# Patient Record
Sex: FEMALE | Race: WHITE | NOT HISPANIC OR LATINO | Employment: OTHER | URBAN - METROPOLITAN AREA
[De-identification: names, ages, dates, MRNs, and addresses within clinical notes are randomized per-mention and may not be internally consistent; named-entity substitution may affect disease eponyms.]

---

## 2017-01-12 ENCOUNTER — HOSPITAL ENCOUNTER (OUTPATIENT)
Dept: PAIN MEDICINE | Facility: HOSPITAL | Age: 62
Discharge: HOME | End: 2017-01-12
Attending: ANESTHESIOLOGY | Admitting: ANESTHESIOLOGY

## 2017-02-02 ENCOUNTER — HOSPITAL ENCOUNTER (OUTPATIENT)
Dept: ORTHOPEDIC SURGERY | Facility: CLINIC | Age: 62
Discharge: HOME | End: 2017-02-02
Attending: ORTHOPAEDIC SURGERY | Admitting: ORTHOPAEDIC SURGERY

## 2017-02-08 ENCOUNTER — HOSPITAL ENCOUNTER (OUTPATIENT)
Dept: MRI IMAGING | Facility: HOSPITAL | Age: 62
Discharge: HOME | End: 2017-02-08
Attending: ORTHOPAEDIC SURGERY | Admitting: ORTHOPAEDIC SURGERY

## 2017-02-21 ENCOUNTER — HOSPITAL ENCOUNTER (OUTPATIENT)
Dept: OTHER | Facility: HOSPITAL | Age: 62
Discharge: HOME | End: 2017-02-21
Attending: ORTHOPAEDIC SURGERY | Admitting: ORTHOPAEDIC SURGERY

## 2017-02-21 ENCOUNTER — OFFICE (AMBULATORY)
Dept: URBAN - METROPOLITAN AREA CLINIC 64 | Facility: CLINIC | Age: 62
End: 2017-02-21

## 2017-02-21 VITALS
HEIGHT: 63 IN | HEART RATE: 102 BPM | WEIGHT: 187 LBS | DIASTOLIC BLOOD PRESSURE: 63 MMHG | SYSTOLIC BLOOD PRESSURE: 91 MMHG

## 2017-02-21 DIAGNOSIS — K59.00 CONSTIPATION, UNSPECIFIED: ICD-10-CM

## 2017-02-21 DIAGNOSIS — K21.9 GASTRO-ESOPHAGEAL REFLUX DISEASE WITHOUT ESOPHAGITIS: ICD-10-CM

## 2017-02-21 LAB
ANION GAP SERPL CALC-SCNC: 9.8 MMOL/L (ref 10–20)
BACTERIA SPEC AEROBE CULT: NORMAL
BASOPHILS # BLD AUTO: 0.1 10*3/UL (ref 0–0.2)
BASOPHILS NFR BLD AUTO: 1 % (ref 0–2)
BUN SERPL-MCNC: 15 MG/DL (ref 8–20)
BUN/CREAT SERPL: 18.8 (ref 5.4–26.2)
CALCIUM SERPL-MCNC: 9.5 MG/DL (ref 8.9–10.3)
CHLORIDE SERPL-SCNC: 104 MMOL/L (ref 101–111)
CONV CO2: 31 MMOL/L (ref 22–32)
CREAT UR-MCNC: 0.8 MG/DL (ref 0.4–1)
DIFFERENTIAL METHOD BLD: (no result)
EOSINOPHIL # BLD AUTO: 0.1 10*3/UL (ref 0–0.3)
EOSINOPHIL # BLD AUTO: 2 % (ref 0–3)
ERYTHROCYTE [DISTWIDTH] IN BLOOD BY AUTOMATED COUNT: 13.8 % (ref 11.5–14.5)
GLUCOSE SERPL-MCNC: 79 MG/DL (ref 65–99)
HCT VFR BLD AUTO: 36.9 % (ref 35–49)
HGB BLD-MCNC: 12.6 G/DL (ref 12–15)
LYMPHOCYTES # BLD AUTO: 2 10*3/UL (ref 0.8–4.8)
LYMPHOCYTES NFR BLD AUTO: 37 % (ref 18–42)
Lab: NORMAL
MCH RBC QN AUTO: 31 PG (ref 26–32)
MCHC RBC AUTO-ENTMCNC: 34.2 G/DL (ref 32–36)
MCV RBC AUTO: 90.5 FL (ref 80–94)
MICRO REPORT STATUS: NORMAL
MONOCYTES # BLD AUTO: 0.4 10*3/UL (ref 0.1–1.3)
MONOCYTES NFR BLD AUTO: 7 % (ref 2–11)
NEUTROPHILS # BLD AUTO: 2.9 10*3/UL (ref 2.3–8.6)
NEUTROPHILS NFR BLD AUTO: 53 % (ref 50–75)
NRBC BLD AUTO-RTO: 0 /100{WBCS}
NRBC/RBC NFR BLD MANUAL: 0 10*3/UL
PLATELET # BLD AUTO: 287 10*3/UL (ref 150–450)
PMV BLD AUTO: 8 FL (ref 7.4–10.4)
POTASSIUM SERPL-SCNC: 3.8 MMOL/L (ref 3.6–5.1)
RBC # BLD AUTO: 4.07 10*6/UL (ref 4–5.4)
SODIUM SERPL-SCNC: 141 MMOL/L (ref 136–144)
SPECIMEN SOURCE: NORMAL
WBC # BLD AUTO: 5.5 10*3/UL (ref 4.5–11.5)

## 2017-02-21 PROCEDURE — 99213 OFFICE O/P EST LOW 20 MIN: CPT | Performed by: INTERNAL MEDICINE

## 2017-02-21 RX ORDER — POLYETHYLENE GLYCOL 3350 17 G/17G
17 POWDER, FOR SOLUTION ORAL
Qty: 1 | Refills: 11 | Status: COMPLETED
Start: 2016-09-06 | End: 2017-02-21

## 2017-02-21 RX ORDER — LUBIPROSTONE 8 UG/1
16 CAPSULE, GELATIN COATED ORAL
Qty: 60 | Refills: 6 | Status: COMPLETED
Start: 2017-02-21 | End: 2017-03-08

## 2017-02-21 RX ORDER — ESOMEPRAZOLE MAGNESIUM 40 MG/1
CAPSULE, DELAYED RELEASE ORAL
Qty: 180 | Refills: 4 | Status: ACTIVE

## 2017-02-27 ENCOUNTER — HOSPITAL ENCOUNTER (OUTPATIENT)
Dept: PREOP | Facility: HOSPITAL | Age: 62
Setting detail: HOSPITAL OUTPATIENT SURGERY
Discharge: HOME | End: 2017-02-27
Attending: ORTHOPAEDIC SURGERY | Admitting: ORTHOPAEDIC SURGERY

## 2017-02-27 LAB — GLUCOSE BLD-MCNC: NORMAL MG/DL (ref 70–105)

## 2017-05-15 ENCOUNTER — HOSPITAL ENCOUNTER (OUTPATIENT)
Dept: PAIN MEDICINE | Facility: HOSPITAL | Age: 62
Discharge: HOME | End: 2017-05-15
Attending: ANESTHESIOLOGY | Admitting: ANESTHESIOLOGY

## 2017-06-19 ENCOUNTER — HOSPITAL ENCOUNTER (OUTPATIENT)
Dept: PAIN MEDICINE | Facility: HOSPITAL | Age: 62
Discharge: HOME | End: 2017-06-19
Attending: ANESTHESIOLOGY | Admitting: ANESTHESIOLOGY

## 2017-07-03 ENCOUNTER — HOSPITAL ENCOUNTER (OUTPATIENT)
Dept: PAIN MEDICINE | Facility: HOSPITAL | Age: 62
Discharge: HOME | End: 2017-07-03
Attending: ANESTHESIOLOGY | Admitting: ANESTHESIOLOGY

## 2017-07-24 ENCOUNTER — HOSPITAL ENCOUNTER (OUTPATIENT)
Dept: PAIN MEDICINE | Facility: HOSPITAL | Age: 62
Discharge: HOME | End: 2017-07-24
Attending: ANESTHESIOLOGY | Admitting: ANESTHESIOLOGY

## 2017-10-09 ENCOUNTER — HOSPITAL ENCOUNTER (OUTPATIENT)
Dept: PAIN MEDICINE | Facility: HOSPITAL | Age: 62
Discharge: HOME | End: 2017-10-09
Attending: ANESTHESIOLOGY | Admitting: ANESTHESIOLOGY

## 2017-11-20 ENCOUNTER — HOSPITAL ENCOUNTER (OUTPATIENT)
Dept: PAIN MEDICINE | Facility: HOSPITAL | Age: 62
Discharge: HOME | End: 2017-11-20
Attending: ANESTHESIOLOGY | Admitting: ANESTHESIOLOGY

## 2017-12-12 ENCOUNTER — HOSPITAL ENCOUNTER (OUTPATIENT)
Dept: ORTHOPEDIC SURGERY | Facility: CLINIC | Age: 62
Discharge: HOME | End: 2017-12-12
Attending: ORTHOPAEDIC SURGERY | Admitting: ORTHOPAEDIC SURGERY

## 2018-01-22 ENCOUNTER — HOSPITAL ENCOUNTER (OUTPATIENT)
Dept: PREADMISSION TESTING | Facility: HOSPITAL | Age: 63
Discharge: HOME | End: 2018-01-22
Attending: ORTHOPAEDIC SURGERY | Admitting: ORTHOPAEDIC SURGERY

## 2018-01-22 LAB
ANION GAP SERPL CALC-SCNC: 12.8 MMOL/L (ref 10–20)
BACTERIA SPEC AEROBE CULT: NORMAL
BASOPHILS # BLD AUTO: 0.1 10*3/UL (ref 0–0.2)
BASOPHILS NFR BLD AUTO: 3 % (ref 0–2)
BUN SERPL-MCNC: 13 MG/DL (ref 8–20)
BUN/CREAT SERPL: 16.3 (ref 5.4–26.2)
CALCIUM SERPL-MCNC: 9.4 MG/DL (ref 8.9–10.3)
CHLORIDE SERPL-SCNC: 102 MMOL/L (ref 101–111)
CONV CO2: 29 MMOL/L (ref 22–32)
CREAT UR-MCNC: 0.8 MG/DL (ref 0.4–1)
DIFFERENTIAL METHOD BLD: (no result)
EOSINOPHIL # BLD AUTO: 0.2 10*3/UL (ref 0–0.3)
EOSINOPHIL # BLD AUTO: 4 % (ref 0–3)
ERYTHROCYTE [DISTWIDTH] IN BLOOD BY AUTOMATED COUNT: 13.7 % (ref 11.5–14.5)
GLUCOSE SERPL-MCNC: 89 MG/DL (ref 65–99)
HCT VFR BLD AUTO: 38.3 % (ref 35–49)
HGB BLD-MCNC: 12.9 G/DL (ref 12–15)
LYMPHOCYTES # BLD AUTO: 1.5 10*3/UL (ref 0.8–4.8)
LYMPHOCYTES NFR BLD AUTO: 32 % (ref 18–42)
Lab: NORMAL
MCH RBC QN AUTO: 30.1 PG (ref 26–32)
MCHC RBC AUTO-ENTMCNC: 33.7 G/DL (ref 32–36)
MCV RBC AUTO: 89.3 FL (ref 80–94)
MICRO REPORT STATUS: NORMAL
MONOCYTES # BLD AUTO: 0.5 10*3/UL (ref 0.1–1.3)
MONOCYTES NFR BLD AUTO: 10 % (ref 2–11)
NEUTROPHILS # BLD AUTO: 2.6 10*3/UL (ref 2.3–8.6)
NEUTROPHILS NFR BLD AUTO: 51 % (ref 50–75)
NRBC BLD AUTO-RTO: 0 /100{WBCS}
NRBC/RBC NFR BLD MANUAL: 0 10*3/UL
PLATELET # BLD AUTO: 328 10*3/UL (ref 150–450)
PMV BLD AUTO: 8.2 FL (ref 7.4–10.4)
POTASSIUM SERPL-SCNC: 3.8 MMOL/L (ref 3.6–5.1)
RBC # BLD AUTO: 4.29 10*6/UL (ref 4–5.4)
SODIUM SERPL-SCNC: 140 MMOL/L (ref 136–144)
SPECIMEN SOURCE: NORMAL
WBC # BLD AUTO: 4.9 10*3/UL (ref 4.5–11.5)

## 2018-01-29 ENCOUNTER — HOSPITAL ENCOUNTER (OUTPATIENT)
Dept: PREOP | Facility: HOSPITAL | Age: 63
Setting detail: HOSPITAL OUTPATIENT SURGERY
Discharge: HOME | End: 2018-01-29
Attending: ORTHOPAEDIC SURGERY | Admitting: ORTHOPAEDIC SURGERY

## 2018-01-29 LAB
GLUCOSE BLD-MCNC: 104 MG/DL (ref 70–105)
GLUCOSE BLD-MCNC: 128 MG/DL (ref 70–105)

## 2018-02-20 ENCOUNTER — HOSPITAL ENCOUNTER (OUTPATIENT)
Dept: PAIN MEDICINE | Facility: HOSPITAL | Age: 63
Discharge: HOME | End: 2018-02-20
Attending: ANESTHESIOLOGY | Admitting: ANESTHESIOLOGY

## 2018-04-13 ENCOUNTER — OFFICE (AMBULATORY)
Dept: URBAN - METROPOLITAN AREA CLINIC 64 | Facility: CLINIC | Age: 63
End: 2018-04-13

## 2018-04-13 VITALS
HEART RATE: 108 BPM | HEIGHT: 63 IN | WEIGHT: 184 LBS | DIASTOLIC BLOOD PRESSURE: 65 MMHG | SYSTOLIC BLOOD PRESSURE: 111 MMHG

## 2018-04-13 DIAGNOSIS — K21.9 GASTRO-ESOPHAGEAL REFLUX DISEASE WITHOUT ESOPHAGITIS: ICD-10-CM

## 2018-04-13 DIAGNOSIS — K59.00 CONSTIPATION, UNSPECIFIED: ICD-10-CM

## 2018-04-13 DIAGNOSIS — Z86.010 PERSONAL HISTORY OF COLONIC POLYPS: ICD-10-CM

## 2018-04-13 PROCEDURE — 99213 OFFICE O/P EST LOW 20 MIN: CPT | Performed by: INTERNAL MEDICINE

## 2018-04-13 RX ORDER — RANITIDINE 300 MG/1
TABLET ORAL
Qty: 90 | Refills: 4 | Status: COMPLETED
End: 2021-07-20

## 2018-04-13 RX ORDER — ESOMEPRAZOLE MAGNESIUM 40 MG/1
CAPSULE, DELAYED RELEASE ORAL
Qty: 180 | Refills: 4 | Status: ACTIVE

## 2018-04-13 RX ORDER — LUBIPROSTONE 24 UG/1
CAPSULE, GELATIN COATED ORAL
Qty: 180 | Refills: 3 | Status: ACTIVE

## 2018-05-01 ENCOUNTER — HOSPITAL ENCOUNTER (OUTPATIENT)
Dept: PAIN MEDICINE | Facility: HOSPITAL | Age: 63
Discharge: HOME | End: 2018-05-01
Attending: ANESTHESIOLOGY | Admitting: ANESTHESIOLOGY

## 2018-05-15 ENCOUNTER — HOSPITAL ENCOUNTER (OUTPATIENT)
Dept: PAIN MEDICINE | Facility: HOSPITAL | Age: 63
Discharge: HOME | End: 2018-05-15
Attending: ANESTHESIOLOGY | Admitting: ANESTHESIOLOGY

## 2018-05-31 ENCOUNTER — ON CAMPUS - OUTPATIENT (AMBULATORY)
Dept: URBAN - METROPOLITAN AREA HOSPITAL 2 | Facility: HOSPITAL | Age: 63
End: 2018-05-31

## 2018-05-31 ENCOUNTER — OFFICE (AMBULATORY)
Dept: URBAN - METROPOLITAN AREA PATHOLOGY 4 | Facility: PATHOLOGY | Age: 63
End: 2018-05-31

## 2018-05-31 VITALS
HEART RATE: 84 BPM | RESPIRATION RATE: 20 BRPM | SYSTOLIC BLOOD PRESSURE: 134 MMHG | HEIGHT: 63 IN | HEART RATE: 95 BPM | DIASTOLIC BLOOD PRESSURE: 88 MMHG | DIASTOLIC BLOOD PRESSURE: 83 MMHG | RESPIRATION RATE: 15 BRPM | SYSTOLIC BLOOD PRESSURE: 121 MMHG | OXYGEN SATURATION: 98 % | DIASTOLIC BLOOD PRESSURE: 87 MMHG | DIASTOLIC BLOOD PRESSURE: 94 MMHG | HEART RATE: 88 BPM | SYSTOLIC BLOOD PRESSURE: 126 MMHG | OXYGEN SATURATION: 96 % | OXYGEN SATURATION: 99 % | HEART RATE: 85 BPM | SYSTOLIC BLOOD PRESSURE: 119 MMHG | HEART RATE: 97 BPM | OXYGEN SATURATION: 97 % | OXYGEN SATURATION: 94 % | OXYGEN SATURATION: 100 % | SYSTOLIC BLOOD PRESSURE: 133 MMHG | TEMPERATURE: 98.7 F | HEART RATE: 86 BPM | DIASTOLIC BLOOD PRESSURE: 71 MMHG | SYSTOLIC BLOOD PRESSURE: 145 MMHG | HEART RATE: 90 BPM | WEIGHT: 184 LBS | SYSTOLIC BLOOD PRESSURE: 124 MMHG | RESPIRATION RATE: 18 BRPM | HEART RATE: 87 BPM | RESPIRATION RATE: 17 BRPM | DIASTOLIC BLOOD PRESSURE: 85 MMHG | SYSTOLIC BLOOD PRESSURE: 150 MMHG | SYSTOLIC BLOOD PRESSURE: 143 MMHG | RESPIRATION RATE: 16 BRPM | DIASTOLIC BLOOD PRESSURE: 77 MMHG

## 2018-05-31 DIAGNOSIS — K64.1 SECOND DEGREE HEMORRHOIDS: ICD-10-CM

## 2018-05-31 DIAGNOSIS — K57.30 DIVERTICULOSIS OF LARGE INTESTINE WITHOUT PERFORATION OR ABS: ICD-10-CM

## 2018-05-31 DIAGNOSIS — D12.3 BENIGN NEOPLASM OF TRANSVERSE COLON: ICD-10-CM

## 2018-05-31 DIAGNOSIS — K59.00 CONSTIPATION, UNSPECIFIED: ICD-10-CM

## 2018-05-31 DIAGNOSIS — Z86.010 PERSONAL HISTORY OF COLONIC POLYPS: ICD-10-CM

## 2018-05-31 LAB
GI HISTOLOGY: A. UNSPECIFIED: (no result)
GI HISTOLOGY: PDF REPORT: (no result)

## 2018-05-31 PROCEDURE — 88305 TISSUE EXAM BY PATHOLOGIST: CPT | Mod: 26 | Performed by: INTERNAL MEDICINE

## 2018-05-31 PROCEDURE — 45380 COLONOSCOPY AND BIOPSY: CPT | Performed by: INTERNAL MEDICINE

## 2018-05-31 RX ADMIN — PROPOFOL: 10 INJECTION, EMULSION INTRAVENOUS at 08:04

## 2018-06-26 ENCOUNTER — HOSPITAL ENCOUNTER (OUTPATIENT)
Dept: PAIN MEDICINE | Facility: HOSPITAL | Age: 63
Discharge: HOME | End: 2018-06-26
Attending: ANESTHESIOLOGY | Admitting: ANESTHESIOLOGY

## 2018-07-30 ENCOUNTER — HOSPITAL ENCOUNTER (OUTPATIENT)
Dept: PAIN MEDICINE | Facility: HOSPITAL | Age: 63
Discharge: HOME | End: 2018-07-30
Attending: ANESTHESIOLOGY | Admitting: ANESTHESIOLOGY

## 2018-08-13 ENCOUNTER — HOSPITAL ENCOUNTER (OUTPATIENT)
Dept: PAIN MEDICINE | Facility: HOSPITAL | Age: 63
Discharge: HOME | End: 2018-08-13
Attending: ANESTHESIOLOGY | Admitting: ANESTHESIOLOGY

## 2018-08-20 ENCOUNTER — HOSPITAL ENCOUNTER (OUTPATIENT)
Dept: PAIN MEDICINE | Facility: HOSPITAL | Age: 63
Discharge: HOME | End: 2018-08-20
Attending: ANESTHESIOLOGY | Admitting: ANESTHESIOLOGY

## 2018-10-08 ENCOUNTER — HOSPITAL ENCOUNTER (OUTPATIENT)
Dept: PAIN MEDICINE | Facility: HOSPITAL | Age: 63
Discharge: HOME | End: 2018-10-08
Attending: ANESTHESIOLOGY | Admitting: ANESTHESIOLOGY

## 2018-11-19 ENCOUNTER — HOSPITAL ENCOUNTER (OUTPATIENT)
Dept: PAIN MEDICINE | Facility: HOSPITAL | Age: 63
Discharge: HOME | End: 2018-11-19
Attending: ANESTHESIOLOGY | Admitting: ANESTHESIOLOGY

## 2019-05-31 ENCOUNTER — HOSPITAL ENCOUNTER (OUTPATIENT)
Dept: PAIN MEDICINE | Facility: HOSPITAL | Age: 64
Discharge: HOME | End: 2019-05-31
Attending: ANESTHESIOLOGY | Admitting: ANESTHESIOLOGY

## 2019-05-31 ENCOUNTER — CONVERSION ENCOUNTER (OUTPATIENT)
Dept: PAIN MEDICINE | Facility: CLINIC | Age: 64
End: 2019-05-31

## 2019-06-04 VITALS
WEIGHT: 186 LBS | HEART RATE: 103 BPM | BODY MASS INDEX: 32.96 KG/M2 | HEIGHT: 63 IN | SYSTOLIC BLOOD PRESSURE: 105 MMHG | OXYGEN SATURATION: 97 % | RESPIRATION RATE: 16 BRPM | DIASTOLIC BLOOD PRESSURE: 76 MMHG

## 2019-06-06 NOTE — PROGRESS NOTES
HPI: Back pain ,  Lumbar Back Pain with BLLE Pain,  Lumbar Back Pain is increased with ambulation.  Patient declines pain meds and agrees to Lumbar Epidural #1 on next visit.  Patient is on Gralise and also uses Topical Rx. Taking Norco 5mg TID prn and   Lyrica with Dr. Scott.    Review of Systems   Neurologic: Complains of Headache, Gait Disturbance. Denies Headache,Seizure,Parasthesia,Stroke,Speech Abnormalities,Gait Disturbance,Frequent Falls,Vertigo,Visual Problems,Double Vision,Nausea,Tremors.   General: Denies fevers, chills, sweats,anorexia,fatigue,malaise,tremors,weight loss.   Musculoskeletal: Complains of back pain, stiffness. Denies back pain, joint pain, joint swelling, muscle cramps, muscle weakness, stiffness, arthritis.     Referring MD: Spring Silverio MD  Age: 63 Years Old  Sex: Female  Race: White    Pain Assessment   Location of Pain: Right knee  Previous Pain Rating : 5  Current Pain Ratin  Last Dose Pain medicine [place] a few months ago   Have your bowel habits changed since you started taking pain medication? No  Epidural History Epidural history patient reports they help       Past Medical History:     Reviewed history from 2018 and no changes required:        Hypertension        Cholesterol        Hypothyroid        Migraine        IBS        GERD        Hiatal Hernia        Spinal Stenosis        Spondylosis        Dysphagia        Aure Block C5-C7, T9-T0 AURE  Dr Scott @ [facility]         No Drug Allergies?                                                   sleep apnea with use of C-PAP at night         Bilateral knee injections - 10/2015        diabetes        TMJ    Past Surgical History:     Reviewed history from 2018 and no changes required:        Bladder Sling - 2005        Tubal - 2005        Hysterectomy - 2005        Left knee scope 17 Dr. Boone        Right knee scope 18 Dr. Boone    Family History Summary:      Reviewed history Last on 2018 and  no changes required:2019  Other Family Member - Has FH Other Cancer - Entered On: 2017  Other Family Member - Has FH High Cholesterol - Entered On: 2017  Other Family Member - Has FH Hypertension - Entered On: 2017  Other Family Member - Has FH Heart Disease - Entered On: 2017  Other Family Member - Has FH Diabetes - Entered On: 2017    General Comments - FH:  sister has back problems      Social History:     Reviewed history from 2017 and no changes required:        not employeed 3/2011        nonsmoker        disabled         Vital Signs:    Patient Profile:    63 Years Old Female  Height:     63 inches (157.48 cm)  Weight:     186 pounds  BMI:        32.94     O2 Sat:     97 %  Temp:       98.5 degrees F oral  Pulse rate: 103 / minute  Resp:       16 per minute  BP Sittin / 76  (left arm)    Cuff size:  regular      Problems: Active problems were reviewed with the patient during this visit.  Medications: Medications were reviewed with the patient during this visit.  Allergies: Allergies were reviewed with the patient during this visit.  No Known Allergy.        Vitals Entered By: Kenia Cooley MA (May 31, 2019 11:37 AM)      Risk Factors:     Smoked Tobacco Use:  Former smoker     Cigarettes:  Yes        Year quit:          Years Since Last Quit:    Smokeless Tobacco Use:  Never  Passive smoke exposure:  no  Drug use:  no  HIV high-risk behavior:  no  Caffeine use:  1 drinks per day  Alcohol use:  no  Exercise:  no  Seatbelt use:  100 %  Sun Exposure:  rarely    Previous Tobacco Use: Signed On - 2018  Smoked Tobacco Use:  Former smoker     Cigarettes:  Yes        Year quit:          Years Since Last Quit:  29 years, 4 months, 30 days  Smokeless Tobacco Use:  Never  Passive smoke exposure:  no  Drug use:  no  HIV high-risk behavior:  no  Caffeine use:  1 drinks per day    Previous Alcohol Use: Signed On - 2018  Alcohol use:  no  Exercise:   no  Seatbelt use:  100 %  Sun Exposure:  rarely        Review of Systems        See HPI      Physical Exam   General  General appearance: well developed, well nourished, no acute distress  Communication ability: communicates by voice, normal quality  Gait and station: antalgic    Mental Status Exam   Mental Status: AAO x3  Thought Content: Intact  Behavior: Appropriate    Head and Face   Inspection: normocephalic, no scars, lesions, or masses  Facial strength: no weakness    Respiratory   Auscultation: clear to auscultation bilaterally, no rales, rhonchi, or wheezes    Cardiovascular   Auscultation: RRR, no murmur, rub, or gallop    Abdomen   Abdomen: soft, non-tender, non-distended, no masses, bowel sounds normal    Extremities   Pedal pulses: pulses 1+, symmetric  Periph. circulation: no cyanosis, clubbing, edema, or varicosities      EXAM:     Neuro   R Leg 1+  L Leg 1+    Strength: Legs Normal  Sensation: SILT BLE      Total Score Risk Category   Low Risk 0 - 3   Moderate Risk 4 - 7   High Risk > 8     Assessment   Status of Existing Problems:  Assessed Restless leg syndrome as unchanged - Jeovanny Khan MD  Assessed Degenerative joint disease of right knee as unchanged - Jeovanny Khan MD  Assessed CERVICAL RADICULOPATHY as unchanged - Jeovanny Khan MD  Assessed CERVICALGIA as unchanged - Jeovanny Khan MD  Assessed THORACIC BACK PAIN as unchanged - Jeovanny Khan MD  Assessed Back pain, lumbar, with radiculopathy as unchanged - Jeovanny Khan MD  Assessed Knee Pain, right as unchanged - Jeovanny Khan MD    Plan   New Prescriptions/Refills:  LYRICA 150 MG ORAL CAPSULE (PREGABALIN) 1 po BID  #60 x 1 [BMN],  05/31/2019, Jeovanny Khan MD  HYDROCODONE-ACETAMINOPHEN 5-325 MG ORAL TABLET (HYDROCODONE-ACETAMINOPHEN) Take 1 tab PO TID prn pain. DX:M54.5. DNF until 6/30/19.  #90 x 0,  05/31/2019, Jeovanny Khan MD  HYDROCODONE-ACETAMINOPHEN 5-325 MG ORAL TABLET  (HYDROCODONE-ACETAMINOPHEN) Take 1 tab PO TID prn pain. DX:M54.5.  #90 x 0,  05/31/2019, Jeovanny Khan MD    Updated Medication List:   HYDROCODONE-ACETAMINOPHEN 5-325 MG ORAL TABLET (HYDROCODONE-ACETAMINOPHEN) Take 1 tab PO TID prn pain. DX:M54.5. DNF until 6/30/19.  CELEBREX CAPSULE (CELECOXIB CAPS) Take 1 tablet by mouth daily  LYRICA 150 MG ORAL CAPSULE (PREGABALIN) 1 po BID [BMN]  LIDOCAINE-PRILOCAINE CREAM (LIDOCAINE-PRILOCAINE CREA)   DICYCLOMINE HCL TABLET (DICYCLOMINE HCL TABS)   NASAL SPRAY BOTTLE (MISC. DEVICES)   RANITIDINE HCL TABLET (RANITIDINE HCL TABS)   ROSUVASTATIN CALCIUM TABLET (ROSUVASTATIN CALCIUM TABS)   AMITIZA 24 MCG ORAL CAPSULE (LUBIPROSTONE) bid  BENEFIBER ORAL TABLET (WHEAT DEXTRIN)   NEXIUM 24HR TABLET DELAYED RELEASE (ESOMEPRAZOLE MAGNESIUM TBEC)   MYRBETRIQ 50 MG ORAL TABLET EXTENDED RELEASE 24 HOUR (MIRABEGRON) Take 1 tablet by mouth daily  BENAZEPRIL-HYDROCHLOROTHIAZIDE 20-12.5 MG ORAL TABLET (BENAZEPRIL-HYDROCHLOROTHIAZIDE) daily  SUMATRIPTAN SUCCINATE 100 MG ORAL TABLET (SUMATRIPTAN SUCCINATE) take 1 at onset of migraine; may redose in 2hrs if needed x 1; max 2 in 24 hrs  CYMBALTA 60 MG ORAL CAPSULE DELAYED RELEASE PARTICLES (DULOXETINE HCL) bid  HYDROXYZINE HCL 25 MG ORAL TABLET (HYDROXYZINE HCL) Take one (1) tablet by mouth three times a day  SIMVASTATIN 40 MG ORAL TABLET (SIMVASTATIN) Take 1 tablet by mouth daily  [facility] HEALTH ORAL CAPSULE (PROBIOTIC PRODUCT) daily  OMEPRAZOLE 40 MG ORAL CAPSULE DELAYED RELEASE (OMEPRAZOLE) Take 1 tablet by mouth daily  BACLOFEN 10 MG ORAL TABLET (BACLOFEN) Take one (1) tablet by mouth BID  LEVOTHYROXINE SODIUM 75 MCG ORAL TABLET (LEVOTHYROXINE SODIUM) Take 1 tablet by mouth daily    New Orders:   Ofc Vst, Est Level III [[id number]]        Patient Instructions:  1)  Inspect reviewed, in order. UDS in order, repeat today.  2)  Cont Norco 5mg TID prn.  3)  Cont Lyrica 150mg BID.  4)  Reschedule LESIs with Dr. Scott.  5)  RTC 1-2 months  to f/u with Dr. Scott.    ]      Electronically signed by Jeovanny Khan MD on 05/31/2019 at 12:35 PM  ________________________________________________________________________       Disclaimer: Converted Note message may not contain all data elements that existed in the legacy source system. Please see [employer] for the original note details.

## 2019-07-10 RX ORDER — LEVOTHYROXINE SODIUM 0.07 MG/1
75 TABLET ORAL DAILY
Qty: 90 TABLET | Refills: 1 | Status: SHIPPED | OUTPATIENT
Start: 2019-07-10 | End: 2019-07-15

## 2019-07-10 RX ORDER — LEVOTHYROXINE SODIUM 0.07 MG/1
1 TABLET ORAL DAILY
Refills: 0 | COMMUNITY
Start: 2019-04-11 | End: 2019-07-10 | Stop reason: SDUPTHER

## 2019-07-10 NOTE — TELEPHONE ENCOUNTER
Pt needing thyroid medication sent to [facility] [place] please and patient requesting refills. Pt has upcoming appt July 15. thanks

## 2019-07-12 PROBLEM — G54.9 NERVE ROOT DISORDER: Status: ACTIVE | Noted: 2019-07-12

## 2019-07-12 PROBLEM — IMO0002 THORACIC OR LUMBOSACRAL NEURITIS OR RADICULITIS: Status: ACTIVE | Noted: 2019-07-12

## 2019-07-12 PROBLEM — K21.9 ACID REFLUX DISEASE: Status: ACTIVE | Noted: 2019-07-12

## 2019-07-12 PROBLEM — Z83.3 FAMILY HISTORY OF DIABETES MELLITUS: Status: ACTIVE | Noted: 2019-07-12

## 2019-07-12 PROBLEM — K58.9: Status: ACTIVE | Noted: 2019-07-12

## 2019-07-12 PROBLEM — M17.9 OSTEOARTHRITIS OF KNEE: Status: ACTIVE | Noted: 2017-02-14

## 2019-07-12 PROBLEM — J30.9 ALLERGIC RHINITIS: Status: ACTIVE | Noted: 2019-07-12

## 2019-07-12 PROBLEM — Z87.898 HISTORY OF ULCER DISEASE: Status: ACTIVE | Noted: 2019-07-12

## 2019-07-12 PROBLEM — G43.909 MIGRAINE: Status: ACTIVE | Noted: 2019-07-12

## 2019-07-12 PROBLEM — K63.5 COLON POLYP: Status: ACTIVE | Noted: 2019-07-12

## 2019-07-12 PROBLEM — M25.50 CHRONIC JOINT PAIN: Status: ACTIVE | Noted: 2019-07-12

## 2019-07-12 PROBLEM — G95.0 SYRINX (HCC): Status: ACTIVE | Noted: 2019-07-12

## 2019-07-12 PROBLEM — I10 HYPERTENSION: Status: ACTIVE | Noted: 2019-07-12

## 2019-07-12 PROBLEM — G25.81 RESTLESS LEG SYNDROME: Status: ACTIVE | Noted: 2019-07-12

## 2019-07-12 PROBLEM — G89.29 CHRONIC JOINT PAIN: Status: ACTIVE | Noted: 2019-07-12

## 2019-07-12 PROBLEM — Z79.899 OTHER LONG TERM (CURRENT) DRUG THERAPY: Status: ACTIVE | Noted: 2018-02-20

## 2019-07-12 PROBLEM — K44.9 HERNIA, DIAPHRAGMATIC: Status: ACTIVE | Noted: 2019-07-12

## 2019-07-12 PROBLEM — Z51.89 ENCOUNTER FOR OTHER SPECIFIED AFTERCARE: Status: ACTIVE | Noted: 2017-02-14

## 2019-07-12 RX ORDER — RIZATRIPTAN BENZOATE 10 MG/1
1 TABLET ORAL DAILY PRN
Refills: 1 | COMMUNITY
Start: 2019-05-01 | End: 2019-10-29 | Stop reason: SDUPTHER

## 2019-07-12 RX ORDER — HYDROXYZINE HYDROCHLORIDE 25 MG/1
1 TABLET, FILM COATED ORAL 3 TIMES DAILY PRN
COMMUNITY
Start: 2015-01-22 | End: 2020-06-25 | Stop reason: SDUPTHER

## 2019-07-12 RX ORDER — SUMATRIPTAN 100 MG/1
1 TABLET, FILM COATED ORAL DAILY PRN
COMMUNITY
Start: 2015-10-02 | End: 2019-10-30 | Stop reason: SDUPTHER

## 2019-07-12 RX ORDER — LUBIPROSTONE 24 UG/1
1 CAPSULE, GELATIN COATED ORAL 2 TIMES DAILY
Refills: 3 | COMMUNITY
Start: 2019-04-18 | End: 2021-01-27 | Stop reason: SDUPTHER

## 2019-07-12 RX ORDER — ALBUTEROL SULFATE 90 UG/1
2 AEROSOL, METERED RESPIRATORY (INHALATION) EVERY 6 HOURS PRN
COMMUNITY
End: 2020-03-18

## 2019-07-12 RX ORDER — ROSUVASTATIN CALCIUM 5 MG/1
1 TABLET, COATED ORAL NIGHTLY
COMMUNITY
End: 2019-11-04 | Stop reason: SDUPTHER

## 2019-07-12 RX ORDER — MOMETASONE FUROATE 50 UG/1
2 SPRAY, METERED NASAL DAILY
COMMUNITY
End: 2020-04-13 | Stop reason: SDUPTHER

## 2019-07-12 RX ORDER — OMEPRAZOLE 40 MG/1
1 CAPSULE, DELAYED RELEASE ORAL DAILY
COMMUNITY
Start: 2014-04-15 | End: 2020-01-21

## 2019-07-12 RX ORDER — IBUPROFEN 600 MG/1
1 TABLET ORAL EVERY 6 HOURS PRN
COMMUNITY
End: 2019-10-30 | Stop reason: SDUPTHER

## 2019-07-12 RX ORDER — MIRABEGRON 50 MG/1
1 TABLET, FILM COATED, EXTENDED RELEASE ORAL DAILY
Refills: 11 | COMMUNITY
Start: 2019-05-01 | End: 2021-01-27

## 2019-07-12 RX ORDER — RANITIDINE 300 MG/1
1 TABLET ORAL NIGHTLY
COMMUNITY
Start: 2017-12-12 | End: 2020-03-25

## 2019-07-12 RX ORDER — BACLOFEN 10 MG/1
1 TABLET ORAL 2 TIMES DAILY
COMMUNITY
Start: 2014-04-15 | End: 2021-01-27

## 2019-07-12 RX ORDER — CETIRIZINE HYDROCHLORIDE 10 MG/1
1 TABLET ORAL DAILY
COMMUNITY
End: 2020-03-25

## 2019-07-12 RX ORDER — BENAZEPRIL HYDROCHLORIDE AND HYDROCHLOROTHIAZIDE 5; 6.25 MG/1; MG/1
1 TABLET ORAL DAILY
Refills: 5 | COMMUNITY
Start: 2019-05-01 | End: 2019-08-21 | Stop reason: SDUPTHER

## 2019-07-12 RX ORDER — DICYCLOMINE HYDROCHLORIDE 10 MG/1
1 CAPSULE ORAL 2 TIMES DAILY PRN
COMMUNITY
Start: 2017-12-12 | End: 2020-06-30 | Stop reason: SDUPTHER

## 2019-07-12 RX ORDER — LIDOCAINE AND PRILOCAINE 25; 25 MG/G; MG/G
1 CREAM TOPICAL 4 TIMES DAILY
COMMUNITY
Start: 2017-02-14 | End: 2020-03-25

## 2019-07-12 RX ORDER — ESOMEPRAZOLE MAGNESIUM 40 MG/1
1 CAPSULE, DELAYED RELEASE ORAL DAILY
COMMUNITY
Start: 2017-02-14

## 2019-07-12 RX ORDER — HYDROCODONE BITARTRATE AND ACETAMINOPHEN 5; 325 MG/1; MG/1
1 TABLET ORAL 3 TIMES DAILY PRN
COMMUNITY
Start: 2017-12-12 | End: 2021-01-27 | Stop reason: SINTOL

## 2019-07-12 RX ORDER — CELECOXIB 200 MG/1
1 CAPSULE ORAL DAILY
Refills: 5 | COMMUNITY
Start: 2019-04-19 | End: 2019-10-29 | Stop reason: SDUPTHER

## 2019-07-12 RX ORDER — DULOXETIN HYDROCHLORIDE 60 MG/1
1 CAPSULE, DELAYED RELEASE ORAL DAILY
COMMUNITY
Start: 2015-03-10

## 2019-07-15 ENCOUNTER — OFFICE VISIT (OUTPATIENT)
Dept: FAMILY MEDICINE CLINIC | Facility: CLINIC | Age: 64
End: 2019-07-15

## 2019-07-15 VITALS
OXYGEN SATURATION: 97 % | HEART RATE: 93 BPM | SYSTOLIC BLOOD PRESSURE: 117 MMHG | DIASTOLIC BLOOD PRESSURE: 80 MMHG | RESPIRATION RATE: 20 BRPM | WEIGHT: 189 LBS | TEMPERATURE: 97.6 F | HEIGHT: 63 IN | BODY MASS INDEX: 33.49 KG/M2

## 2019-07-15 DIAGNOSIS — Z79.4 TYPE 2 DIABETES MELLITUS WITHOUT COMPLICATION, WITH LONG-TERM CURRENT USE OF INSULIN (HCC): ICD-10-CM

## 2019-07-15 DIAGNOSIS — G95.0: ICD-10-CM

## 2019-07-15 DIAGNOSIS — I10 ESSENTIAL HYPERTENSION: ICD-10-CM

## 2019-07-15 DIAGNOSIS — Z12.39 SCREENING FOR BREAST CANCER: ICD-10-CM

## 2019-07-15 DIAGNOSIS — K21.9 GASTROESOPHAGEAL REFLUX DISEASE WITHOUT ESOPHAGITIS: Primary | ICD-10-CM

## 2019-07-15 DIAGNOSIS — E78.01 FAMILIAL HYPERCHOLESTEROLEMIA: ICD-10-CM

## 2019-07-15 DIAGNOSIS — E03.9 ACQUIRED HYPOTHYROIDISM: ICD-10-CM

## 2019-07-15 DIAGNOSIS — E11.9 TYPE 2 DIABETES MELLITUS WITHOUT COMPLICATION, WITH LONG-TERM CURRENT USE OF INSULIN (HCC): ICD-10-CM

## 2019-07-15 PROCEDURE — 99215 OFFICE O/P EST HI 40 MIN: CPT | Performed by: FAMILY MEDICINE

## 2019-07-15 RX ORDER — TAMSULOSIN HYDROCHLORIDE 0.4 MG/1
1 CAPSULE ORAL DAILY
Refills: 11 | COMMUNITY
Start: 2019-07-10

## 2019-07-15 RX ORDER — LEVOTHYROXINE SODIUM 88 UG/1
88 TABLET ORAL DAILY
Qty: 90 TABLET | Refills: 1 | Status: SHIPPED | OUTPATIENT
Start: 2019-07-15 | End: 2020-01-07 | Stop reason: SDUPTHER

## 2019-07-15 RX ORDER — METFORMIN HYDROCHLORIDE 500 MG/1
500 TABLET, EXTENDED RELEASE ORAL
Qty: 90 TABLET | Refills: 3 | Status: SHIPPED | OUTPATIENT
Start: 2019-07-15 | End: 2020-04-13 | Stop reason: SDUPTHER

## 2019-07-15 RX ORDER — GABAPENTIN 300 MG/1
CAPSULE ORAL
Qty: 180 CAPSULE | Refills: 1 | Status: SHIPPED | OUTPATIENT
Start: 2019-07-15 | End: 2019-10-08

## 2019-07-15 NOTE — PROGRESS NOTES
Subjective   Johanna Swan is a 63 y.o. female.   Chief Complaint   Patient presents with   • Follow-up     lab results        History of Present Illness   Patient presents today to re-establish care with MD. Prior patient at [facility]. Here to follow up on chronic medical conditions including diet-controlled diabetes mellitus, hypertension, hyperlipidemia, and hypothyroidism. She had labs at [facility] in [place] last week.  These have been obtained and reviewed, A1c is 6.2, lipid panel range, TSH 3.35, glucose 111. She does need a refill on her test strips.   She has a history of diabetes, has been taking dietary control after she stopped metformin and lost 30 pounds.  She denies side effects of metformin but I fear it could injure her kidneys.  She is willing to restart the medication at this time.  She reports she is taking Lyrica 50 mg at bedtime only, her pain management doctor has left practice and she is asking for refill medication, she reports however she is concerned about her weight gain and is willing to give gabapentin a try again.  She states release was effective in the past.    The following portions of the patient's history were reviewed and updated as appropriate: allergies, current medications, past family history, past medical history, past social history, past surgical history and problem list.    Review of Systems   Constitutional: Positive for unexpected weight gain. Negative for appetite change, fatigue, fever and unexpected weight loss.   HENT: Positive for ear pain, hearing loss and tinnitus. Negative for congestion and ear discharge.    Eyes: Negative for visual disturbance.   Respiratory: Negative for cough, shortness of breath and wheezing.    Cardiovascular: Negative for chest pain, palpitations and leg swelling.        Patient does report occasional odd/empty feeling in her chest, is not a pressure or a pain   Gastrointestinal: Negative for abdominal pain, constipation, diarrhea,  nausea, vomiting and indigestion.   Musculoskeletal: Positive for arthralgias, back pain and myalgias.   Skin: Negative for rash.   Neurological: Positive for headache. Negative for numbness.   Psychiatric/Behavioral: Negative for sleep disturbance and depressed mood.       Objective    Vitals:    07/15/19 0921   BP: 117/80   Pulse: 93   Resp: 20   Temp: 97.6 °F (36.4 °C)   SpO2: 97%       Physical Exam   Constitutional: She is oriented to person, place, and time. She appears well-developed and well-nourished.   HENT:   Head: Normocephalic and atraumatic.   Right Ear: External ear normal.   Left Ear: External ear normal.   Mouth/Throat: Oropharynx is clear and moist.   Eyes: Conjunctivae and EOM are normal. Pupils are equal, round, and reactive to light.   Neck: No JVD present. No thyromegaly present.   Cardiovascular: Normal rate, regular rhythm and normal heart sounds.   No murmur heard.  Pulmonary/Chest: Breath sounds normal. She has no wheezes. She has no rales.   Musculoskeletal: She exhibits no edema.   Lymphadenopathy:     She has no cervical adenopathy.   Neurological: She is alert and oriented to person, place, and time.   Skin: Skin is warm and dry. No rash noted.   Skin tag anterior abdomen and under left breast.  Mid back there is a 4 mm nodule   Psychiatric: She has a normal mood and affect.   Nursing note and vitals reviewed.        Assessment/Plan   Alberta was seen today for follow-up.    Diagnoses and all orders for this visit:    Gastroesophageal reflux disease without esophagitis    Syringomyelia, acquired (CMS/[facility])  -     gabapentin (NEURONTIN) 300 MG capsule; 1daily at , increase to 2 if needed for nerve pain    Type 2 diabetes mellitus without complication, with long-term current use of insulin (CMS/[facility])  -     metFORMIN ER (GLUCOPHAGE XR) 500 MG 24 hr tablet; Take 1 tablet by mouth Daily With Breakfast.  -     Hemoglobin A1c; Future    Acquired hypothyroidism  -     levothyroxine (SYNTHROID,  LEVOTHROID) 88 MCG tablet; Take 1 tablet by mouth Daily.  -     TSH; Future    Familial hypercholesterolemia    Essential hypertension    Screening for breast cancer  -     Mammo Screening Bilateral With CAD; Future    Other orders  -     glucose blood (ONETOUCH VERIO) test strip; 1 each by Other route Daily. Use as instructed Dx E11.9      Return in about 2 weeks (around 7/29/2019) for Annual physical and seperate visit for skin tag removal.    Spent 50 minutes in room with patient 50% of time in counseling and coronation of care.  Discontinue Lyrica

## 2019-07-15 NOTE — PATIENT INSTRUCTIONS

## 2019-07-22 ENCOUNTER — APPOINTMENT (OUTPATIENT)
Dept: PAIN MEDICINE | Facility: CLINIC | Age: 64
End: 2019-07-22

## 2019-07-29 ENCOUNTER — OFFICE VISIT (OUTPATIENT)
Dept: FAMILY MEDICINE CLINIC | Facility: CLINIC | Age: 64
End: 2019-07-29

## 2019-07-29 VITALS
WEIGHT: 189.4 LBS | SYSTOLIC BLOOD PRESSURE: 121 MMHG | DIASTOLIC BLOOD PRESSURE: 84 MMHG | HEIGHT: 63 IN | HEART RATE: 96 BPM | OXYGEN SATURATION: 94 % | BODY MASS INDEX: 33.56 KG/M2 | TEMPERATURE: 97.6 F | RESPIRATION RATE: 18 BRPM

## 2019-07-29 DIAGNOSIS — Z86.010 HISTORY OF COLON POLYPS: ICD-10-CM

## 2019-07-29 DIAGNOSIS — Z00.00 ANNUAL PHYSICAL EXAM: Primary | ICD-10-CM

## 2019-07-29 PROBLEM — Z86.0100 HISTORY OF COLON POLYPS: Status: ACTIVE | Noted: 2019-07-29

## 2019-07-29 PROCEDURE — G0439 PPPS, SUBSEQ VISIT: HCPCS | Performed by: FAMILY MEDICINE

## 2019-07-29 NOTE — PATIENT INSTRUCTIONS
Preventive Care 40-64 Years, Female  Preventive care refers to lifestyle choices and visits with your health care provider that can promote health and wellness.  What does preventive care include?  · A yearly physical exam. This is also called an annual well check.  · Dental exams once or twice a year.  · Routine eye exams. Ask your health care provider how often you should have your eyes checked.  · Personal lifestyle choices, including:  ? Daily care of your teeth and gums.  ? Regular physical activity.  ? Eating a healthy diet.  ? Avoiding tobacco and drug use.  ? Limiting alcohol use.  ? Practicing safe sex.  ? Taking low-dose aspirin daily starting at age 50.  ? Taking vitamin and mineral supplements as recommended by your health care provider.  What happens during an annual well check?  The services and screenings done by your health care provider during your annual well check will depend on your age, overall health, lifestyle risk factors, and family history of disease.  Counseling  Your health care provider may ask you questions about your:  · Alcohol use.  · Tobacco use.  · Drug use.  · Emotional well-being.  · Home and relationship well-being.  · Sexual activity.  · Eating habits.  · Work and work environment.  · Method of birth control.  · Menstrual cycle.  · Pregnancy history.    Screening  You may have the following tests or measurements:  · Height, weight, and BMI.  · Blood pressure.  · Lipid and cholesterol levels. These may be checked every 5 years, or more frequently if you are over 50 years old.  · Skin check.  · Lung cancer screening. You may have this screening every year starting at age 55 if you have a 30-pack-year history of smoking and currently smoke or have quit within the past 15 years.  · Fecal occult blood test (FOBT) of the stool. You may have this test every year starting at age 50.  · Flexible sigmoidoscopy or colonoscopy. You may have a sigmoidoscopy every 5 years or a colonoscopy  every 10 years starting at age 50.  · Hepatitis C blood test.  · Hepatitis B blood test.  · Sexually transmitted disease (STD) testing.  · Diabetes screening. This is done by checking your blood sugar (glucose) after you have not eaten for a while (fasting). You may have this done every 1-3 years.  · Mammogram. This may be done every 1-2 years. Talk to your health care provider about when you should start having regular mammograms. This may depend on whether you have a family history of breast cancer.  · BRCA-related cancer screening. This may be done if you have a family history of breast, ovarian, tubal, or peritoneal cancers.  · Pelvic exam and Pap test. This may be done every 3 years starting at age 21. Starting at age 30, this may be done every 5 years if you have a Pap test in combination with an HPV test.  · Bone density scan. This is done to screen for osteoporosis. You may have this scan if you are at high risk for osteoporosis.    Discuss your test results, treatment options, and if necessary, the need for more tests with your health care provider.  Vaccines  Your health care provider may recommend certain vaccines, such as:  · Influenza vaccine. This is recommended every year.  · Tetanus, diphtheria, and acellular pertussis (Tdap, Td) vaccine. You may need a Td booster every 10 years.  · Varicella vaccine. You may need this if you have not been vaccinated.  · Zoster vaccine. You may need this after age 60.  · Measles, mumps, and rubella (MMR) vaccine. You may need at least one dose of MMR if you were born in 1957 or later. You may also need a second dose.  · Pneumococcal 13-valent conjugate (PCV13) vaccine. You may need this if you have certain conditions and were not previously vaccinated.  · Pneumococcal polysaccharide (PPSV23) vaccine. You may need one or two doses if you smoke cigarettes or if you have certain conditions.  · Meningococcal vaccine. You may need this if you have certain  conditions.  · Hepatitis A vaccine. You may need this if you have certain conditions or if you travel or work in places where you may be exposed to hepatitis A.  · Hepatitis B vaccine. You may need this if you have certain conditions or if you travel or work in places where you may be exposed to hepatitis B.  · Haemophilus influenzae type b (Hib) vaccine. You may need this if you have certain conditions.    Talk to your health care provider about which screenings and vaccines you need and how often you need them.  This information is not intended to replace advice given to you by your health care provider. Make sure you discuss any questions you have with your health care provider.  Document Released: 01/13/2017 Document Revised: 09/12/2018 Document Reviewed: 10/18/2016  [employer] Interactive Patient Education © 2019 [employer]    Calorie Counting for Weight Loss  Calories are units of energy. Your body needs a certain amount of calories from food to keep you going throughout the day. When you eat more calories than your body needs, your body stores the extra calories as fat. When you eat fewer calories than your body needs, your body burns fat to get the energy it needs.  Calorie counting means keeping track of how many calories you eat and drink each day. Calorie counting can be helpful if you need to lose weight. If you make sure to eat fewer calories than your body needs, you should lose weight. Ask your health care provider what a healthy weight is for you.  For calorie counting to work, you will need to eat the right number of calories in a day in order to lose a healthy amount of weight per week. A dietitian can help you determine how many calories you need in a day and will give you suggestions on how to reach your calorie goal.  · A healthy amount of weight to lose per week is usually 1-2 lb (0.5-0.9 kg). This usually means that your daily calorie intake should be reduced by 500-750 calories.  · Eating  1,200 - 1,500 calories per day can help most women lose weight.  · Eating 1,500 - 1,800 calories per day can help most men lose weight.    What is my plan?  My goal is to have __________ calories per day.  If I have this many calories per day, I should lose around __________ pounds per week.  What do I need to know about calorie counting?  In order to meet your daily calorie goal, you will need to:  · Find out how many calories are in each food you would like to eat. Try to do this before you eat.  · Decide how much of the food you plan to eat.  · Write down what you ate and how many calories it had. Doing this is called keeping a food log.    To successfully lose weight, it is important to balance calorie counting with a healthy lifestyle that includes regular activity. Aim for 150 minutes of moderate exercise (such as walking) or 75 minutes of vigorous exercise (such as running) each week.  Where do I find calorie information?    The number of calories in a food can be found on a Nutrition Facts label. If a food does not have a Nutrition Facts label, try to look up the calories online or ask your dietitian for help.  Remember that calories are listed per serving. If you choose to have more than one serving of a food, you will have to multiply the calories per serving by the amount of servings you plan to eat. For example, the label on a package of bread might say that a serving size is 1 slice and that there are 90 calories in a serving. If you eat 1 slice, you will have eaten 90 calories. If you eat 2 slices, you will have eaten 180 calories.  How do I keep a food log?  Immediately after each meal, record the following information in your food log:  · What you ate. Don't forget to include toppings, sauces, and other extras on the food.  · How much you ate. This can be measured in cups, ounces, or number of items.  · How many calories each food and drink had.  · The total number of calories in the meal.    Keep  "your food log near you, such as in a small notebook in your pocket, or use a mobile gail or website. Some programs will calculate calories for you and show you how many calories you have left for the day to meet your goal.  What are some calorie counting tips?  · Use your calories on foods and drinks that will fill you up and not leave you hungry:  ? Some examples of foods that fill you up are nuts and nut butters, vegetables, lean proteins, and high-fiber foods like whole grains. High-fiber foods are foods with more than 5 g fiber per serving.  ? Drinks such as sodas, specialty coffee drinks, alcohol, and juices have a lot of calories, yet do not fill you up.  · Eat nutritious foods and avoid empty calories. Empty calories are calories you get from foods or beverages that do not have many vitamins or protein, such as candy, sweets, and soda. It is better to have a nutritious high-calorie food (such as an avocado) than a food with few nutrients (such as a bag of chips).  · Know how many calories are in the foods you eat most often. This will help you calculate calorie counts faster.  · Pay attention to calories in drinks. Low-calorie drinks include water and unsweetened drinks.  · Pay attention to nutrition labels for \"low fat\" or \"fat free\" foods. These foods sometimes have the same amount of calories or more calories than the full fat versions. They also often have added sugar, starch, or salt, to make up for flavor that was removed with the fat.  · Find a way of tracking calories that works for you. Get creative. Try different apps or programs if writing down calories does not work for you.  What are some portion control tips?  · Know how many calories are in a serving. This will help you know how many servings of a certain food you can have.  · Use a measuring cup to measure serving sizes. You could also try weighing out portions on a kitchen scale. With time, you will be able to estimate serving sizes for some " foods.  · Take some time to put servings of different foods on your favorite plates, bowls, and cups so you know what a serving looks like.  · Try not to eat straight from a bag or box. Doing this can lead to overeating. Put the amount you would like to eat in a cup or on a plate to make sure you are eating the right portion.  · Use smaller plates, glasses, and bowls to prevent overeating.  · Try not to multitask (for example, watch TV or use your computer) while eating. If it is time to eat, sit down at a table and enjoy your food. This will help you to know when you are full. It will also help you to be aware of what you are eating and how much you are eating.  What are tips for following this plan?  Reading food labels  · Check the calorie count compared to the serving size. The serving size may be smaller than what you are used to eating.  · Check the source of the calories. Make sure the food you are eating is high in vitamins and protein and low in saturated and trans fats.  Shopping  · Read nutrition labels while you shop. This will help you make healthy decisions before you decide to purchase your food.  · Make a grocery list and stick to it.  Cooking  · Try to cook your favorite foods in a healthier way. For example, try baking instead of frying.  · Use low-fat dairy products.  Meal planning  · Use more fruits and vegetables. Half of your plate should be fruits and vegetables.  · Include lean proteins like poultry and fish.  How do I count calories when eating out?  · Ask for smaller portion sizes.  · Consider sharing an entree and sides instead of getting your own entree.  · If you get your own entree, eat only half. Ask for a box at the beginning of your meal and put the rest of your entree in it so you are not tempted to eat it.  · If calories are listed on the menu, choose the lower calorie options.  · Choose dishes that include vegetables, fruits, whole grains, low-fat dairy products, and lean  protein.  · Choose items that are boiled, broiled, grilled, or steamed. Stay away from items that are buttered, battered, fried, or served with cream sauce. Items labeled “crispy” are usually fried, unless stated otherwise.  · Choose water, low-fat milk, unsweetened iced tea, or other drinks without added sugar. If you want an alcoholic beverage, choose a lower calorie option such as a glass of wine or light beer.  · Ask for dressings, sauces, and syrups on the side. These are usually high in calories, so you should limit the amount you eat.  · If you want a salad, choose a garden salad and ask for grilled meats. Avoid extra toppings like frias, cheese, or fried items. Ask for the dressing on the side, or ask for olive oil and vinegar or lemon to use as dressing.  · Estimate how many servings of a food you are given. For example, a serving of cooked rice is ½ cup or about the size of half a baseball. Knowing serving sizes will help you be aware of how much food you are eating at restaurants. The list below tells you how big or small some common portion sizes are based on everyday objects:  ? 1 oz--4 stacked dice.  ? 3 oz--1 deck of cards.  ? 1 tsp--1 die.  ? 1 Tbsp--½ a ping-pong ball.  ? 2 Tbsp--1 ping-pong ball.  ? ½ cup--½ baseball.  ? 1 cup--1 baseball.  Summary  · Calorie counting means keeping track of how many calories you eat and drink each day. If you eat fewer calories than your body needs, you should lose weight.  · A healthy amount of weight to lose per week is usually 1-2 lb (0.5-0.9 kg). This usually means reducing your daily calorie intake by 500-750 calories.  · The number of calories in a food can be found on a Nutrition Facts label. If a food does not have a Nutrition Facts label, try to look up the calories online or ask your dietitian for help.  · Use your calories on foods and drinks that will fill you up, and not on foods and drinks that will leave you hungry.  · Use smaller plates, glasses,  and bowls to prevent overeating.  This information is not intended to replace advice given to you by your health care provider. Make sure you discuss any questions you have with your health care provider.  Document Released: 12/18/2006 Document Revised: 11/17/2017 Document Reviewed: 11/17/2017  [employer] Interactive Patient Education © 2019 [employer]

## 2019-07-29 NOTE — PROGRESS NOTES
The ABCs of the Annual Wellness Visit  Subsequent Medicare Wellness Visit    Chief Complaint   Patient presents with   • Annual Exam       Subjective   History of Present Illness:  Johanna Swan is a 63 y.o. female who presents for a Subsequent Medicare Wellness Visit.  Last mammogram: 19 @ [facility] with a BIRADS 2  Last Colonoscopy- 3/24/2015- Polyp & Tubular Adenoma found, Dr Elena, have upcoming appt scheduled h/o Schatzki rings  Last Eye Exam- 2019 by Dr. Rehman at [facility] in [place].  Pap Smear- Patient has had a total hysterectomy, no history of abnormals.   PHQ 2 = 0  Last Dexa - 18 @ [facility]  T score -0.4    HEALTH RISK ASSESSMENT    Recent Hospitalizations:  No hospitalization(s) within the last year.    Current Medical Providers:  Patient Care Team:  Spring Silverio MD as PCP - General (Family Medicine)  Spring Silverio MD as PCP - Family Medicine    Smoking Status:  Social History     Tobacco Use   Smoking Status Former Smoker   • Packs/day: 1.50   • Years: 20.00   • Pack years: 30.00   • Types: Cigarettes   • Start date:    • Last attempt to quit:    • Years since quittin.5   Smokeless Tobacco Never Used       Alcohol Consumption:  Social History     Substance and Sexual Activity   Alcohol Use No   • Frequency: Never       Depression Screen:   PHQ-2/PHQ-9 Depression Screening 2019   Little interest or pleasure in doing things 0   Feeling down, depressed, or hopeless 0   Total Score 0       Fall Risk Screen:  STEADI Fall Risk Assessment has not been completed.    Health Habits and Functional and Cognitive Screening:  No flowsheet data found.      Does the patient have evidence of cognitive impairment? No    Asprin use counseling:Does not need ASA (and currently is not on it)    Age-appropriate Screening Schedule:  Refer to the list below for future screening recommendations based on patient's age,  sex and/or medical conditions. Orders for these recommended tests are listed in the plan section. The patient has been provided with a written plan.    Health Maintenance   Topic Date Due   • URINE MICROALBUMIN  1955   • PNEUMOCOCCAL VACCINE (19-64 MEDIUM RISK) (1 of 1 - PPSV23) 12/23/1975   • TDAP/TD VACCINES (1 - Tdap) 12/23/1975   • ZOSTER VACCINE (2 of 2) 11/25/2017   • DIABETIC FOOT EXAM  06/10/2019   • PAP SMEAR  06/10/2019   • HEMOGLOBIN A1C  06/10/2019   • DIABETIC EYE EXAM  06/10/2019   • COLONOSCOPY  06/10/2019   • LIPID PANEL  07/10/2019   • INFLUENZA VACCINE  08/01/2019          The following portions of the patient's history were reviewed and updated as appropriate: allergies, current medications, past family history, past medical history, past social history, past surgical history and problem list.    Outpatient Medications Prior to Visit   Medication Sig Dispense Refill   • albuterol sulfate  (90 Base) MCG/ACT inhaler Inhale 2 puffs Every 6 (Six) Hours As Needed.     • AMITIZA 24 MCG capsule Take 1 capsule by mouth 2 (Two) Times a Day.  3   • baclofen (LIORESAL) 10 MG tablet Take 1 tablet by mouth 2 (Two) Times a Day.     • benazepril-hydrochlorthiazide (LOTENSIN HCT) 5-6.25 MG per tablet Take 1 tablet by mouth Daily.  5   • celecoxib (CeleBREX) 200 MG capsule Take 1 capsule by mouth Daily.  5   • cetirizine (zyrTEC) 10 MG tablet Take 1 tablet by mouth Daily.     • dicyclomine (BENTYL) 10 MG capsule Take 1 capsule by mouth 2 (Two) Times a Day As Needed.     • DULoxetine (CYMBALTA) 60 MG capsule Take 1 capsule by mouth Every 12 (Twelve) Hours.     • esomeprazole (NEXIUM) 40 MG capsule Take 1 capsule by mouth Daily.     • gabapentin (NEURONTIN) 300 MG capsule 1daily at hs, increase to 2 if needed for nerve pain 180 capsule 1   • glucose blood (ONETOUCH VERIO) test strip 1 each by Other route Daily. Use as instructed Dx E11.9 100 each 3   • HYDROcodone-acetaminophen (NORCO) 5-325 MG per  tablet Take 1 tablet by mouth 3 (Three) Times a Day As Needed.     • hydrOXYzine (ATARAX) 25 MG tablet Take 1 tablet by mouth 3 (Three) Times a Day As Needed.     • ibuprofen (ADVIL,MOTRIN) 600 MG tablet Take 1 tablet by mouth Every 6 (Six) Hours As Needed.     • levothyroxine (SYNTHROID, LEVOTHROID) 88 MCG tablet Take 1 tablet by mouth Daily. 90 tablet 1   • lidocaine-prilocaine (EMLA) 2.5-2.5 % cream Apply 1 application topically to the appropriate area as directed 4 (Four) Times a Day.     • metFORMIN ER (GLUCOPHAGE XR) 500 MG 24 hr tablet Take 1 tablet by mouth Daily With Breakfast. 90 tablet 3   • mometasone (NASONEX) 50 MCG/ACT nasal spray 2 sprays into the nostril(s) as directed by provider Daily.     • MYRBETRIQ 50 MG tablet sustained-release 24 hour 24 hr tablet Take 1 tablet by mouth Daily.  11   • omeprazole (priLOSEC) 40 MG capsule Take 1 capsule by mouth Daily.     • raNITIdine (ZANTAC) 300 MG tablet Take 1 tablet by mouth Every Night.     • rizatriptan (MAXALT) 10 MG tablet Take 1 tablet by mouth Daily As Needed. Take 1 tab po at onset of headache. If not resolved within 1 hour, may repeat.  1   • rosuvastatin (CRESTOR) 5 MG tablet Take 1 tablet by mouth Every Night.     • SUMAtriptan (IMITREX) 100 MG tablet Take 1 tablet by mouth Daily As Needed. 1 tab po at onset of migraine- may repeat in 2hr if not gone. Max dose of 2 tabs in 24hrs.     • tamsulosin (FLOMAX) 0.4 MG capsule 24 hr capsule Take 1 capsule by mouth Daily.  11     No facility-administered medications prior to visit.        Patient Active Problem List   Diagnosis   • Acute stress reaction   • Arthritis   • Spinal stenosis of cervical region   • Cyst, eyelid   • Degeneration of lumbar or lumbosacral intervertebral disc   • Degenerative disc disease, cervical   • Herniated cervical disc   • Esophageal stricture   • Family history of diabetes mellitus   • Hair loss   • Hematuria   • Hyperlipidemia   • Hypertension   • Hypothyroidism   • Knee  pain, right   • Obesity   • Osteoarthritis of knee   • Restless leg syndrome   • Cervicalgia   • Thoracic back pain   • Acid reflux disease   • Allergic rhinitis   • Chronic joint pain   • Hernia, diaphragmatic   • History of ulcer disease   • Migraine   • Nerve root disorder   • Syringomyelia, acquired (CMS/HCC)   • Spinal stenosis of thoracic region   • Cervical spondylosis with myelopathy   • Osteoarthritis of carpometacarpal joint of thumb   • Lumbar spondylosis with myelopathy   • Colon polyps   • GERD (gastroesophageal reflux disease)   • IBS (irritable bowel syndrome)   • Spinal stenosis of lumbar region with neurogenic claudication   • Type 2 diabetes mellitus without complication, with long-term current use of insulin (CMS/HCC)   • History of colon polyps       Advanced Care Planning:  Patient has an advance directive - a copy has not been provided. Have asked the patient to send this to us to add to record    Review of Systems   Constitutional: Negative for activity change, appetite change, fatigue, fever and unexpected weight change.   HENT: Positive for congestion and rhinorrhea. Negative for ear pain, sore throat and trouble swallowing.    Eyes: Negative for pain, redness and visual disturbance.   Respiratory: Negative for cough, chest tightness, shortness of breath and wheezing.    Cardiovascular: Negative for chest pain, palpitations and leg swelling.   Gastrointestinal: Positive for constipation. Negative for abdominal pain, nausea and vomiting.        Reflux, controlled with current meds   Endocrine: Negative for polydipsia and polyuria.   Genitourinary: Positive for frequency. Negative for dysuria and vaginal discharge.   Musculoskeletal: Positive for arthralgias, back pain and myalgias. Negative for joint swelling.   Skin: Negative for rash.   Neurological: Positive for headaches. Negative for weakness and numbness.   Hematological: Does not bruise/bleed easily.   Psychiatric/Behavioral: Positive  "for sleep disturbance and suicidal ideas. Negative for dysphoric mood. The patient is not nervous/anxious.        Compared to one year ago, the patient feels her physical health is better.  Compared to one year ago, the patient feels her mental health is better.    Reviewed chart for potential of high risk medication in the elderly: yes  Reviewed chart for potential of harmful drug interactions in the elderly:yes    Objective         Vitals:    07/29/19 0958   BP: 121/84   BP Location: Left arm   Patient Position: Sitting   Pulse: 96   Resp: 18   Temp: 97.6 °F (36.4 °C)   TempSrc: Oral   SpO2: 94%   Weight: 85.9 kg (189 lb 6.4 oz)   Height: 160 cm (63\")       Body mass index is 33.55 kg/m².  Discussed the patient's BMI with her. The BMI is above average; BMI management plan is completed.    Physical Exam   Constitutional: She is oriented to person, place, and time. She appears well-developed and well-nourished. No distress.   HENT:   Head: Normocephalic and atraumatic.   Right Ear: External ear normal.   Left Ear: External ear normal.   Nose: Nose normal.   Mouth/Throat: Oropharynx is clear and moist. No oropharyngeal exudate.   Eyes: Conjunctivae and EOM are normal. Pupils are equal, round, and reactive to light. Right eye exhibits no discharge. Left eye exhibits no discharge. No scleral icterus.   Neck: Normal range of motion. Neck supple. No tracheal deviation present. No thyromegaly present.   Cardiovascular: Normal rate, regular rhythm, normal heart sounds and intact distal pulses. Exam reveals no gallop and no friction rub.   No murmur heard.  Pulmonary/Chest: Breath sounds normal. She has no wheezes. She has no rales.   Abdominal: Soft. Bowel sounds are normal. She exhibits no mass. There is no tenderness. No hernia.   Musculoskeletal: Normal range of motion.        Right shoulder: She exhibits no deformity and normal strength.   Lymphadenopathy:     She has no cervical adenopathy.   Neurological: She is " alert and oriented to person, place, and time. Coordination normal.   Skin: Skin is warm and dry. No rash noted. No erythema.   Psychiatric: She has a normal mood and affect. Her behavior is normal.             Assessment/Plan   Medicare Risks and Personalized Health Plan  CMS Preventative Services Quick Reference  Breast Cancer/Mammogram Screening  Cardiovascular risk  Colon Cancer Screening  Dementia/Memory   Depression/Dysphoria  Diabetic Lab Screening   Immunizations Discussed/Encouraged (specific immunizations; patient believes up to date, recieved flu, pneumonia and shingles and Hep A at [facility] the past year, will obtain records for confirmation )  Inactivity/Sedentary  Obesity/Overweight   Osteoprorosis Risk  Polypharmacy    The above risks/problems have been discussed with the patient.  Pertinent information has been shared with the patient in the After Visit Summary.  Follow up plans and orders are seen below in the Assessment/Plan Section.    Diagnoses and all orders for this visit:    1. Annual physical exam (Primary)    2. History of colon polyps      Follow Up:  Return for Next scheduled follow up.     An After Visit Summary and PPPS were given to the patient.

## 2019-08-21 RX ORDER — BENAZEPRIL HYDROCHLORIDE AND HYDROCHLOROTHIAZIDE 5; 6.25 MG/1; MG/1
TABLET ORAL
Qty: 30 TABLET | Refills: 5 | Status: SHIPPED | OUTPATIENT
Start: 2019-08-21 | End: 2019-11-20 | Stop reason: SDUPTHER

## 2019-08-21 NOTE — TELEPHONE ENCOUNTER
Received refill request for patient's Benazepril- HCTZ.    Last OV with you was 7/29/19  Next OV is 8/26/19.    Med is loaded and ready to be sent.

## 2019-08-26 ENCOUNTER — PROCEDURE VISIT (OUTPATIENT)
Dept: FAMILY MEDICINE CLINIC | Facility: CLINIC | Age: 64
End: 2019-08-26

## 2019-08-26 VITALS
HEART RATE: 112 BPM | WEIGHT: 188 LBS | TEMPERATURE: 97.9 F | SYSTOLIC BLOOD PRESSURE: 123 MMHG | OXYGEN SATURATION: 96 % | DIASTOLIC BLOOD PRESSURE: 80 MMHG | BODY MASS INDEX: 33.31 KG/M2 | HEIGHT: 63 IN

## 2019-08-26 DIAGNOSIS — L91.8 SKIN TAGS, MULTIPLE ACQUIRED: Primary | ICD-10-CM

## 2019-08-26 PROCEDURE — 11200 RMVL SKIN TAGS UP TO&INC 15: CPT | Performed by: FAMILY MEDICINE

## 2019-08-26 NOTE — PROGRESS NOTES
Procedure   Skin Tag Removal  Date/Time: 8/26/2019 12:47 PM  Performed by: Spring Silverio MD  Authorized by: Spring Silverio MD   Preparation: Patient was prepped and draped in the usual sterile fashion.  Local anesthesia used: yes  Anesthesia: local infiltration    Anesthesia:  Local anesthesia used: yes  Local Anesthetic: lidocaine 1% with epinephrine and topical anesthetic  Anesthetic total: 1 mL    Sedation:  Patient sedated: no    Patient tolerance: Patient tolerated the procedure well with no immediate complications  Comments: Patient had 5 fairly wide-based flesh-colored skin tags 2 on left lateral side, 2 on right anterior abdomen, and one under left breast.    She reports these are irritated by clothing rubbing on them.   Informed consent obtained.  Skin tags are snipped off using Betadine for cleansing and sterile curved iris scissors.These pathognomonic lesions are not sent for pathology.  Silver nitrate was used on all 5 lesions to cauterize spot bleeding, areas were cleansed with alcohol, bacitracin and sterile spot bandages were applied.

## 2019-08-26 NOTE — PATIENT INSTRUCTIONS
Skin Tag, Adult    A skin tag (acrochordon) is a soft, extra growth of skin. Most skin tags are flesh-colored and rarely bigger than a pencil eraser. They commonly form near areas where there are folds in the skin, such as the armpit or groin. Skin tags are not dangerous, and they do not spread from person to person (are not contagious).  You may have one skin tag or several. Skin tags do not require treatment. However, your health care provider may recommend removal of a skin tag if it:  · Gets irritated from clothing.  · Bleeds.  · Is visible and unsightly.  Your health care provider can remove skin tags with a simple surgical procedure or a procedure that involves freezing the skin tag.  Follow these instructions at home:  · Watch for any changes in your skin tag. A normal skin tag does not require any other special care at home.  · Take over-the-counter and prescription medicines only as told by your health care provider.  · Keep all follow-up visits as told by your health care provider. This is important.  Contact a health care provider if:  · You have a skin tag that:  ? Becomes painful.  ? Changes color.  ? Bleeds.  ? Swells.  · You develop more skin tags.  This information is not intended to replace advice given to you by your health care provider. Make sure you discuss any questions you have with your health care provider.  Document Released: 01/01/2017 Document Revised: 08/13/2017 Document Reviewed: 01/01/2017  [employer] Interactive Patient Education © 2019 [employer]

## 2019-09-05 ENCOUNTER — OFFICE (AMBULATORY)
Dept: URBAN - METROPOLITAN AREA CLINIC 64 | Facility: CLINIC | Age: 64
End: 2019-09-05

## 2019-09-05 VITALS
HEIGHT: 63 IN | DIASTOLIC BLOOD PRESSURE: 71 MMHG | HEART RATE: 109 BPM | WEIGHT: 194 LBS | SYSTOLIC BLOOD PRESSURE: 118 MMHG

## 2019-09-05 DIAGNOSIS — J40 BRONCHITIS, NOT SPECIFIED AS ACUTE OR CHRONIC: ICD-10-CM

## 2019-09-05 DIAGNOSIS — K21.9 GASTRO-ESOPHAGEAL REFLUX DISEASE WITHOUT ESOPHAGITIS: ICD-10-CM

## 2019-09-05 DIAGNOSIS — F45.8 OTHER SOMATOFORM DISORDERS: ICD-10-CM

## 2019-09-05 PROCEDURE — 99213 OFFICE O/P EST LOW 20 MIN: CPT | Performed by: INTERNAL MEDICINE

## 2019-09-05 RX ORDER — AZITHROMYCIN 500 MG/1
TABLET, FILM COATED ORAL
Qty: 1 | Refills: 0 | Status: COMPLETED
Start: 2019-09-05 | End: 2019-09-06

## 2019-09-05 RX ORDER — ESOMEPRAZOLE MAGNESIUM 40 MG/1
CAPSULE, DELAYED RELEASE ORAL
Qty: 180 | Refills: 4 | Status: ACTIVE

## 2019-09-05 RX ORDER — LUBIPROSTONE 24 UG/1
CAPSULE, GELATIN COATED ORAL
Qty: 180 | Refills: 3 | Status: ACTIVE

## 2019-09-05 RX ORDER — RANITIDINE 300 MG/1
TABLET ORAL
Qty: 90 | Refills: 4 | Status: COMPLETED
End: 2021-07-20

## 2019-09-20 ENCOUNTER — ON CAMPUS - OUTPATIENT (AMBULATORY)
Dept: URBAN - METROPOLITAN AREA HOSPITAL 2 | Facility: HOSPITAL | Age: 64
End: 2019-09-20

## 2019-09-20 VITALS
OXYGEN SATURATION: 100 % | HEART RATE: 98 BPM | DIASTOLIC BLOOD PRESSURE: 75 MMHG | HEIGHT: 63 IN | DIASTOLIC BLOOD PRESSURE: 85 MMHG | OXYGEN SATURATION: 97 % | SYSTOLIC BLOOD PRESSURE: 128 MMHG | HEART RATE: 85 BPM | HEART RATE: 84 BPM | RESPIRATION RATE: 16 BRPM | SYSTOLIC BLOOD PRESSURE: 148 MMHG | SYSTOLIC BLOOD PRESSURE: 150 MMHG | SYSTOLIC BLOOD PRESSURE: 156 MMHG | SYSTOLIC BLOOD PRESSURE: 125 MMHG | HEART RATE: 86 BPM | HEART RATE: 112 BPM | DIASTOLIC BLOOD PRESSURE: 91 MMHG | SYSTOLIC BLOOD PRESSURE: 124 MMHG | WEIGHT: 190 LBS | SYSTOLIC BLOOD PRESSURE: 129 MMHG | HEART RATE: 92 BPM | TEMPERATURE: 97.5 F | DIASTOLIC BLOOD PRESSURE: 83 MMHG | RESPIRATION RATE: 18 BRPM | DIASTOLIC BLOOD PRESSURE: 79 MMHG | HEART RATE: 101 BPM | OXYGEN SATURATION: 94 %

## 2019-09-20 DIAGNOSIS — K21.9 GASTRO-ESOPHAGEAL REFLUX DISEASE WITHOUT ESOPHAGITIS: ICD-10-CM

## 2019-09-20 DIAGNOSIS — R07.89 OTHER CHEST PAIN: ICD-10-CM

## 2019-09-20 DIAGNOSIS — F45.8 OTHER SOMATOFORM DISORDERS: ICD-10-CM

## 2019-09-20 PROCEDURE — 43235 EGD DIAGNOSTIC BRUSH WASH: CPT | Performed by: INTERNAL MEDICINE

## 2019-09-20 RX ORDER — PANTOPRAZOLE SODIUM 40 MG/1
80 TABLET, DELAYED RELEASE ORAL
Qty: 180 | Refills: 4 | Status: COMPLETED
Start: 2019-09-20 | End: 2021-07-20

## 2019-10-07 ENCOUNTER — TELEPHONE (OUTPATIENT)
Dept: FAMILY MEDICINE CLINIC | Facility: CLINIC | Age: 64
End: 2019-10-07

## 2019-10-07 ENCOUNTER — OFFICE VISIT (OUTPATIENT)
Dept: PAIN MEDICINE | Facility: CLINIC | Age: 64
End: 2019-10-07

## 2019-10-07 VITALS
DIASTOLIC BLOOD PRESSURE: 79 MMHG | OXYGEN SATURATION: 96 % | WEIGHT: 188 LBS | BODY MASS INDEX: 33.31 KG/M2 | SYSTOLIC BLOOD PRESSURE: 118 MMHG | RESPIRATION RATE: 16 BRPM | TEMPERATURE: 99.2 F | HEIGHT: 63 IN | HEART RATE: 113 BPM

## 2019-10-07 DIAGNOSIS — M50.30 DEGENERATIVE DISC DISEASE, CERVICAL: ICD-10-CM

## 2019-10-07 DIAGNOSIS — G89.29 CHRONIC BILATERAL LOW BACK PAIN WITH BILATERAL SCIATICA: Primary | Chronic | ICD-10-CM

## 2019-10-07 DIAGNOSIS — M47.12 CERVICAL SPONDYLOSIS WITH MYELOPATHY: ICD-10-CM

## 2019-10-07 DIAGNOSIS — M51.37 DEGENERATION OF LUMBAR OR LUMBOSACRAL INTERVERTEBRAL DISC: ICD-10-CM

## 2019-10-07 DIAGNOSIS — G89.29 CHRONIC BILATERAL THORACIC BACK PAIN: ICD-10-CM

## 2019-10-07 DIAGNOSIS — M54.42 CHRONIC BILATERAL LOW BACK PAIN WITH BILATERAL SCIATICA: Primary | Chronic | ICD-10-CM

## 2019-10-07 DIAGNOSIS — M54.41 CHRONIC BILATERAL LOW BACK PAIN WITH BILATERAL SCIATICA: Primary | Chronic | ICD-10-CM

## 2019-10-07 DIAGNOSIS — M54.2 CERVICALGIA: ICD-10-CM

## 2019-10-07 DIAGNOSIS — M54.6 CHRONIC BILATERAL THORACIC BACK PAIN: ICD-10-CM

## 2019-10-07 DIAGNOSIS — M47.16 LUMBAR SPONDYLOSIS WITH MYELOPATHY: ICD-10-CM

## 2019-10-07 PROCEDURE — 99214 OFFICE O/P EST MOD 30 MIN: CPT | Performed by: ANESTHESIOLOGY

## 2019-10-07 PROCEDURE — G0463 HOSPITAL OUTPT CLINIC VISIT: HCPCS | Performed by: PHYSICAL MEDICINE & REHABILITATION

## 2019-10-07 NOTE — PROGRESS NOTES
Chief Complaint   Patient presents with   • Back Pain     #5-- hydrocodone last dose 10-6 at 2200   • Neck Pain   • Pain     yamile knee         Subjective   Johanna Swan is a 63 y.o. female  who presents to the office for follow-up. Patient is new to me.She has chronic back pain from the neck to the buttocks. Had previous PRASHANTH's in 2014 which were helpful. Discussed scheduling Epidural therapy and patient agreeable. She rates her pain at 5/10 today. Pain descriptors include aching, stabbing, sharp, exhausting, unbearable, tender, shooting, burning, penetrating, radiating and deep. Medication, heat, massage and rest provide some temporary relief. Walking, lying down, folding clothes, washing dishes, cooking and watching TV make the pain worse. Patient reports she takes her hydrocodone about 3 times per week. Denies bowel and bladder incontinence. Last UDS 6/5/2019. MRI L-Spine 4/28/2014 report reviewed (see below). MRI C-Spine 7/1/2014 report reviewed and showed multilevel cervical spondylosis. C5-6 and C6-7 with moderate to severe bilateral neuroforaminal narrowing. At C6-7 level there is moderate central canal stenosis. MRI T-Spine 7/1/2014 report reviewed and showed degenerative changes at the T8-9 and T10-11 levels with mild disc bulges, with mild to moderate central canal narrowing and moderate neural foraminal narrowing.    MRI OF THE LUMBAR SPINE WITHOUT CONTRAST performed April 28, 2014    INDICATION: Lower back pain. Left leg pain. Lumbar disc disorder with myelopathy.    FINDINGS: Levoscoliosis of the lumbar spine. Alignment lumbar spine is otherwise normal. Vertebral body heights are normal. Moderate to severe disc base narrowing at L5-S1. Other disc heights are preserved. Loss of T2 hyperintensity in all lumbar vertebral disc. Conus medullaris terminates at the superior L1 level. On sagittal image 8, there is small, longitudinal T2 hyperintensity in the distal spinal cord the T11-T12 level measuring 14  mm in length. This is not covered on axial images. Spinal  hemangiomas of T11-T11, T12, and L1 vertebral bodies. The marrow signal intensity is otherwise normal. Multilevel chronic small Schmorl's nodes without vertebral body wedging.    T11-T12: Evaluated on sagittal imaging only. There is mild diffuse disc bulge. There is ligament of flavum thickening bilaterally. Mild central canal stenosis without significant cord flattening appreciated. No neural foraminal stenosis.    T12-L1: Negative.    L1-L2: Negative.    L2-L3: Mild disc bulging. Mild facet hypertrophy. No central canal stenosis. Mild bilateral neural foraminal stenosis.    L3-L4: Mild disc bulge. Facet hypertrophy. No significant central canal stenosis. Moderate bilateral neural foraminal stenosis    L4-L5: Diffuse disc bulge. Facet hypertrophy bilaterally. There is no central canal stenosis. Mild bilateral lateral recess stenosis. Mild to moderate bilateral neural foraminal stenosis, left greater than right.    L5-S1: Degenerative disc disease with diffuse disc bulge. Mild endplate spurring. No significant central canal stenosis. Mild bilateral neural foraminal stenosis.    IMPRESSION:  1. Low-grade longitudinal signal abnormality in the most distal spinal cord at the T11-T12 level without associated spinal cord compression. This is not evaluated on axial imaging. Differential diagnosis includes cord edema, cord ischemia, and  demyelinating lesion. Consider MRI of the thoracic spine with dedicated imaging of the distal spinal cord with and without contrast.  2. Degenerative disc disease at L5-S1.  3. Other degenerative lumbar spondylosis detailed above.      PEG Assessment   What number best describes your pain on average in the past week?5  What number best describes how, during the past week, pain has interfered with your enjoyment of life?5  What number best describes how, during the past week, pain has interfered with your general activity?   8      The following portions of the patient's history were reviewed and updated as appropriate: allergies, current medications, past family history, past medical history, past social history, past surgical history and problem list.    Review of Systems   Constitutional: Positive for fatigue. Negative for appetite change.   Respiratory: Negative for chest tightness and shortness of breath.    Cardiovascular: Negative for chest pain.   Gastrointestinal: Positive for constipation and nausea. Negative for abdominal pain, diarrhea and vomiting.   Genitourinary: Negative for difficulty urinating.   Musculoskeletal: Positive for back pain and neck pain.   Neurological: Positive for headaches. Negative for dizziness, weakness and numbness.   Psychiatric/Behavioral: Negative for sleep disturbance.       Vitals:    10/07/19 0929   BP: 118/79   Pulse: 113   Resp: 16   Temp: 99.2 °F (37.3 °C)   SpO2: 96%       Objective   Physical Exam   Constitutional: She is oriented to person, place, and time. She appears well-developed and well-nourished. No distress.   HENT:   Head: Normocephalic and atraumatic.   Eyes: EOM are normal.   PER   Neck: Neck supple.   Cardiovascular: Normal heart sounds.   No murmur heard.  Pulmonary/Chest: Effort normal and breath sounds normal. No respiratory distress. She has no wheezes.   Abdominal: Soft. Bowel sounds are normal. There is no tenderness.   Musculoskeletal:   Neck TTP C1-C7. Slight decreased ROM with flexion, extension, S to S bending and rotation.    Thoracic back: TTP T1-T12.    Low back TTP L4-S1. Slight decreased ROM with flexion, extension and S to S bending.   Neurological: She is alert and oriented to person, place, and time. She displays no atrophy. No sensory deficit.   Reflex Scores:       Tricep reflexes are 0 on the right side and 0 on the left side.       Bicep reflexes are 0 on the right side and 0 on the left side.       Brachioradialis reflexes are 0 on the right side and 0  on the left side.       Patellar reflexes are 0 on the right side and 0 on the left side.       Achilles reflexes are 0 on the right side and 0 on the left side.  MMT: Strength 4/5 RLE, 5/5 LLE.   Skin: Skin is warm and dry.   Psychiatric: She has a normal mood and affect. Her behavior is normal.   Vitals reviewed.          Assessment/Plan   Alberta was seen today for back pain, neck pain and pain.    Diagnoses and all orders for this visit:    Chronic bilateral low back pain with bilateral sciatica    Cervicalgia    Degeneration of lumbar or lumbosacral intervertebral disc    Lumbar spondylosis with myelopathy    Degenerative disc disease, cervical    Cervical spondylosis with myelopathy    Chronic bilateral thoracic back pain    Continue current dose of hydrocodone-acet 5/325. Patient last filled prescription on 9/11/2019 and takes about 3 times per week. She does not need a refill at this time.   Prescription for pain cream with [facility] for Lidocaine 2%, Prilocaine 2%, Topiramate 2.5%, Meloxicam 0.09% and gabapentin.  Schedule Intralaminar PRASHANTH L4/L5 #1.  Following the lumbar series, consider cervical Epidural therapy after the appropriate interval.    Return in about 2 months (around 12/7/2019) for Pain Mgmt and as scheduled for Epidural therapy..         INSPECT REPORT    As part of the patient's treatment plan, I am prescribing controlled substances. The patient has been made aware of appropriate use of such medications, including potential risk of somnolence, limited ability to drive and/or work safely, and the potential for dependence or overdose. It has also bee made clear that these medications are for use by this patient only, without concomitant use of alcohol or other substances unless prescribed.     Patient has completed prescribing agreement detailing terms of continued prescribing of controlled substances, including monitoring INSPECT reports, urine drug screening, and pill counts if  necessary. The patient is aware that inappropriate use will results in cessation of prescribing such medications.    INSPECT report has been reviewed and scanned into the patient's chart.    As the clinician, I personally reviewed the INSPECT from 10/1/2019 while the patient was in the office today.    History and physical exam exhibit continued safe and appropriate use of controlled substances.

## 2019-10-07 NOTE — TELEPHONE ENCOUNTER
Patient called into office and is wondering if you could increase her Gabapentin? She states that the Gabapentin is not helping as much as her Lyrica did. She would prefer one pill if able. Please advise.   She uses [facility] in [place]. She is taking 2 of the 300mg capsules at once daily.     Please advise.

## 2019-10-08 RX ORDER — GABAPENTIN 800 MG/1
800 TABLET ORAL NIGHTLY
Qty: 30 TABLET | Refills: 0 | Status: SHIPPED | OUTPATIENT
Start: 2019-10-08 | End: 2019-10-31 | Stop reason: SDUPTHER

## 2019-10-08 RX ORDER — GABAPENTIN 800 MG/1
1 TABLET ORAL NIGHTLY
COMMUNITY
End: 2019-10-08 | Stop reason: SDUPTHER

## 2019-10-08 NOTE — TELEPHONE ENCOUNTER
Yes please call in 800 mg one at hs #30 and we will discus further at upcoming appt on the 15th. There is a once daily timed release version, Gralise, that we could consider if covered by insurance.

## 2019-10-08 NOTE — TELEPHONE ENCOUNTER
Patient notified. States that she forgot about this prior and had tried Gralise before, but she will try the Dex 800 and discuss this further with you at the next OV.

## 2019-10-11 DIAGNOSIS — E03.9 ACQUIRED HYPOTHYROIDISM: ICD-10-CM

## 2019-10-11 DIAGNOSIS — E11.9 TYPE 2 DIABETES MELLITUS WITHOUT COMPLICATION, WITH LONG-TERM CURRENT USE OF INSULIN (HCC): ICD-10-CM

## 2019-10-11 DIAGNOSIS — Z79.4 TYPE 2 DIABETES MELLITUS WITHOUT COMPLICATION, WITH LONG-TERM CURRENT USE OF INSULIN (HCC): ICD-10-CM

## 2019-10-12 LAB
HBA1C MFR BLD: 6.3 % (ref 4.8–5.6)
TSH SERPL DL<=0.005 MIU/L-ACNC: 1.83 UIU/ML (ref 0.45–4.5)

## 2019-10-14 ENCOUNTER — TELEPHONE (OUTPATIENT)
Dept: FAMILY MEDICINE CLINIC | Facility: CLINIC | Age: 64
End: 2019-10-14

## 2019-10-14 NOTE — TELEPHONE ENCOUNTER
Called and s/w patient. States that  is of course still in hospital and will call to r/s their appointments when he is released. Provider notified of this. Lab result for A1c gave to pt as well.

## 2019-10-14 NOTE — TELEPHONE ENCOUNTER
----- Message from Spring Silverio MD sent at 10/13/2019 10:10 PM EDT -----  Please inform patient that a1c is 6.3, Will discus at upcoming appt on the 15. Please confirm if she intends to keep that appt or if she needs to postpone due to  currently in hospital ( I assume he will likely need to reschedule for a later date after discharge)

## 2019-10-21 ENCOUNTER — FLU SHOT (OUTPATIENT)
Dept: FAMILY MEDICINE CLINIC | Facility: CLINIC | Age: 64
End: 2019-10-21

## 2019-10-21 DIAGNOSIS — Z23 NEED FOR PROPHYLACTIC VACCINATION AND INOCULATION AGAINST INFLUENZA: Primary | ICD-10-CM

## 2019-10-21 PROCEDURE — 90674 CCIIV4 VAC NO PRSV 0.5 ML IM: CPT | Performed by: FAMILY MEDICINE

## 2019-10-21 PROCEDURE — G0008 ADMIN INFLUENZA VIRUS VAC: HCPCS | Performed by: FAMILY MEDICINE

## 2019-10-30 RX ORDER — CELECOXIB 200 MG/1
CAPSULE ORAL
Qty: 30 CAPSULE | Refills: 5 | Status: SHIPPED | OUTPATIENT
Start: 2019-10-30 | End: 2020-03-25

## 2019-10-30 RX ORDER — RIZATRIPTAN BENZOATE 10 MG/1
TABLET ORAL
Qty: 9 TABLET | Refills: 1 | Status: SHIPPED | OUTPATIENT
Start: 2019-10-30 | End: 2020-04-13 | Stop reason: SDUPTHER

## 2019-10-31 DIAGNOSIS — Z79.4 TYPE 2 DIABETES MELLITUS WITHOUT COMPLICATION, WITH LONG-TERM CURRENT USE OF INSULIN (HCC): Primary | ICD-10-CM

## 2019-10-31 DIAGNOSIS — G89.29 CHRONIC BILATERAL LOW BACK PAIN WITH BILATERAL SCIATICA: Chronic | ICD-10-CM

## 2019-10-31 DIAGNOSIS — E11.9 TYPE 2 DIABETES MELLITUS WITHOUT COMPLICATION, WITH LONG-TERM CURRENT USE OF INSULIN (HCC): Primary | ICD-10-CM

## 2019-10-31 DIAGNOSIS — M54.42 CHRONIC BILATERAL LOW BACK PAIN WITH BILATERAL SCIATICA: Chronic | ICD-10-CM

## 2019-10-31 DIAGNOSIS — M54.41 CHRONIC BILATERAL LOW BACK PAIN WITH BILATERAL SCIATICA: Chronic | ICD-10-CM

## 2019-10-31 RX ORDER — GABAPENTIN 800 MG/1
TABLET ORAL
Qty: 30 TABLET | Refills: 5 | Status: SHIPPED | OUTPATIENT
Start: 2019-10-31 | End: 2020-01-21

## 2019-11-04 RX ORDER — ROSUVASTATIN CALCIUM 5 MG/1
TABLET, COATED ORAL
Qty: 90 TABLET | Refills: 3 | Status: SHIPPED | OUTPATIENT
Start: 2019-11-04 | End: 2020-06-25 | Stop reason: SDUPTHER

## 2019-11-12 ENCOUNTER — APPOINTMENT (OUTPATIENT)
Dept: PAIN MEDICINE | Facility: HOSPITAL | Age: 64
End: 2019-11-12

## 2019-11-21 RX ORDER — BENAZEPRIL HYDROCHLORIDE AND HYDROCHLOROTHIAZIDE 5; 6.25 MG/1; MG/1
TABLET ORAL
Qty: 90 TABLET | Refills: 1 | Status: SHIPPED | OUTPATIENT
Start: 2019-11-21 | End: 2020-06-25 | Stop reason: SDUPTHER

## 2019-11-21 NOTE — TELEPHONE ENCOUNTER
Received refill request for patient's Benazepril.   Last OV: 7-29-19  Next OV: 1-   Last Labs: A1c & TSH on 10-11-19; CMP and FLP on 7-11-19.    Med is loaded and ready to be approved. Thanks.

## 2020-01-01 ENCOUNTER — RESULTS ENCOUNTER (OUTPATIENT)
Dept: FAMILY MEDICINE CLINIC | Facility: CLINIC | Age: 65
End: 2020-01-01

## 2020-01-01 DIAGNOSIS — Z79.4 TYPE 2 DIABETES MELLITUS WITHOUT COMPLICATION, WITH LONG-TERM CURRENT USE OF INSULIN (HCC): ICD-10-CM

## 2020-01-01 DIAGNOSIS — E11.9 TYPE 2 DIABETES MELLITUS WITHOUT COMPLICATION, WITH LONG-TERM CURRENT USE OF INSULIN (HCC): ICD-10-CM

## 2020-01-07 DIAGNOSIS — E03.9 ACQUIRED HYPOTHYROIDISM: ICD-10-CM

## 2020-01-08 RX ORDER — LEVOTHYROXINE SODIUM 88 UG/1
88 TABLET ORAL DAILY
Qty: 90 TABLET | Refills: 1 | Status: SHIPPED | OUTPATIENT
Start: 2020-01-08 | End: 2020-06-25

## 2020-01-18 LAB — HBA1C MFR BLD: 6.5 % (ref 4.8–5.6)

## 2020-01-21 ENCOUNTER — OFFICE VISIT (OUTPATIENT)
Dept: FAMILY MEDICINE CLINIC | Facility: CLINIC | Age: 65
End: 2020-01-21

## 2020-01-21 VITALS
HEART RATE: 103 BPM | BODY MASS INDEX: 33.49 KG/M2 | HEIGHT: 63 IN | TEMPERATURE: 98.4 F | DIASTOLIC BLOOD PRESSURE: 81 MMHG | SYSTOLIC BLOOD PRESSURE: 118 MMHG | WEIGHT: 189 LBS | OXYGEN SATURATION: 97 %

## 2020-01-21 DIAGNOSIS — Z79.4 TYPE 2 DIABETES MELLITUS WITHOUT COMPLICATION, WITH LONG-TERM CURRENT USE OF INSULIN (HCC): Primary | ICD-10-CM

## 2020-01-21 DIAGNOSIS — M72.2 PLANTAR FASCIITIS: ICD-10-CM

## 2020-01-21 DIAGNOSIS — M79.671 RIGHT FOOT PAIN: ICD-10-CM

## 2020-01-21 DIAGNOSIS — E66.09 CLASS 1 OBESITY DUE TO EXCESS CALORIES WITHOUT SERIOUS COMORBIDITY WITH BODY MASS INDEX (BMI) OF 33.0 TO 33.9 IN ADULT: ICD-10-CM

## 2020-01-21 DIAGNOSIS — M79.672 PAIN IN BOTH FEET: ICD-10-CM

## 2020-01-21 DIAGNOSIS — M21.612 BUNION OF GREAT TOE OF LEFT FOOT: ICD-10-CM

## 2020-01-21 DIAGNOSIS — M79.671 PAIN IN BOTH FEET: ICD-10-CM

## 2020-01-21 DIAGNOSIS — E11.9 TYPE 2 DIABETES MELLITUS WITHOUT COMPLICATION, WITH LONG-TERM CURRENT USE OF INSULIN (HCC): Primary | ICD-10-CM

## 2020-01-21 DIAGNOSIS — K21.9 GASTROESOPHAGEAL REFLUX DISEASE WITHOUT ESOPHAGITIS: ICD-10-CM

## 2020-01-21 PROCEDURE — 99214 OFFICE O/P EST MOD 30 MIN: CPT | Performed by: FAMILY MEDICINE

## 2020-01-21 RX ORDER — TOPIRAMATE 50 MG/1
50 TABLET, FILM COATED ORAL 2 TIMES DAILY
COMMUNITY
End: 2020-03-25

## 2020-01-21 RX ORDER — LIDOCAINE 50 MG/G
OINTMENT TOPICAL
COMMUNITY
Start: 2019-12-23

## 2020-01-21 RX ORDER — PREGABALIN 50 MG/1
1 CAPSULE ORAL 2 TIMES DAILY
COMMUNITY
Start: 2020-01-16 | End: 2020-07-31

## 2020-01-21 RX ORDER — LIDOCAINE AND TETRACAINE 70; 70 MG/G; MG/G
CREAM TOPICAL
COMMUNITY
Start: 2019-12-23

## 2020-01-21 RX ORDER — NAPROXEN SODIUM 375 MG/1
TABLET, FILM COATED, EXTENDED RELEASE ORAL
COMMUNITY
Start: 2019-12-23 | End: 2020-03-25

## 2020-01-21 NOTE — PROGRESS NOTES
Subjective   Johanna Swan is a 64 y.o. female.   Chief Complaint   Patient presents with   • Diabetes   • Hypertension       Here to follow up on chronic medical conditions including diet-controlled diabetes mellitus, hypertension, hyperlipidemia, and hypothyroidism. She had labs at [facility] in [place].     She has a history of diabetes, no side effects on Metformin, home glucose 90 day average 129. A1C 6.5.  She admits she does not eat well whenever junk food she wants.    Want to see new podiatrist. Current Dr , Dr Hunt, Podiatrist told would order Diabetic shoes, never did.  History of plantar fasciitis, now having more pain in her forefoot      The following portions of the patient's history were reviewed and updated as appropriate: allergies, current medications, past family history, past medical history, past social history, past surgical history and problem list.    Review of Systems   Constitutional: Negative for appetite change, fatigue, fever and unexpected weight loss.   HENT: Positive for hearing loss and tinnitus. Negative for congestion, ear discharge and ear pain.    Eyes: Negative for visual disturbance.   Respiratory: Negative for cough, shortness of breath and wheezing.    Cardiovascular: Negative for chest pain, palpitations and leg swelling.        Patient does report occasional odd/empty feeling in her chest, is not a pressure or a pain   Gastrointestinal: Negative for abdominal pain, constipation, diarrhea, nausea, vomiting and indigestion.   Musculoskeletal: Positive for arthralgias, back pain and myalgias.   Skin: Negative for rash.   Neurological: Positive for headache. Negative for numbness.   Psychiatric/Behavioral: Negative for sleep disturbance and depressed mood.       Objective    Vitals:    01/21/20 1017   BP: 118/81   Pulse: 103   Temp: 98.4 °F (36.9 °C)   SpO2: 97%       Physical Exam   Constitutional: She is oriented to person, place, and time. She appears well-developed and  well-nourished.   HENT:   Head: Normocephalic and atraumatic.   Right Ear: External ear normal.   Left Ear: External ear normal.   Mouth/Throat: Oropharynx is clear and moist.   Eyes: Pupils are equal, round, and reactive to light. Conjunctivae and EOM are normal.   Neck: No JVD present. No thyromegaly present.   Cardiovascular: Normal rate, regular rhythm and normal heart sounds.   No murmur heard.  Pulmonary/Chest: Breath sounds normal. She has no wheezes. She has no rales.   Musculoskeletal: She exhibits no edema.   Bunion left foot toe  Mild tenderness to palpation along first and second toe laterally anterior to the arhes  Diabetic foot exam  Normal monofilament testing bilaterally   Lymphadenopathy:     She has no cervical adenopathy.   Neurological: She is alert and oriented to person, place, and time.   Skin: Skin is warm and dry. No rash noted.   Psychiatric: She has a normal mood and affect.   Nursing note and vitals reviewed.          Lab Results   Component Value Date    HGBA1C 6.5 (H) 01/17/2020    HGBA1C 6.3 (H) 10/11/2019     Previous labs in October, A1c is 6.2, lipid panel range, TSH 3.35, glucose 111.    Assessment/Plan   Alberta was seen today for diabetes and hypertension.    Diagnoses and all orders for this visit:    Type 2 diabetes mellitus without complication, with long-term current use of insulin (CMS/LTAC, located within [facility])  -     Ambulatory Referral to Nutrition Services  -     Ambulatory Referral to Podiatry    Class 1 obesity due to excess calories without serious comorbidity with body mass index (BMI) of 33.0 to 33.9 in adult    Gastroesophageal reflux disease without esophagitis    Pain in both feet  -     Ambulatory Referral to Podiatry    Plantar fasciitis  -     Ambulatory Referral to Podiatry    Right foot pain  -     Ambulatory Referral to Podiatry     Bunion of left foot  -     Ambulatory Referral to Podiatry    Foot pain referral  to podiatry.  Continue  medications per list.  Discussed low  concentrated sweet low-carb diet, increase physical activity, and weight reduction.  AVS given with information on type 2 diabetes self-care and nutrition. Referral to Dietitian for further education    Return in about 3 months (around 4/21/2020) for Recheck diabetes and blood pressure, or PRN concerns.

## 2020-01-21 NOTE — PATIENT INSTRUCTIONS
Type 2 Diabetes Mellitus, Self Care, Adult  Caring for yourself after you have been diagnosed with type 2 diabetes (type 2 diabetes mellitus) means keeping your blood sugar (glucose) under control with a balance of:  · Nutrition.  · Exercise.  · Lifestyle changes.  · Medicines or insulin, if necessary.  · Support from your team of health care providers and others.  The following information explains what you need to know to manage your diabetes at home.  What are the risks?  Having diabetes can put you at risk for other long-term (chronic) conditions, such as heart disease and kidney disease. Your health care provider may prescribe medicines to help prevent complications from diabetes. These medicines may include:  · Aspirin.  · Medicine to lower cholesterol.  · Medicine to control blood pressure.  How to monitor blood glucose    · Check your blood glucose every day, as often as told by your health care provider.  · Have your A1c (hemoglobin A1c) level checked two or more times a year, or as often as told by your health care provider.  Your health care provider will set individualized treatment goals for you. Generally, the goal of treatment is to maintain the following blood glucose levels:  · Before meals (preprandial):  mg/dL (4.4-7.2 mmol/L).  · After meals (postprandial): below 180 mg/dL (10 mmol/L).  · A1c level: less than 7%.  How to manage hyperglycemia and hypoglycemia  Hyperglycemia symptoms  Hyperglycemia, also called high blood glucose, occurs when blood glucose is too high. Make sure you know the early signs of hyperglycemia, such as:  · Increased thirst.  · Hunger.  · Feeling very tired.  · Needing to urinate more often than usual.  · Blurry vision.  Hypoglycemia symptoms  Hypoglycemia, also called low blood glucose, occurs with a blood glucose level at or below 70 mg/dL (3.9 mmol/L). The risk for hypoglycemia increases during or after exercise, during sleep, during illness, and when skipping  meals or not eating for a long time (fasting).  It is important to know the symptoms of hypoglycemia and treat it right away. Always have a 15-gram rapid-acting carbohydrate snack with you to treat low blood glucose. Family members and close friends should also know the symptoms and should understand how to treat hypoglycemia, in case you are not able to treat yourself. Symptoms may include:  · Hunger.  · Anxiety.  · Sweating and feeling clammy.  · Confusion.  · Dizziness or feeling light-headed.  · Sleepiness.  · Nausea.  · Increased heart rate.  · Headache.  · Blurry vision.  · Irritability.  · A change in coordination.  · Tingling or numbness around the mouth, lips, or tongue.  · Restless sleep.  · Fainting.  · Seizure.  Treating hypoglycemia  If you are alert and able to swallow safely, follow the 15:15 rule:  · Take 15 grams of a rapid-acting carbohydrate. Talk with your health care provider about how much you should take.  · Rapid-acting options include:  ? Glucose pills (take 15 grams).  ? 6-8 pieces of hard candy.  ? 4-6 oz (120-150 mL) of fruit juice.  ? 4-6 oz (120-150 mL) of regular (not diet) soda.  ? 1 Tbsp (15 mL) honey or sugar.  · Check your blood glucose 15 minutes after you take the carbohydrate.  · If the repeat blood glucose level is still at or below 70 mg/dL (3.9 mmol/L), take 15 grams of a carbohydrate again.  · If your blood glucose level does not increase above 70 mg/dL (3.9 mmol/L) after 3 tries, seek emergency medical care.  · After your blood glucose level returns to normal, eat a meal or a snack within 1 hour.  Treating severe hypoglycemia  Severe hypoglycemia is when your blood glucose level is at or below 54 mg/dL (3 mmol/L). Severe hypoglycemia is an emergency. Do not wait to see if the symptoms will go away. Get medical help right away. Call your local emergency services (911 in the [place]  If you have severe hypoglycemia and you cannot eat or drink, you may need an injection of  glucagon. A family member or close friend should learn how to check your blood glucose and how to give you a glucagon injection. Ask your health care provider if you need to have an emergency glucagon injection kit available.  Severe hypoglycemia may need to be treated in a hospital. The treatment may include getting glucose through an IV. You may also need treatment for the cause of your hypoglycemia.  Follow these instructions at home:  Take diabetes medicines as told  · If your health care provider prescribed insulin or diabetes medicines, take them every day.  · Do not run out of insulin or other diabetes medicines that you take. Plan ahead so you always have these available.  · If you use insulin, adjust your dosage based on how physically active you are and what foods you eat. Your health care provider will tell you how to adjust your dosage.  Make healthy food choices    The things that you eat and drink affect your blood glucose and your insulin dosage. Making good choices helps to control your diabetes and prevent other health problems. A healthy meal plan includes eating lean proteins, complex carbohydrates, fresh fruits and vegetables, low-fat dairy products, and healthy fats.  Make an appointment to see a diet and nutrition specialist (registered dietitian) to help you create an eating plan that is right for you. Make sure that you:  · Follow instructions from your health care provider about eating or drinking restrictions.  · Drink enough fluid to keep your urine pale yellow.  · Keep a record of the carbohydrates that you eat. Do this by reading food labels and learning the standard serving sizes of foods.  · Follow your sick day plan whenever you cannot eat or drink as usual. Make this plan in advance with your health care provider.    Stay active  Exercise regularly, as told by your health care provider. This may include:  · Stretching and doing strength exercises, such as yoga or weightlifting, 2 or  more times a week.  · Doing 150 minutes or more of moderate-intensity or vigorous-intensity exercise each week. This could be brisk walking, biking, or water aerobics.  ? Spread out your activity over 3 or more days of the week.  ? Do not go more than 2 days in a row without doing some kind of physical activity.  When you start a new exercise or activity, work with your health care provider to adjust your insulin, medicines, or food intake as needed.  Make healthy lifestyle choices  · Do not use any tobacco products, such as cigarettes, chewing tobacco, and e-cigarettes. If you need help quitting, ask your health care provider.  · If your health care provider says that alcohol is safe for you, limit alcohol intake to no more than 1 drink per day for nonpregnant women and 2 drinks per day for men. One drink equals 12 oz of beer (355 mL), 5 oz of wine (148 mL), or 1½ oz of hard liquor (44 mL).  · Learn to manage stress. If you need help with this, ask your health care provider.  Care for your body    · Keep your immunizations up to date. In addition to getting vaccinations as told by your health care provider, it is recommended that you get vaccinated against the following illnesses:  ? The flu (influenza). Get a flu shot every year.  ? Pneumonia.  ? Hepatitis B.  · Schedule an eye exam soon after your diagnosis, and then one time every year after that.  · Check your skin and feet every day for cuts, bruises, redness, blisters, or sores. Schedule a foot exam with your health care provider once every year.  · Brush your teeth and gums two times a day, and floss one or more times a day. Visit your dentist one or more times every 6 months.  · Maintain a healthy weight.  General instructions  · Take over-the-counter and prescription medicines only as told by your health care provider.  · Share your diabetes management plan with people in your workplace, school, and household.  · Carry a medical alert card or wear medical  alert brent.  · Keep all follow-up visits as told by your health care provider. This is important.  Questions to ask your health care provider  · Do I need to meet with a diabetes educator?  · Where can I find a support group for people with diabetes?  Where to find more information  For more information about diabetes, visit:  · American Diabetes Association (ADA): www.diabetes.org  · American Association of Diabetes Educators (AADE): www.diabeteseducator.org  Summary  · Caring for yourself after you have been diagnosed with (type 2 diabetes mellitus) means keeping your blood sugar (glucose) under control with a balance of nutrition, exercise, lifestyle changes, and medicine.  · Check your blood glucose every day, as often as told by your health care provider.  · Having diabetes can put you at risk for other long-term (chronic) conditions, such as heart disease and kidney disease. Your health care provider may prescribe medicines to help prevent complications from diabetes.  · Keep all follow-up visits as told by your health care provider. This is important.  This information is not intended to replace advice given to you by your health care provider. Make sure you discuss any questions you have with your health care provider.  Document Released: 04/10/2017 Document Revised: 06/10/2019 Document Reviewed: 01/20/2017  [employer] Interactive Patient Education © 2019 [employer]    Diabetes Mellitus and Nutrition, Adult  When you have diabetes (diabetes mellitus), it is very important to have healthy eating habits because your blood sugar (glucose) levels are greatly affected by what you eat and drink. Eating healthy foods in the appropriate amounts, at about the same times every day, can help you:  · Control your blood glucose.  · Lower your risk of heart disease.  · Improve your blood pressure.  · Reach or maintain a healthy weight.  Every person with diabetes is different, and each person has different needs for a  meal plan. Your health care provider may recommend that you work with a diet and nutrition specialist (dietitian) to make a meal plan that is best for you. Your meal plan may vary depending on factors such as:  · The calories you need.  · The medicines you take.  · Your weight.  · Your blood glucose, blood pressure, and cholesterol levels.  · Your activity level.  · Other health conditions you have, such as heart or kidney disease.  How do carbohydrates affect me?  Carbohydrates, also called carbs, affect your blood glucose level more than any other type of food. Eating carbs naturally raises the amount of glucose in your blood. Carb counting is a method for keeping track of how many carbs you eat. Counting carbs is important to keep your blood glucose at a healthy level, especially if you use insulin or take certain oral diabetes medicines.  It is important to know how many carbs you can safely have in each meal. This is different for every person. Your dietitian can help you calculate how many carbs you should have at each meal and for each snack.  Foods that contain carbs include:  · Bread, cereal, rice, pasta, and crackers.  · Potatoes and corn.  · Peas, beans, and lentils.  · Milk and yogurt.  · Fruit and juice.  · Desserts, such as cakes, cookies, ice cream, and candy.  How does alcohol affect me?  Alcohol can cause a sudden decrease in blood glucose (hypoglycemia), especially if you use insulin or take certain oral diabetes medicines. Hypoglycemia can be a life-threatening condition. Symptoms of hypoglycemia (sleepiness, dizziness, and confusion) are similar to symptoms of having too much alcohol.  If your health care provider says that alcohol is safe for you, follow these guidelines:  · Limit alcohol intake to no more than 1 drink per day for nonpregnant women and 2 drinks per day for men. One drink equals 12 oz of beer, 5 oz of wine, or 1½ oz of hard liquor.  · Do not drink on an empty stomach.  · Keep  "yourself hydrated with water, diet soda, or unsweetened iced tea.  · Keep in mind that regular soda, juice, and other mixers may contain a lot of sugar and must be counted as carbs.  What are tips for following this plan?    Reading food labels  · Start by checking the serving size on the \"Nutrition Facts\" label of packaged foods and drinks. The amount of calories, carbs, fats, and other nutrients listed on the label is based on one serving of the item. Many items contain more than one serving per package.  · Check the total grams (g) of carbs in one serving. You can calculate the number of servings of carbs in one serving by dividing the total carbs by 15. For example, if a food has 30 g of total carbs, it would be equal to 2 servings of carbs.  · Check the number of grams (g) of saturated and trans fats in one serving. Choose foods that have low or no amount of these fats.  · Check the number of milligrams (mg) of salt (sodium) in one serving. Most people should limit total sodium intake to less than 2,300 mg per day.  · Always check the nutrition information of foods labeled as \"low-fat\" or \"nonfat\". These foods may be higher in added sugar or refined carbs and should be avoided.  · Talk to your dietitian to identify your daily goals for nutrients listed on the label.  Shopping  · Avoid buying canned, premade, or processed foods. These foods tend to be high in fat, sodium, and added sugar.  · Shop around the outside edge of the grocery store. This includes fresh fruits and vegetables, bulk grains, fresh meats, and fresh dairy.  Cooking  · Use low-heat cooking methods, such as baking, instead of high-heat cooking methods like deep frying.  · Cook using healthy oils, such as olive, canola, or sunflower oil.  · Avoid cooking with butter, cream, or high-fat meats.  Meal planning  · Eat meals and snacks regularly, preferably at the same times every day. Avoid going long periods of time without eating.  · Eat foods " high in fiber, such as fresh fruits, vegetables, beans, and whole grains. Talk to your dietitian about how many servings of carbs you can eat at each meal.  · Eat 4-6 ounces (oz) of lean protein each day, such as lean meat, chicken, fish, eggs, or tofu. One oz of lean protein is equal to:  ? 1 oz of meat, chicken, or fish.  ? 1 egg.  ? ¼ cup of tofu.  · Eat some foods each day that contain healthy fats, such as avocado, nuts, seeds, and fish.  Lifestyle  · Check your blood glucose regularly.  · Exercise regularly as told by your health care provider. This may include:  ? 150 minutes of moderate-intensity or vigorous-intensity exercise each week. This could be brisk walking, biking, or water aerobics.  ? Stretching and doing strength exercises, such as yoga or weightlifting, at least 2 times a week.  · Take medicines as told by your health care provider.  · Do not use any products that contain nicotine or tobacco, such as cigarettes and e-cigarettes. If you need help quitting, ask your health care provider.  · Work with a counselor or diabetes educator to identify strategies to manage stress and any emotional and social challenges.  Questions to ask a health care provider  · Do I need to meet with a diabetes educator?  · Do I need to meet with a dietitian?  · What number can I call if I have questions?  · When are the best times to check my blood glucose?  Where to find more information:  · American Diabetes Association: diabetes.org  · Academy of Nutrition and Dietetics: www.eatright.org  · National [place] of Diabetes and Digestive and Kidney Diseases (NIH): www.niddk.nih.gov  Summary  · A healthy meal plan will help you control your blood glucose and maintain a healthy lifestyle.  · Working with a diet and nutrition specialist (dietitian) can help you make a meal plan that is best for you.  · Keep in mind that carbohydrates (carbs) and alcohol have immediate effects on your blood glucose levels. It is important  to count carbs and to use alcohol carefully.  This information is not intended to replace advice given to you by your health care provider. Make sure you discuss any questions you have with your health care provider.  Document Released: 09/14/2006 Document Revised: 07/18/2018 Document Reviewed: 01/22/2018  [employer] Interactive Patient Education © 2019 [employer]

## 2020-03-16 ENCOUNTER — TELEPHONE (OUTPATIENT)
Dept: FAMILY MEDICINE CLINIC | Facility: CLINIC | Age: 65
End: 2020-03-16

## 2020-03-16 NOTE — TELEPHONE ENCOUNTER
Patient calling and stating that she got an email from [facility] and notifying them that a patient has been tested positive for the [place] at [facility]. [place] the infected person was 3 days.  Patient is experiencing a deep cough, scratchy throat and body aches. Patient denies fever. Patient has already called the [facility] department of health and they advised her to self quarantine for 14 days.  I also advised patient she needs to isolate to one room and use one dedicated bathroom. Patient voiced understanding. Patient also knows if she develops a fever or has soa,  she can contact the urgent care center or an Ed and see if they want her to come in for testing.

## 2020-03-18 RX ORDER — ALBUTEROL SULFATE 90 UG/1
AEROSOL, METERED RESPIRATORY (INHALATION)
Qty: 3 INHALER | Refills: 3 | Status: SHIPPED | OUTPATIENT
Start: 2020-03-18 | End: 2020-06-25 | Stop reason: SDUPTHER

## 2020-03-18 NOTE — TELEPHONE ENCOUNTER
I am renewing albuterol.  Please check on her status.  I understand she was in self quarantine due to potential COVID-19 exposure.  Ask if she is developing any symptoms, particularly shortness of breath, cough, or fever.  Also remind to stay in self quarantine someone else will need to  this prescription for her.

## 2020-03-25 PROCEDURE — 87635 SARS-COV-2 COVID-19 AMP PRB: CPT | Performed by: NURSE PRACTITIONER

## 2020-03-25 PROCEDURE — U0003 INFECTIOUS AGENT DETECTION BY NUCLEIC ACID (DNA OR RNA); SEVERE ACUTE RESPIRATORY SYNDROME CORONAVIRUS 2 (SARS-COV-2) (CORONAVIRUS DISEASE [COVID-19]), AMPLIFIED PROBE TECHNIQUE, MAKING USE OF HIGH THROUGHPUT TECHNOLOGIES AS DESCRIBED BY CMS-2020-01-R: HCPCS | Performed by: NURSE PRACTITIONER

## 2020-03-26 ENCOUNTER — TELEPHONE (OUTPATIENT)
Dept: FAMILY MEDICINE CLINIC | Facility: CLINIC | Age: 65
End: 2020-03-26

## 2020-03-26 DIAGNOSIS — E11.9 TYPE 2 DIABETES MELLITUS WITHOUT COMPLICATION, WITH LONG-TERM CURRENT USE OF INSULIN (HCC): Primary | ICD-10-CM

## 2020-03-26 DIAGNOSIS — Z79.4 TYPE 2 DIABETES MELLITUS WITHOUT COMPLICATION, WITH LONG-TERM CURRENT USE OF INSULIN (HCC): Primary | ICD-10-CM

## 2020-03-26 NOTE — TELEPHONE ENCOUNTER
----- Message from Johanna Swan sent at 3/26/2020  2:27 PM EDT -----  Regarding: Prescription Question  Contact: [phone]  Dr Collins,         I am writing to request a refill on my test strips for my glucose meter. Would you please send in the refill for the VERIO test strip please instead of the ultra?     Thank you,     Dennis Swan

## 2020-03-31 ENCOUNTER — TELEPHONE (OUTPATIENT)
Dept: FAMILY MEDICINE CLINIC | Facility: CLINIC | Age: 65
End: 2020-03-31

## 2020-03-31 NOTE — TELEPHONE ENCOUNTER
I am assuming she does have an appointment with ENT on Friday.  Let her know that if the irritation gets worse we can work her in tomorrow. Ootherwise if it is just a little cotton in her ear, there should not be harm in waiting until Friday.

## 2020-03-31 NOTE — TELEPHONE ENCOUNTER
Patient called said she spoke with the nurse about something she had done, She said she was cleaning her ears with Qtips and some of it got stuck in her ear (no pain) just irritation. Wanting to know if  wants her to wait till Friday to see ENT or come into the office and see Dr. Londono

## 2020-04-13 DIAGNOSIS — Z79.4 TYPE 2 DIABETES MELLITUS WITHOUT COMPLICATION, WITH LONG-TERM CURRENT USE OF INSULIN (HCC): ICD-10-CM

## 2020-04-13 DIAGNOSIS — E11.9 TYPE 2 DIABETES MELLITUS WITHOUT COMPLICATION, WITH LONG-TERM CURRENT USE OF INSULIN (HCC): ICD-10-CM

## 2020-04-13 DIAGNOSIS — G43.809 OTHER MIGRAINE WITHOUT STATUS MIGRAINOSUS, NOT INTRACTABLE: Primary | ICD-10-CM

## 2020-04-13 DIAGNOSIS — J30.1 SEASONAL ALLERGIC RHINITIS DUE TO POLLEN: ICD-10-CM

## 2020-04-13 RX ORDER — RIZATRIPTAN BENZOATE 10 MG/1
10 TABLET ORAL ONCE AS NEEDED
Qty: 9 TABLET | Refills: 5 | Status: SHIPPED | OUTPATIENT
Start: 2020-04-13 | End: 2022-01-08 | Stop reason: SDUPTHER

## 2020-04-13 RX ORDER — METFORMIN HYDROCHLORIDE 500 MG/1
500 TABLET, EXTENDED RELEASE ORAL
Qty: 90 TABLET | Refills: 3 | Status: SHIPPED | OUTPATIENT
Start: 2020-04-13 | End: 2020-06-08 | Stop reason: RX

## 2020-04-13 RX ORDER — MOMETASONE FUROATE 50 UG/1
2 SPRAY, METERED NASAL DAILY
Qty: 1 EACH | Refills: 12 | Status: SHIPPED | OUTPATIENT
Start: 2020-04-13 | End: 2020-06-25 | Stop reason: SDUPTHER

## 2020-06-08 ENCOUNTER — TELEPHONE (OUTPATIENT)
Dept: FAMILY MEDICINE CLINIC | Facility: CLINIC | Age: 65
End: 2020-06-08

## 2020-06-08 DIAGNOSIS — Z79.4 TYPE 2 DIABETES MELLITUS WITHOUT COMPLICATION, WITH LONG-TERM CURRENT USE OF INSULIN (HCC): Primary | ICD-10-CM

## 2020-06-08 DIAGNOSIS — E11.9 TYPE 2 DIABETES MELLITUS WITHOUT COMPLICATION, WITH LONG-TERM CURRENT USE OF INSULIN (HCC): Primary | ICD-10-CM

## 2020-06-08 NOTE — TELEPHONE ENCOUNTER
Please let patient know I have sent in regular metformin, she is to take 1 tablet with breakfast daily.

## 2020-06-08 NOTE — TELEPHONE ENCOUNTER
Patient called asking if we have received anything on her from her pharmacy about the recall of her Metformin and was wanting to know what  wants to send in

## 2020-06-16 RX ORDER — BACLOFEN 10 MG/1
10 TABLET ORAL 2 TIMES DAILY
Qty: 180 TABLET | Refills: 0 | OUTPATIENT
Start: 2020-06-16

## 2020-06-16 NOTE — TELEPHONE ENCOUNTER
Baclofen is on her medication list however it is listed as historical, I do not believe I have previously prescribed this medication for her.  It may have been prescribed by her neurologist.  Please ask her to have it filled from original prescribing provider.  If for some reason this is not possible I need to know why she is taking this medication, if it is effective, and if is she having any side effects, before I can consider prescribing it.

## 2020-06-16 NOTE — TELEPHONE ENCOUNTER
Spoke with patient who stated she does get from Neurologist and would contact their office in regards

## 2020-06-25 ENCOUNTER — TELEPHONE (OUTPATIENT)
Dept: FAMILY MEDICINE CLINIC | Facility: CLINIC | Age: 65
End: 2020-06-25

## 2020-06-25 DIAGNOSIS — J30.1 SEASONAL ALLERGIC RHINITIS DUE TO POLLEN: ICD-10-CM

## 2020-06-25 DIAGNOSIS — E11.9 TYPE 2 DIABETES MELLITUS WITHOUT COMPLICATION, WITH LONG-TERM CURRENT USE OF INSULIN (HCC): ICD-10-CM

## 2020-06-25 DIAGNOSIS — E03.9 ACQUIRED HYPOTHYROIDISM: ICD-10-CM

## 2020-06-25 DIAGNOSIS — Z79.4 TYPE 2 DIABETES MELLITUS WITHOUT COMPLICATION, WITH LONG-TERM CURRENT USE OF INSULIN (HCC): ICD-10-CM

## 2020-06-25 RX ORDER — HYDROXYZINE HYDROCHLORIDE 25 MG/1
25 TABLET, FILM COATED ORAL 3 TIMES DAILY PRN
Qty: 30 TABLET | Refills: 0 | Status: SHIPPED | OUTPATIENT
Start: 2020-06-25 | End: 2021-01-27

## 2020-06-25 RX ORDER — ALBUTEROL SULFATE 90 UG/1
2 AEROSOL, METERED RESPIRATORY (INHALATION) EVERY 6 HOURS PRN
Qty: 3 INHALER | Refills: 3 | Status: SHIPPED | OUTPATIENT
Start: 2020-06-25 | End: 2022-09-14 | Stop reason: SDUPTHER

## 2020-06-25 RX ORDER — MOMETASONE FUROATE 50 UG/1
2 SPRAY, METERED NASAL DAILY
Qty: 3 EACH | Refills: 3 | Status: SHIPPED | OUTPATIENT
Start: 2020-06-25 | End: 2022-09-14 | Stop reason: SDUPTHER

## 2020-06-25 RX ORDER — ROSUVASTATIN CALCIUM 5 MG/1
5 TABLET, COATED ORAL DAILY
Qty: 90 TABLET | Refills: 3 | Status: SHIPPED | OUTPATIENT
Start: 2020-06-25 | End: 2021-06-07

## 2020-06-25 RX ORDER — LEVOTHYROXINE SODIUM 88 UG/1
88 TABLET ORAL DAILY
Qty: 90 TABLET | Refills: 1 | Status: SHIPPED | OUTPATIENT
Start: 2020-06-25 | End: 2020-12-09

## 2020-06-25 RX ORDER — MOMETASONE FUROATE 50 UG/1
2 SPRAY, METERED NASAL DAILY
Qty: 1 EACH | Refills: 12 | Status: SHIPPED | OUTPATIENT
Start: 2020-06-25 | End: 2020-06-25

## 2020-06-25 RX ORDER — BENAZEPRIL HYDROCHLORIDE AND HYDROCHLOROTHIAZIDE 5; 6.25 MG/1; MG/1
1 TABLET ORAL DAILY
Qty: 90 TABLET | Refills: 1 | Status: SHIPPED | OUTPATIENT
Start: 2020-06-25 | End: 2020-09-22 | Stop reason: SDUPTHER

## 2020-06-30 ENCOUNTER — PATIENT MESSAGE (OUTPATIENT)
Dept: FAMILY MEDICINE CLINIC | Facility: CLINIC | Age: 65
End: 2020-06-30

## 2020-06-30 DIAGNOSIS — K58.9 IRRITABLE BOWEL SYNDROME, UNSPECIFIED TYPE: Primary | ICD-10-CM

## 2020-06-30 RX ORDER — DICYCLOMINE HYDROCHLORIDE 10 MG/1
10 CAPSULE ORAL
Qty: 60 CAPSULE | Refills: 0 | Status: SHIPPED | OUTPATIENT
Start: 2020-06-30 | End: 2021-08-31

## 2020-06-30 NOTE — TELEPHONE ENCOUNTER
From: Johanna Swan  To: Spring Silverio MD  Sent: 6/30/2020 2:00 PM EDT  Subject: Prescription Question    Dr Collins   would you be able to send a refill for my dicyclomine 10 mg tablets to [facility] in [place]? I have a bottle, the last one you prescribed for me was March 2018 but I'm having really bad spasm in my rectum. You filled it for me on March 3, 2018.     Thank you

## 2020-07-15 DIAGNOSIS — Z79.4 TYPE 2 DIABETES MELLITUS WITHOUT COMPLICATION, WITH LONG-TERM CURRENT USE OF INSULIN (HCC): ICD-10-CM

## 2020-07-15 DIAGNOSIS — E78.01 FAMILIAL HYPERCHOLESTEROLEMIA: ICD-10-CM

## 2020-07-15 DIAGNOSIS — E03.9 ACQUIRED HYPOTHYROIDISM: Primary | ICD-10-CM

## 2020-07-15 DIAGNOSIS — E11.9 TYPE 2 DIABETES MELLITUS WITHOUT COMPLICATION, WITH LONG-TERM CURRENT USE OF INSULIN (HCC): ICD-10-CM

## 2020-07-15 DIAGNOSIS — H91.93 BILATERAL HEARING LOSS, UNSPECIFIED HEARING LOSS TYPE: Primary | ICD-10-CM

## 2020-07-22 ENCOUNTER — RESULTS ENCOUNTER (OUTPATIENT)
Dept: FAMILY MEDICINE CLINIC | Facility: CLINIC | Age: 65
End: 2020-07-22

## 2020-07-22 DIAGNOSIS — E03.9 ACQUIRED HYPOTHYROIDISM: ICD-10-CM

## 2020-07-22 DIAGNOSIS — E11.9 TYPE 2 DIABETES MELLITUS WITHOUT COMPLICATION, WITH LONG-TERM CURRENT USE OF INSULIN (HCC): ICD-10-CM

## 2020-07-22 DIAGNOSIS — E78.01 FAMILIAL HYPERCHOLESTEROLEMIA: ICD-10-CM

## 2020-07-22 DIAGNOSIS — Z79.4 TYPE 2 DIABETES MELLITUS WITHOUT COMPLICATION, WITH LONG-TERM CURRENT USE OF INSULIN (HCC): ICD-10-CM

## 2020-07-28 LAB
ALBUMIN SERPL-MCNC: 4.2 G/DL (ref 3.8–4.8)
ALBUMIN/GLOB SERPL: 1.6 {RATIO} (ref 1.2–2.2)
ALP SERPL-CCNC: 87 IU/L (ref 39–117)
ALT SERPL-CCNC: 10 IU/L (ref 0–32)
AST SERPL-CCNC: 11 IU/L (ref 0–40)
BILIRUB SERPL-MCNC: 0.8 MG/DL (ref 0–1.2)
BUN SERPL-MCNC: 19 MG/DL (ref 8–27)
BUN/CREAT SERPL: 23 (ref 12–28)
CALCIUM SERPL-MCNC: 9.3 MG/DL (ref 8.7–10.3)
CHLORIDE SERPL-SCNC: 104 MMOL/L (ref 96–106)
CHOLEST SERPL-MCNC: 178 MG/DL (ref 100–199)
CO2 SERPL-SCNC: 24 MMOL/L (ref 20–29)
CREAT SERPL-MCNC: 0.82 MG/DL (ref 0.57–1)
GLOBULIN SER CALC-MCNC: 2.7 G/DL (ref 1.5–4.5)
GLUCOSE SERPL-MCNC: 148 MG/DL (ref 65–99)
HBA1C MFR BLD: 6.6 % (ref 4.8–5.6)
HDLC SERPL-MCNC: 56 MG/DL
LDLC SERPL CALC-MCNC: 90 MG/DL (ref 0–99)
POTASSIUM SERPL-SCNC: 4.3 MMOL/L (ref 3.5–5.2)
PROT SERPL-MCNC: 6.9 G/DL (ref 6–8.5)
SODIUM SERPL-SCNC: 143 MMOL/L (ref 134–144)
T4 FREE SERPL-MCNC: 1.16 NG/DL (ref 0.82–1.77)
TRIGL SERPL-MCNC: 162 MG/DL (ref 0–149)
TSH SERPL DL<=0.005 MIU/L-ACNC: 1.44 UIU/ML (ref 0.45–4.5)
VLDLC SERPL CALC-MCNC: 32 MG/DL (ref 5–40)

## 2020-07-29 NOTE — PROGRESS NOTES
Please inform patient overall her labs are good, her A1c has increased somewhat to 6.6.  Please make sure she is continuing to take her metformin 500 mg daily, we will discuss increasing dose at upcoming appointment have her bring glucose readings with her.  Thyroid and cholesterol are at goal continue current medications.

## 2020-08-21 ENCOUNTER — OFFICE VISIT (OUTPATIENT)
Dept: FAMILY MEDICINE CLINIC | Facility: CLINIC | Age: 65
End: 2020-08-21

## 2020-08-21 VITALS
WEIGHT: 195 LBS | OXYGEN SATURATION: 98 % | BODY MASS INDEX: 34.55 KG/M2 | DIASTOLIC BLOOD PRESSURE: 67 MMHG | TEMPERATURE: 98.2 F | HEIGHT: 63 IN | SYSTOLIC BLOOD PRESSURE: 95 MMHG | HEART RATE: 92 BPM

## 2020-08-21 DIAGNOSIS — I10 ESSENTIAL HYPERTENSION: ICD-10-CM

## 2020-08-21 DIAGNOSIS — E03.9 ACQUIRED HYPOTHYROIDISM: ICD-10-CM

## 2020-08-21 DIAGNOSIS — E11.9 TYPE 2 DIABETES MELLITUS WITHOUT COMPLICATION, WITHOUT LONG-TERM CURRENT USE OF INSULIN (HCC): Primary | ICD-10-CM

## 2020-08-21 DIAGNOSIS — E78.01 FAMILIAL HYPERCHOLESTEROLEMIA: ICD-10-CM

## 2020-08-21 PROCEDURE — 99214 OFFICE O/P EST MOD 30 MIN: CPT | Performed by: FAMILY MEDICINE

## 2020-09-02 ENCOUNTER — OFFICE VISIT (OUTPATIENT)
Dept: FAMILY MEDICINE CLINIC | Facility: CLINIC | Age: 65
End: 2020-09-02

## 2020-09-02 VITALS
HEIGHT: 63 IN | WEIGHT: 190 LBS | BODY MASS INDEX: 33.66 KG/M2 | TEMPERATURE: 97.3 F | SYSTOLIC BLOOD PRESSURE: 112 MMHG | OXYGEN SATURATION: 99 % | DIASTOLIC BLOOD PRESSURE: 78 MMHG | HEART RATE: 86 BPM

## 2020-09-02 DIAGNOSIS — Z23 NEED FOR INFLUENZA VACCINATION: ICD-10-CM

## 2020-09-02 DIAGNOSIS — E11.9 TYPE 2 DIABETES MELLITUS WITHOUT COMPLICATION, WITHOUT LONG-TERM CURRENT USE OF INSULIN (HCC): ICD-10-CM

## 2020-09-02 DIAGNOSIS — E78.2 MIXED HYPERLIPIDEMIA: ICD-10-CM

## 2020-09-02 DIAGNOSIS — Z00.00 ANNUAL PHYSICAL EXAM: Primary | ICD-10-CM

## 2020-09-02 DIAGNOSIS — E66.09 CLASS 1 OBESITY DUE TO EXCESS CALORIES WITHOUT SERIOUS COMORBIDITY WITH BODY MASS INDEX (BMI) OF 33.0 TO 33.9 IN ADULT: ICD-10-CM

## 2020-09-02 DIAGNOSIS — I10 ESSENTIAL HYPERTENSION: ICD-10-CM

## 2020-09-02 PROCEDURE — 99396 PREV VISIT EST AGE 40-64: CPT | Performed by: FAMILY MEDICINE

## 2020-09-02 PROCEDURE — G0008 ADMIN INFLUENZA VIRUS VAC: HCPCS | Performed by: FAMILY MEDICINE

## 2020-09-02 PROCEDURE — 90686 IIV4 VACC NO PRSV 0.5 ML IM: CPT | Performed by: FAMILY MEDICINE

## 2020-09-02 NOTE — PROGRESS NOTES
Chief Complaint   Patient presents with   • annual wellness       Subjective     Johanna Swan is a 64 y.o. female.   Patient here for her annual wellness  Mammogram on 7/25/19  BIRADS 2 benign. States had mammogram last month at [facility], will call to get report. Ordered by Gyn Chen Pedro at [facility] office.   dexa scan 12/20/2018 T-score -0.4 dual femur  Pap smear 10/31/2007 negative. Did have Gyn exam by NP Chen Monroy at [facility] 2020, last paap in 2014. She had a hysterectomy in December 2005.   Colonoscopy 5/31/2018, Tubular adenoma on colonoscopy, Dr. Kent repeat in 5 years.     Immunizations, she is due for flu shot today, she had Zostavax September 30, 2017, there is uncertainty about her Shingrix vaccination as well as dated same day as Zostavax and the one dated 9/30/2018 cannot be confirmed through [facility] or [facility] where she believes she had the vaccine we will be attempting to get records from [facility] to see if she had it at my old practice.  Patient states she believes she is actually taking both of the Shingrix vaccines.    Diabetes, patient reports a huge improvement since started Trulicity 2 weeks ago, down about 50 points lowest 88 all in goal.Hove not notice change in appitite but scale indicates 5 # weight loss.     The following portions of the patient's history were reviewed and updated as appropriate: allergies, current medications, past family history, past medical history, past social history, past surgical history and problem list.    Current Outpatient Medications on File Prior to Visit   Medication Sig   • albuterol sulfate  (90 Base) MCG/ACT inhaler Inhale 2 puffs Every 6 (Six) Hours As Needed for Wheezing.   • AMITIZA 24 MCG capsule Take 1 capsule by mouth 2 (Two) Times a Day.   • baclofen (LIORESAL) 10 MG tablet Take 1 tablet by mouth 2 (Two) Times a Day.   • benazepril-hydrochlorthiazide (LOTENSIN HCT) 5-6.25 MG per tablet Take 1 tablet by mouth  Daily.   • dicyclomine (BENTYL) 10 MG capsule Take 1 capsule by mouth 4 (Four) Times a Day Before Meals & at Bedtime. If needed for colon spasm/cramping   • Dulaglutide (Trulicity) 0.75 MG/0.5ML solution pen-injector Inject 0.75 mg under the skin into the appropriate area as directed 1 (One) Time Per Week.   • DULoxetine (CYMBALTA) 60 MG capsule Take 1 capsule by mouth Daily.   • Erenumab-aooe (Aimovig) 140 MG/ML solution auto-injector Inject 1 mg under the skin into the appropriate area as directed Every 30 (Thirty) Days.   • esomeprazole (NEXIUM) 40 MG capsule Take 1 capsule by mouth Daily.   • glucose blood (OneTouch Verio) test strip 1 each by Other route Daily. Use as instructed Dx E11.9   • HYDROcodone-acetaminophen (NORCO) 5-325 MG per tablet Take 1 tablet by mouth 3 (Three) Times a Day As Needed.   • hydrOXYzine (ATARAX) 25 MG tablet Take 1 tablet by mouth 3 (Three) Times a Day As Needed (as needed for anxiety).   • levothyroxine (SYNTHROID, LEVOTHROID) 88 MCG tablet Take 1 tablet by mouth Daily.   • lidocaine (XYLOCAINE) 5 % ointment APPLY .5 G OF CREAM TO AFFECTED AREA NOT EXCEEDING MORE THEN 13 TUBE PER DAY.   • Lidocaine-Tetracaine 7-7 % cream APPLY TO AFFECTED AREA TWICE DAILY AS NEEDED   • LYRICA 150 MG capsule Take 2 capsules by mouth Daily.   • metFORMIN (Glucophage) 500 MG tablet Take 1 tablet by mouth Daily With Breakfast.   • mometasone (NASONEX) 50 MCG/ACT nasal spray 2 sprays into the nostril(s) as directed by provider Daily.   • MYRBETRIQ 50 MG tablet sustained-release 24 hour 24 hr tablet Take 1 tablet by mouth Daily.   • rizatriptan (MAXALT) 10 MG tablet Take 1 tablet by mouth 1 (One) Time As Needed for Migraine (May repeat in 2 hours if needed for persistent headache).   • rosuvastatin (CRESTOR) 5 MG tablet Take 1 tablet by mouth Daily.   • tamsulosin (FLOMAX) 0.4 MG capsule 24 hr capsule Take 1 capsule by mouth Daily.     No current facility-administered medications on file prior to visit.   "    There are no discontinued medications.    Review of Systems   Constitutional: Positive for unexpected weight loss. Negative for appetite change, fatigue and fever.   HENT: Positive for hearing loss ( Greatly improved with new hearing aids) and tinnitus. Negative for congestion, ear discharge and ear pain.    Eyes: Negative for visual disturbance.   Respiratory: Negative for cough, shortness of breath and wheezing.    Cardiovascular: Negative for chest pain, palpitations and leg swelling.   Gastrointestinal: Positive for abdominal pain ( Reports this is chronic, no worse today.). Negative for constipation, diarrhea, nausea, vomiting and indigestion.   Musculoskeletal: Positive for arthralgias, back pain and myalgias.   Skin: Negative for rash.   Neurological: Positive for headache. Negative for numbness.   Psychiatric/Behavioral: Negative for sleep disturbance and depressed mood.        Objective   Vitals:    09/02/20 0912   BP: 112/78   BP Location: Right arm   Patient Position: Sitting   Cuff Size: Adult   Pulse: 86   Temp: 97.3 °F (36.3 °C)   TempSrc: Infrared   SpO2: 99%   Weight: 86.2 kg (190 lb)   Height: 160 cm (63\")      Body mass index is 33.66 kg/m².    Physical Exam   Constitutional: She is oriented to person, place, and time. She appears well-developed and well-nourished.   HENT:   Head: Normocephalic and atraumatic.   Nose: Nose normal.   Mouth/Throat: Oropharynx is clear and moist. No oropharyngeal exudate.   Wearing hearing aids, removed for exam.  External auditory canals and tympanic membranes normal bilaterally no significant wax.   Eyes: Pupils are equal, round, and reactive to light. Conjunctivae and EOM are normal.   Neck: Normal range of motion. Neck supple. No JVD present. No thyromegaly present.   No carotid bruit   Cardiovascular: Normal rate, regular rhythm and normal heart sounds.   No murmur heard.  Pulmonary/Chest: Breath sounds normal. She has no wheezes. She has no rales. "   Abdominal: Soft. She exhibits no mass. There is tenderness ( Diffuse). There is no rebound and no guarding. No hernia.   Obese   Musculoskeletal: Normal range of motion. She exhibits no edema.   Lymphadenopathy:     She has no cervical adenopathy.   Neurological: She is alert and oriented to person, place, and time.   Skin: Skin is warm and dry. No rash noted.   Psychiatric: She has a normal mood and affect.       Lab Results   Component Value Date     (H) 07/27/2020    BUN 19 07/27/2020    CREATININE 0.82 07/27/2020    EGFRIFNONA 76 07/27/2020    EGFRIFAFRI 88 07/27/2020     07/27/2020    K 4.3 07/27/2020     07/27/2020    CALCIUM 9.3 07/27/2020    ALBUMIN 4.2 07/27/2020    BILITOT 0.8 07/27/2020    ALKPHOS 87 07/27/2020    AST 11 07/27/2020    ALT 10 07/27/2020    CHLPL 178 07/27/2020    TRIG 162 (H) 07/27/2020    HDL 56 07/27/2020    VLDL 32 07/27/2020    LDL 90 07/27/2020    TSH 1.440 07/27/2020    FREET4 1.16 07/27/2020      Lab Results   Component Value Date    HGBA1C 6.6 (H) 07/27/2020    HGBA1C 6.5 (H) 01/17/2020    HGBA1C 6.3 (H) 10/11/2019        Procedures     Assessment/Plan   Diagnoses and all orders for this visit:    1. Annual physical exam (Primary)    2. Class 1 obesity due to excess calories without serious comorbidity with body mass index (BMI) of 33.0 to 33.9 in adult    3. Type 2 diabetes mellitus without complication, without long-term current use of insulin (CMS/[facility])    4. Essential hypertension    5. Mixed hyperlipidemia          There are no discontinued medications.       Return in about 2 months (around 11/2/2020) for Recheck, diabetes, htn, cholesterol, and thyroid with labs prior..        AVS given with information on preventative health measures.  Did encourage continued weight reduction for overall general improvement in health including better control of diabetes, cholesterol, and hypertension.

## 2020-09-11 ENCOUNTER — TELEPHONE (OUTPATIENT)
Dept: FAMILY MEDICINE CLINIC | Facility: CLINIC | Age: 65
End: 2020-09-11

## 2020-09-11 NOTE — TELEPHONE ENCOUNTER
Patient called concerned about her Trulicity, She said she heard or read somewhere that people that have a family history or have Thyroid cancer should not be taking Trulicity- Her father she states had thyroid cancer twice. She is wanting to know the risks of taking this medication and if she is able to get tested to check if she has the gene. Wants to know what  thoughts are on this situation, she doesn't really want to stop taking this because it has helped her Sugar said it took her readings down 20-30 numbers. Please advise

## 2020-09-12 NOTE — TELEPHONE ENCOUNTER
This is true if there is a family history of medullary thyroid cancer, not for all types of thyroid cancer.  Medullary cancer accounts for only 4% of thyroid cancers.  Do you know what type of cancer your father had, or how we can get this information?

## 2020-09-15 NOTE — TELEPHONE ENCOUNTER
Spoke with patient and she is unsure what type of cancer father had and not sure how to find out due to his Doctor passed away, but she will continue to look in to it and for now she will continue to take the Trulicity

## 2020-09-22 RX ORDER — BENAZEPRIL HYDROCHLORIDE AND HYDROCHLOROTHIAZIDE 5; 6.25 MG/1; MG/1
1 TABLET ORAL DAILY
Qty: 90 TABLET | Refills: 1 | Status: SHIPPED | OUTPATIENT
Start: 2020-09-22 | End: 2020-11-17 | Stop reason: ALTCHOICE

## 2020-11-04 DIAGNOSIS — M54.2 CERVICALGIA: Primary | ICD-10-CM

## 2020-11-04 DIAGNOSIS — G89.29 CHRONIC BILATERAL LOW BACK PAIN WITH BILATERAL SCIATICA: Chronic | ICD-10-CM

## 2020-11-04 DIAGNOSIS — M54.41 CHRONIC BILATERAL LOW BACK PAIN WITH BILATERAL SCIATICA: Chronic | ICD-10-CM

## 2020-11-04 DIAGNOSIS — M54.42 CHRONIC BILATERAL LOW BACK PAIN WITH BILATERAL SCIATICA: Chronic | ICD-10-CM

## 2020-11-16 ENCOUNTER — TELEPHONE (OUTPATIENT)
Dept: FAMILY MEDICINE CLINIC | Facility: CLINIC | Age: 65
End: 2020-11-16

## 2020-11-19 ENCOUNTER — RESULTS ENCOUNTER (OUTPATIENT)
Dept: FAMILY MEDICINE CLINIC | Facility: CLINIC | Age: 65
End: 2020-11-19

## 2020-11-19 DIAGNOSIS — E03.9 ACQUIRED HYPOTHYROIDISM: ICD-10-CM

## 2020-11-19 DIAGNOSIS — I10 ESSENTIAL HYPERTENSION: ICD-10-CM

## 2020-11-19 DIAGNOSIS — E11.9 TYPE 2 DIABETES MELLITUS WITHOUT COMPLICATION, WITHOUT LONG-TERM CURRENT USE OF INSULIN (HCC): ICD-10-CM

## 2020-11-19 DIAGNOSIS — E78.01 FAMILIAL HYPERCHOLESTEROLEMIA: ICD-10-CM

## 2020-11-30 PROCEDURE — U0003 INFECTIOUS AGENT DETECTION BY NUCLEIC ACID (DNA OR RNA); SEVERE ACUTE RESPIRATORY SYNDROME CORONAVIRUS 2 (SARS-COV-2) (CORONAVIRUS DISEASE [COVID-19]), AMPLIFIED PROBE TECHNIQUE, MAKING USE OF HIGH THROUGHPUT TECHNOLOGIES AS DESCRIBED BY CMS-2020-01-R: HCPCS | Performed by: FAMILY MEDICINE

## 2020-12-08 ENCOUNTER — TELEPHONE (OUTPATIENT)
Dept: FAMILY MEDICINE CLINIC | Facility: CLINIC | Age: 65
End: 2020-12-08

## 2020-12-08 DIAGNOSIS — I10 ESSENTIAL HYPERTENSION: ICD-10-CM

## 2020-12-08 RX ORDER — BENAZEPRIL HYDROCHLORIDE 10 MG/1
10 TABLET ORAL DAILY
Qty: 90 TABLET | Refills: 0 | Status: SHIPPED | OUTPATIENT
Start: 2020-12-08 | End: 2021-05-10 | Stop reason: SDUPTHER

## 2020-12-08 NOTE — TELEPHONE ENCOUNTER
Please inform patient to increase benazepril from 5 mg to 10 mg daily, I have sent in benazepril 10 mg.  If blood pressure remaining greater than 140/90 in 1 week contact office and we may increase further, otherwise keep appointment as scheduled on January 4.

## 2020-12-08 NOTE — TELEPHONE ENCOUNTER
Patient called stating that her blood pressure medication benazepril was recently changed to a lower dose. She is now experiencing elevated blood pressure (140/90) (135/89) and she is very irritable. Patient is wanting to know if blood pressure medication should be changed again. Please advise patient.

## 2020-12-09 DIAGNOSIS — E03.9 ACQUIRED HYPOTHYROIDISM: ICD-10-CM

## 2020-12-09 RX ORDER — LEVOTHYROXINE SODIUM 88 MCG
TABLET ORAL
Qty: 90 TABLET | Refills: 1 | Status: SHIPPED | OUTPATIENT
Start: 2020-12-09 | End: 2021-06-07

## 2020-12-10 LAB
ALBUMIN SERPL-MCNC: 4.3 G/DL (ref 3.8–4.8)
ALBUMIN/GLOB SERPL: 1.5 {RATIO} (ref 1.2–2.2)
ALP SERPL-CCNC: 79 IU/L (ref 39–117)
ALT SERPL-CCNC: 12 IU/L (ref 0–32)
AST SERPL-CCNC: 13 IU/L (ref 0–40)
BILIRUB SERPL-MCNC: 0.9 MG/DL (ref 0–1.2)
BUN SERPL-MCNC: 14 MG/DL (ref 8–27)
BUN/CREAT SERPL: 17 (ref 12–28)
CALCIUM SERPL-MCNC: 9.9 MG/DL (ref 8.7–10.3)
CHLORIDE SERPL-SCNC: 103 MMOL/L (ref 96–106)
CHOLEST SERPL-MCNC: 248 MG/DL (ref 100–199)
CO2 SERPL-SCNC: 24 MMOL/L (ref 20–29)
CREAT SERPL-MCNC: 0.83 MG/DL (ref 0.57–1)
GLOBULIN SER CALC-MCNC: 2.9 G/DL (ref 1.5–4.5)
GLUCOSE SERPL-MCNC: 93 MG/DL (ref 65–99)
HBA1C MFR BLD: 6.1 % (ref 4.8–5.6)
HDLC SERPL-MCNC: 57 MG/DL
LDLC SERPL CALC-MCNC: 156 MG/DL (ref 0–99)
POTASSIUM SERPL-SCNC: 4.7 MMOL/L (ref 3.5–5.2)
PROT SERPL-MCNC: 7.2 G/DL (ref 6–8.5)
SODIUM SERPL-SCNC: 141 MMOL/L (ref 134–144)
T4 FREE SERPL-MCNC: 1.39 NG/DL (ref 0.82–1.77)
TRIGL SERPL-MCNC: 192 MG/DL (ref 0–149)
TSH SERPL DL<=0.005 MIU/L-ACNC: 1.09 UIU/ML (ref 0.45–4.5)
VLDLC SERPL CALC-MCNC: 35 MG/DL (ref 5–40)

## 2020-12-11 DIAGNOSIS — E11.9 TYPE 2 DIABETES MELLITUS WITHOUT COMPLICATION, WITH LONG-TERM CURRENT USE OF INSULIN (HCC): ICD-10-CM

## 2020-12-11 DIAGNOSIS — E11.9 TYPE 2 DIABETES MELLITUS WITHOUT COMPLICATION, WITHOUT LONG-TERM CURRENT USE OF INSULIN (HCC): ICD-10-CM

## 2020-12-11 DIAGNOSIS — Z79.4 TYPE 2 DIABETES MELLITUS WITHOUT COMPLICATION, WITH LONG-TERM CURRENT USE OF INSULIN (HCC): ICD-10-CM

## 2020-12-11 RX ORDER — DULAGLUTIDE 0.75 MG/.5ML
1 INJECTION, SOLUTION SUBCUTANEOUS WEEKLY
Qty: 4 PEN | Refills: 12 | Status: SHIPPED | OUTPATIENT
Start: 2020-12-11 | End: 2021-10-26

## 2020-12-11 RX ORDER — BLOOD SUGAR DIAGNOSTIC
1 STRIP MISCELLANEOUS DAILY
Qty: 100 EACH | Refills: 3 | Status: SHIPPED | OUTPATIENT
Start: 2020-12-11 | End: 2022-01-04

## 2021-01-20 DIAGNOSIS — M79.671 RIGHT FOOT PAIN: Primary | ICD-10-CM

## 2021-01-20 DIAGNOSIS — M47.12 CERVICAL SPONDYLOSIS WITH MYELOPATHY: ICD-10-CM

## 2021-01-20 DIAGNOSIS — M50.30 DEGENERATIVE DISC DISEASE, CERVICAL: ICD-10-CM

## 2021-01-20 DIAGNOSIS — M47.16 LUMBAR SPONDYLOSIS WITH MYELOPATHY: ICD-10-CM

## 2021-01-20 DIAGNOSIS — M50.20 HERNIATED CERVICAL DISC: ICD-10-CM

## 2021-01-20 DIAGNOSIS — M48.02 SPINAL STENOSIS OF CERVICAL REGION: ICD-10-CM

## 2021-01-20 DIAGNOSIS — M48.04 SPINAL STENOSIS OF THORACIC REGION: ICD-10-CM

## 2021-01-20 DIAGNOSIS — M51.37 DEGENERATION OF LUMBAR OR LUMBOSACRAL INTERVERTEBRAL DISC: ICD-10-CM

## 2021-01-27 ENCOUNTER — OFFICE VISIT (OUTPATIENT)
Dept: FAMILY MEDICINE CLINIC | Facility: CLINIC | Age: 66
End: 2021-01-27

## 2021-01-27 VITALS
WEIGHT: 181 LBS | TEMPERATURE: 97.1 F | OXYGEN SATURATION: 99 % | SYSTOLIC BLOOD PRESSURE: 117 MMHG | DIASTOLIC BLOOD PRESSURE: 78 MMHG | HEIGHT: 63 IN | BODY MASS INDEX: 32.07 KG/M2 | HEART RATE: 95 BPM

## 2021-01-27 DIAGNOSIS — M48.062 SPINAL STENOSIS OF LUMBAR REGION WITH NEUROGENIC CLAUDICATION: ICD-10-CM

## 2021-01-27 DIAGNOSIS — E11.9 TYPE 2 DIABETES MELLITUS WITHOUT COMPLICATION, WITHOUT LONG-TERM CURRENT USE OF INSULIN (HCC): Primary | ICD-10-CM

## 2021-01-27 DIAGNOSIS — E78.2 MIXED HYPERLIPIDEMIA: ICD-10-CM

## 2021-01-27 DIAGNOSIS — I10 ESSENTIAL HYPERTENSION: ICD-10-CM

## 2021-01-27 DIAGNOSIS — E03.9 ACQUIRED HYPOTHYROIDISM: ICD-10-CM

## 2021-01-27 DIAGNOSIS — E66.09 CLASS 1 OBESITY DUE TO EXCESS CALORIES WITHOUT SERIOUS COMORBIDITY WITH BODY MASS INDEX (BMI) OF 32.0 TO 32.9 IN ADULT: ICD-10-CM

## 2021-01-27 PROCEDURE — 99214 OFFICE O/P EST MOD 30 MIN: CPT | Performed by: FAMILY MEDICINE

## 2021-01-27 RX ORDER — LUBIPROSTONE 24 UG/1
24 CAPSULE, GELATIN COATED ORAL 2 TIMES DAILY
Refills: 3
Start: 2021-01-27

## 2021-01-27 NOTE — PROGRESS NOTES
"Chief Complaint   Patient presents with   • Diabetes   • Hypertension   • Hyperlipidemia   • Hypothyroidism       Subjective     Johanna Swan is a 65 y.o. female. Patient here for follow up on diabetes, HTN, cholesterol and thyroid. Patient has stopped taking all her pain medications: Hydrocodone, baclofen, and Lyrica, as well as multiple other medications including Flomax, Metformin, Myrbetriq and amitiza.  Report significant incrase in pain.  Mostly in her low back radiating into her leg.  [facility] Have not [facility] Dr Elena or [facility] to discuss medications she stopped.   Had Covid in November skipped medications for 3 days while sick. Decided not to restart due to chronic constipation. Constipation has improved somewhat but still an issue, not easy to go.  But no longer having to \" squeeze out with her hands\".  Restarted Crestor, Aimovig, trulicity in past 2 weeks.   Reporting a hard time getting her urine flow started at times, denies any incontinence.  Patient states to check sugars at home, no readings with her, states it runs around 120, does not check her B/P.  The following portions of the patient's history were reviewed and updated as appropriate: allergies, current medications, past family history, past medical history, past social history, past surgical history and problem list.    Current Outpatient Medications on File Prior to Visit   Medication Sig   • albuterol sulfate  (90 Base) MCG/ACT inhaler Inhale 2 puffs Every 6 (Six) Hours As Needed for Wheezing.   • benazepril (LOTENSIN) 10 MG tablet Take 1 tablet by mouth Daily.   • dicyclomine (BENTYL) 10 MG capsule Take 1 capsule by mouth 4 (Four) Times a Day Before Meals & at Bedtime. If needed for colon spasm/cramping   • Dulaglutide (Trulicity) 0.75 MG/0.5ML solution pen-injector Inject 0.75 mg under the skin into the appropriate area as directed 1 (One) Time Per Week.   • Erenumab-aooe " (Aimovig) 140 MG/ML solution auto-injector Inject 1 mg under the skin into the appropriate area as directed Every 30 (Thirty) Days.   • esomeprazole (NEXIUM) 40 MG capsule Take 1 capsule by mouth Daily.   • glucose blood (OneTouch Verio) test strip 1 each by Other route Daily. Use as instructed Dx E11.9   • lidocaine (XYLOCAINE) 5 % ointment APPLY .5 G OF CREAM TO AFFECTED AREA NOT EXCEEDING MORE THEN 13 TUBE PER DAY.   • Lidocaine-Tetracaine 7-7 % cream APPLY TO AFFECTED AREA TWICE DAILY AS NEEDED   • mometasone (NASONEX) 50 MCG/ACT nasal spray 2 sprays into the nostril(s) as directed by provider Daily.   • ondansetron ODT (ZOFRAN-ODT) 4 MG disintegrating tablet DISSOLVE 1 TABLET IN MOUTH THREE TIMES DAILY FOR 7 DAYS AS NEEDED FOR NAUSEA AND VOMITING   • rizatriptan (MAXALT) 10 MG tablet Take 1 tablet by mouth 1 (One) Time As Needed for Migraine (May repeat in 2 hours if needed for persistent headache).   • rosuvastatin (CRESTOR) 5 MG tablet Take 1 tablet by mouth Daily.   • Synthroid 88 MCG tablet TAKE 1 TABLET DAILY   • DULoxetine (CYMBALTA) 60 MG capsule Take 1 capsule by mouth Daily.   • tamsulosin (FLOMAX) 0.4 MG capsule 24 hr capsule Take 1 capsule by mouth Daily.   • [DISCONTINUED] AMITIZA 24 MCG capsule Take 1 capsule by mouth 2 (Two) Times a Day.   • [DISCONTINUED] baclofen (LIORESAL) 10 MG tablet Take 1 tablet by mouth 2 (Two) Times a Day.   • [DISCONTINUED] HYDROcodone-acetaminophen (NORCO) 5-325 MG per tablet Take 1 tablet by mouth 3 (Three) Times a Day As Needed.   • [DISCONTINUED] hydrOXYzine (ATARAX) 25 MG tablet Take 1 tablet by mouth 3 (Three) Times a Day As Needed (as needed for anxiety).   • [DISCONTINUED] LYRICA 150 MG capsule Take 2 capsules by mouth Daily.   • [DISCONTINUED] metFORMIN (Glucophage) 500 MG tablet Take 1 tablet by mouth Daily With Breakfast.   • [DISCONTINUED] MYRBETRIQ 50 MG tablet sustained-release 24 hour 24 hr tablet Take 1 tablet by mouth Daily.     No current  "facility-administered medications on file prior to visit.      Medications Discontinued During This Encounter   Medication Reason   • metFORMIN (Glucophage) 500 MG tablet Other- See Medication Note   • HYDROcodone-acetaminophen (NORCO) 5-325 MG per tablet Side effects   • baclofen (LIORESAL) 10 MG tablet Other- See Medication Note   • LYRICA 150 MG capsule Other- See Medication Note   • MYRBETRIQ 50 MG tablet sustained-release 24 hour 24 hr tablet Other- See Medication Note   • AMITIZA 24 MCG capsule Reorder   • hydrOXYzine (ATARAX) 25 MG tablet Other- See Medication Note       Review of Systems     Objective   Vitals:    01/27/21 1330   BP: 117/78   BP Location: Left arm   Patient Position: Sitting   Cuff Size: Adult   Pulse: 95   Temp: 97.1 °F (36.2 °C)   TempSrc: Infrared   SpO2: 99%   Weight: 82.1 kg (181 lb)   Height: 160 cm (63\")      Body mass index is 32.06 kg/m².    Physical Exam    Lab Results   Component Value Date    GLU 93 12/09/2020    BUN 14 12/09/2020    CREATININE 0.83 12/09/2020    EGFRIFNONA 75 12/09/2020    EGFRIFAFRI 87 12/09/2020     12/09/2020    K 4.7 12/09/2020     12/09/2020    CALCIUM 9.9 12/09/2020    ALBUMIN 4.3 12/09/2020    BILITOT 0.9 12/09/2020    ALKPHOS 79 12/09/2020    AST 13 12/09/2020    ALT 12 12/09/2020    CHLPL 248 (H) 12/09/2020    TRIG 192 (H) 12/09/2020    HDL 57 12/09/2020    VLDL 35 12/09/2020     (H) 12/09/2020    TSH 1.090 12/09/2020    FREET4 1.39 12/09/2020      Lab Results   Component Value Date    HGBA1C 6.1 (H) 12/09/2020    HGBA1C 6.6 (H) 07/27/2020    HGBA1C 6.5 (H) 01/17/2020    HGBA1C 6.3 (H) 10/11/2019        Procedures     Assessment/Plan   Diagnoses and all orders for this visit:    1. Type 2 diabetes mellitus without complication, without long-term current use of insulin (CMS/[facility]) (Primary)    2. Essential hypertension    3. Mixed hyperlipidemia    4. Acquired hypothyroidism    5. Class 1 obesity due to excess calories without serious " comorbidity with body mass index (BMI) of 32.0 to 32.9 in adult    6. Spinal stenosis of lumbar region with neurogenic claudication    Other orders  -     Amitiza 24 MCG capsule; Take 1 capsule by mouth 2 (Two) Times a Day.; Refill: 3    Long discussion about the medication she has previously taken medications might help her the most.  Restarting Cymbalta to help with the pain and depression.  Continue Synthroid and Crestor.  Do not recommend restarting hydrocodone, Lyrica, or baclofen as constipation is a potential side effect of these medications.  I did recommend she restart Flomax due to urinary hesitancy but not Myrbetriq as she is not having incontinence issues.  Additionally her she has had Zostavax in 2017 and needed of the Shingrix vaccination, currently up-to-date appropriately.  At first requests to have Shingrix today because she declined.  Advised to schedule appointments with specialists as mentioned above.    Medications Discontinued During This Encounter   Medication Reason   • metFORMIN (Glucophage) 500 MG tablet Other- See Medication Note   • HYDROcodone-acetaminophen (NORCO) 5-325 MG per tablet Side effects   • baclofen (LIORESAL) 10 MG tablet Other- See Medication Note   • LYRICA 150 MG capsule Other- See Medication Note   • MYRBETRIQ 50 MG tablet sustained-release 24 hour 24 hr tablet Other- See Medication Note   • AMITIZA 24 MCG capsule Reorder   • hydrOXYzine (ATARAX) 25 MG tablet Other- See Medication Note          Return in about 3 months (around 4/27/2021) for Recheck, diabetes, with Labs.    Education reference material relevant to visit available in AVS through [employer] or printed copy given.

## 2021-03-19 ENCOUNTER — TREATMENT (OUTPATIENT)
Dept: PHYSICAL THERAPY | Facility: CLINIC | Age: 66
End: 2021-03-19

## 2021-03-19 DIAGNOSIS — M54.50 CHRONIC LOW BACK PAIN, UNSPECIFIED BACK PAIN LATERALITY, UNSPECIFIED WHETHER SCIATICA PRESENT: Primary | ICD-10-CM

## 2021-03-19 DIAGNOSIS — G89.29 CHRONIC LOW BACK PAIN, UNSPECIFIED BACK PAIN LATERALITY, UNSPECIFIED WHETHER SCIATICA PRESENT: Primary | ICD-10-CM

## 2021-03-19 DIAGNOSIS — M54.2 CERVICALGIA: ICD-10-CM

## 2021-03-19 PROCEDURE — 97163 PT EVAL HIGH COMPLEX 45 MIN: CPT | Performed by: PHYSICAL THERAPIST

## 2021-03-19 PROCEDURE — G0283 ELEC STIM OTHER THAN WOUND: HCPCS | Performed by: PHYSICAL THERAPIST
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
## 2021-04-02 ENCOUNTER — TREATMENT (OUTPATIENT)
Dept: PHYSICAL THERAPY | Facility: CLINIC | Age: 66
End: 2021-04-02

## 2021-04-02 DIAGNOSIS — M54.50 CHRONIC LOW BACK PAIN, UNSPECIFIED BACK PAIN LATERALITY, UNSPECIFIED WHETHER SCIATICA PRESENT: Primary | ICD-10-CM

## 2021-04-02 DIAGNOSIS — M54.2 CERVICALGIA: ICD-10-CM

## 2021-04-02 DIAGNOSIS — G89.29 CHRONIC LOW BACK PAIN, UNSPECIFIED BACK PAIN LATERALITY, UNSPECIFIED WHETHER SCIATICA PRESENT: Primary | ICD-10-CM

## 2021-04-02 PROCEDURE — 97113 AQUATIC THERAPY/EXERCISES: CPT | Performed by: PHYSICAL THERAPIST

## 2021-04-02 NOTE — PROGRESS NOTES
Physical Therapy Daily Progress Note  VISIT#: 2 POC 20 only eval + 7 approved for now till 5/20/21    Subjective   Johanna Swan reports: came in with 5/10 all over pain but mainly LBP.      Objective     See Exercise, Manual, and Modality Logs for complete treatment.   Please note: wears hearing aids  Patient Education: pool program, frequent noodle suspension brakes.  Stating EIS made back feel better.    Today in with steps/out with chair.    Likes to take shower after, in Bathroom.       Assessment/Plan  Felt better while she was in the pool.  Liked the pool and all she was able to do in the pool.     Progress per Plan of Care and Progress strengthening /stabilization /functional activity    As tolerated        Timed:         Manual Therapy:         mins  56808;     Therapeutic Exercise:         mins  67775;     Neuromuscular Torie:        mins  42513;    Therapeutic Activity:          mins  60287;     Gait Training:          mins  98272;     Ultrasound:          mins  95626;    Ionto                                   mins   89433  Self Care                            mins   79451  Canalith Repos                   mins  4209  Aquatic                        45       mins 56907    Un-Timed:  Electrical Stimulation:         mins  35213 ( );  Dry Needling          mins self-pay  Traction          mins 52525    Timed Treatment:  45    mins   Total Treatment:    45    mins    Pau Ruvalcaba PT  [place] License # [id number]  Physical Therapist

## 2021-04-06 ENCOUNTER — OFFICE VISIT (OUTPATIENT)
Dept: FAMILY MEDICINE CLINIC | Facility: CLINIC | Age: 66
End: 2021-04-06

## 2021-04-06 VITALS
SYSTOLIC BLOOD PRESSURE: 128 MMHG | OXYGEN SATURATION: 98 % | WEIGHT: 182 LBS | HEIGHT: 63 IN | DIASTOLIC BLOOD PRESSURE: 84 MMHG | TEMPERATURE: 97.3 F | RESPIRATION RATE: 16 BRPM | HEART RATE: 83 BPM | BODY MASS INDEX: 32.25 KG/M2

## 2021-04-06 DIAGNOSIS — M48.062 SPINAL STENOSIS OF LUMBAR REGION WITH NEUROGENIC CLAUDICATION: ICD-10-CM

## 2021-04-06 DIAGNOSIS — R21 RASH AND NONSPECIFIC SKIN ERUPTION: ICD-10-CM

## 2021-04-06 DIAGNOSIS — M50.30 DEGENERATIVE DISC DISEASE, CERVICAL: ICD-10-CM

## 2021-04-06 DIAGNOSIS — M51.37 DEGENERATION OF LUMBAR OR LUMBOSACRAL INTERVERTEBRAL DISC: Primary | ICD-10-CM

## 2021-04-06 DIAGNOSIS — G95.0: ICD-10-CM

## 2021-04-06 DIAGNOSIS — M48.04 SPINAL STENOSIS OF THORACIC REGION: ICD-10-CM

## 2021-04-06 PROCEDURE — 99214 OFFICE O/P EST MOD 30 MIN: CPT | Performed by: FAMILY MEDICINE

## 2021-04-06 RX ORDER — TIZANIDINE 4 MG/1
4 TABLET ORAL NIGHTLY
COMMUNITY
Start: 2021-03-11

## 2021-04-06 RX ORDER — HYDROCODONE BITARTRATE AND ACETAMINOPHEN 5; 325 MG/1; MG/1
1 TABLET ORAL 2 TIMES DAILY PRN
COMMUNITY
Start: 2021-03-11

## 2021-04-06 RX ORDER — MOMETASONE FUROATE 1 MG/G
CREAM TOPICAL
Qty: 50 G | Refills: 1 | Status: SHIPPED | OUTPATIENT
Start: 2021-04-06

## 2021-04-06 NOTE — PROGRESS NOTES
Chief Complaint  Back Problem    Subjective          History of Present Illness  Johanna Swan presents to [facility] to have forms filled out for her insurance company. She states Dr Scott her old pain management physician had previously completed the forms, states her new pain management physician, Dr. Gr, refused to fill them out,  they state that her primary care needs to.  She states she is scheduled for an epidural on April 16.  She is also currently doing physical therapy including aqua therapy with therapist Pau Ruvalcaba She reports [address] has her only waiver, covering her insurance premiums until she is approved for disability.  She has prescriptions for hydrocodone and Zanaflex through pain management.    Pt also reports a rash on her back is red and very itchy.  Started yesterday after having worn a silk shirt for Easter and noticing increased sweating.  Used old rx for Mometasone 0.1 % this am, helped some. Would like a refill.  Have had similar rash recur on the left upper anterior chest but is not present at this time.    Current Outpatient Medications on File Prior to Visit   Medication Sig   • albuterol sulfate  (90 Base) MCG/ACT inhaler Inhale 2 puffs Every 6 (Six) Hours As Needed for Wheezing.   • Amitiza 24 MCG capsule Take 1 capsule by mouth 2 (Two) Times a Day.   • benazepril (LOTENSIN) 10 MG tablet Take 1 tablet by mouth Daily.   • dicyclomine (BENTYL) 10 MG capsule Take 1 capsule by mouth 4 (Four) Times a Day Before Meals & at Bedtime. If needed for colon spasm/cramping   • Dulaglutide (Trulicity) 0.75 MG/0.5ML solution pen-injector Inject 0.75 mg under the skin into the appropriate area as directed 1 (One) Time Per Week.   • DULoxetine (CYMBALTA) 60 MG capsule Take 1 capsule by mouth Daily.   • Erenumab-aooe (Aimovig) 140 MG/ML solution auto-injector Inject 1 mg under the skin into the appropriate area as directed Every 30 (Thirty) Days.   •  "esomeprazole (NEXIUM) 40 MG capsule Take 1 capsule by mouth Daily.   • glucose blood (OneTouch Verio) test strip 1 each by Other route Daily. Use as instructed Dx E11.9   • HYDROcodone-acetaminophen (NORCO) 5-325 MG per tablet Take 1 tablet by mouth 2 (Two) Times a Day As Needed.   • lidocaine (XYLOCAINE) 5 % ointment APPLY .5 G OF CREAM TO AFFECTED AREA NOT EXCEEDING MORE THEN 13 TUBE PER DAY.   • Lidocaine-Tetracaine 7-7 % cream APPLY TO AFFECTED AREA TWICE DAILY AS NEEDED   • Lyrica 75 MG capsule Take 75 mg by mouth 2 (Two) Times a Day.   • mometasone (NASONEX) 50 MCG/ACT nasal spray 2 sprays into the nostril(s) as directed by provider Daily.   • rizatriptan (MAXALT) 10 MG tablet Take 1 tablet by mouth 1 (One) Time As Needed for Migraine (May repeat in 2 hours if needed for persistent headache).   • rosuvastatin (CRESTOR) 5 MG tablet Take 1 tablet by mouth Daily.   • Synthroid 88 MCG tablet TAKE 1 TABLET DAILY   • tamsulosin (FLOMAX) 0.4 MG capsule 24 hr capsule Take 1 capsule by mouth Daily.   • tiZANidine (ZANAFLEX) 4 MG tablet Take 4 mg by mouth Every Night.   • [DISCONTINUED] ondansetron ODT (ZOFRAN-ODT) 4 MG disintegrating tablet DISSOLVE 1 TABLET IN MOUTH THREE TIMES DAILY FOR 7 DAYS AS NEEDED FOR NAUSEA AND VOMITING     No current facility-administered medications on file prior to visit.       Objective   Vital Signs:   /84 (BP Location: Left arm, Patient Position: Sitting, Cuff Size: Adult)   Pulse 83   Temp 97.3 °F (36.3 °C) (Infrared)   Resp 16   Ht 160 cm (63\")   Wt 82.6 kg (182 lb)   SpO2 98%   BMI 32.24 kg/m²     Physical Exam  Vitals and nursing note reviewed.   Constitutional:       General: She is not in acute distress.     Appearance: She is well-developed.   HENT:      Head: Normocephalic and atraumatic.   Cardiovascular:      Rate and Rhythm: Normal rate and regular rhythm.      Heart sounds: No murmur heard.     Pulmonary:      Effort: Pulmonary effort is normal.      Breath " sounds: Normal breath sounds. No wheezing.   Musculoskeletal:         General: Normal range of motion.      Comments: Very limited range of motion of, cervical, thoracic, and lumbar spine.  Difficulty standing from sitting position and significantly antalgic gait.   Skin:     General: Skin is warm and dry.      Findings: No rash.      Comments: Red papular rash right of center on back below shoulder blade to approximately waistline   Neurological:      Mental Status: She is alert and oriented to person, place, and time.                Lab Results   Component Value Date    HGBA1C 6.1 (H) 12/09/2020    HGBA1C 6.6 (H) 07/27/2020    HGBA1C 6.5 (H) 01/17/2020    HGBA1C 6.3 (H) 10/11/2019       Result Review :                       Assessment and Plan    Diagnoses and all orders for this visit:    1. Degeneration of lumbar or lumbosacral intervertebral disc (Primary)    2. Degenerative disc disease, cervical    3. Spinal stenosis of thoracic region    4. Syringomyelia, acquired (CMS/HCC)    5. Spinal stenosis of lumbar region with neurogenic claudication    6. Rash and nonspecific skin eruption  -     mometasone (Elocon) 0.1 % cream; Apply to rash twice daily as needed  Dispense: 50 g; Refill: 1      Have completed paperwork for [employer] insurance for functional limitations due to severe cervical and lumbar degenerative disc disease with spinal stenosis and neurogenic claudication.  Please see scanned record for details.    Rash, likely due to heat, refilling mometasone to use up to twice daily as needed, advised to contact office if it persists longer than 10 days for further evaluation.    Medications Discontinued During This Encounter   Medication Reason   • ondansetron ODT (ZOFRAN-ODT) 4 MG disintegrating tablet *Therapy completed         Follow Up     No follow-ups on file.    Patient was given instructions and counseling regarding her condition or for health maintenance advice. Please see specific information  pulled into the AVS if appropriate.

## 2021-04-09 ENCOUNTER — TREATMENT (OUTPATIENT)
Dept: PHYSICAL THERAPY | Facility: CLINIC | Age: 66
End: 2021-04-09

## 2021-04-09 DIAGNOSIS — M54.50 CHRONIC LOW BACK PAIN, UNSPECIFIED BACK PAIN LATERALITY, UNSPECIFIED WHETHER SCIATICA PRESENT: Primary | ICD-10-CM

## 2021-04-09 DIAGNOSIS — G89.29 CHRONIC LOW BACK PAIN, UNSPECIFIED BACK PAIN LATERALITY, UNSPECIFIED WHETHER SCIATICA PRESENT: Primary | ICD-10-CM

## 2021-04-09 PROCEDURE — 97113 AQUATIC THERAPY/EXERCISES: CPT | Performed by: PHYSICAL THERAPIST

## 2021-04-09 NOTE — PROGRESS NOTES
Physical Therapy Daily Progress Note  VISIT#: 3 POC 20 only eval + 7 approved for now till 5/20/21    Subjective   [facility] reports:  Better day so far, came in with 4/10 all over pain but mainly LBP.      Objective     See Exercise, Manual, and Modality Logs for complete treatment.   Please note: wears hearing aids  Patient Education: pool program, frequent noodle suspension brakes.    Today in with steps /out with chair.  Also used neck collar neck was bothersome during session.    Likes to take shower after, in Bathroom.       Assessment/Plan  Felt better while she was in the pool.  Liked the pool and all she was able to do in the pool.     Progress per Plan of Care and Progress strengthening /stabilization /functional activity    As tolerated        Timed:         Manual Therapy:         mins  71521;     Therapeutic Exercise:         mins  72062;     Neuromuscular Torie:        mins  48558;    Therapeutic Activity:          mins  19281;     Gait Training:          mins  41977;     Ultrasound:          mins  99013;    Ionto                                   mins   33175  Self Care                            mins   56306  Canalith Repos                   mins  4209  Aquatic                        45       mins 63422    Un-Timed:  Electrical Stimulation:         mins  28104 ( );  Dry Needling          mins self-pay  Traction          mins 26214    Timed Treatment:  45    mins   Total Treatment:    45    mins    Pau Ruvalcaba PT  [place] License # [id number]  Physical Therapist

## 2021-04-12 ENCOUNTER — TREATMENT (OUTPATIENT)
Dept: PHYSICAL THERAPY | Facility: CLINIC | Age: 66
End: 2021-04-12

## 2021-04-12 DIAGNOSIS — M54.50 CHRONIC LOW BACK PAIN, UNSPECIFIED BACK PAIN LATERALITY, UNSPECIFIED WHETHER SCIATICA PRESENT: Primary | ICD-10-CM

## 2021-04-12 DIAGNOSIS — M54.2 CERVICALGIA: ICD-10-CM

## 2021-04-12 DIAGNOSIS — G89.29 CHRONIC LOW BACK PAIN, UNSPECIFIED BACK PAIN LATERALITY, UNSPECIFIED WHETHER SCIATICA PRESENT: Primary | ICD-10-CM

## 2021-04-12 PROCEDURE — 97113 AQUATIC THERAPY/EXERCISES: CPT | Performed by: PHYSICAL THERAPIST

## 2021-04-12 NOTE — PROGRESS NOTES
Physical Therapy Daily Progress Note    Patient: Johanna Swan   : 1955  Diagnosis/ICD-10 Code:  Chronic low back pain, unspecified back pain laterality, unspecified whether sciatica present [M54.5, G89.29]  Referring practitioner: Iban Gr MD  Date of Initial Visit: Type: THERAPY  Noted: 3/19/2021  Today's Date: 2021  Patient seen for 4 sessions           Subjective Evaluation    History of Present Illness    Subjective comment: no need for collar today. ready to relax, relating PMH of syringomyecele. back on meds and getting her 1st epidural in a year this Friday. not a candidate for pain stimulators per current pain management MD due to severity of stenosis so has to depend on meds.        Objective   See Exercise, Manual, and Modality Logs for complete treatment.       Assessment & Plan     Assessment  Assessment details: Did well today. Increased reps and speed on TM. No neck pain post rx today but noodle push downs could increase this so assess prior to next rx               Timed:    Manual Therapy:         mins  60598;  Therapeutic Exercise:         mins  32447;     Neuromuscular Torie:        mins  11887;    Therapeutic Activity:          mins  86936;     Gait Training:           mins  00337;     Ultrasound:          mins  49978;    Electrical Stimulation:         mins  58414 ( );  Iontophoresis         mins 08081;  Aquatic Therapy    45     mins 70875;  Dry Needling                   mins    Untimed:  Electrical Stimulation:         mins  08648 ( );  Mechanical Traction:         mins  90830;     Timed Treatment:   45   mins   Total Treatment:     45   mins  Zorre Zeno Kimura, PT  Physical Therapist

## 2021-04-19 ENCOUNTER — TREATMENT (OUTPATIENT)
Dept: PHYSICAL THERAPY | Facility: CLINIC | Age: 66
End: 2021-04-19

## 2021-04-19 DIAGNOSIS — M54.2 CERVICALGIA: ICD-10-CM

## 2021-04-19 DIAGNOSIS — G89.29 CHRONIC LOW BACK PAIN, UNSPECIFIED BACK PAIN LATERALITY, UNSPECIFIED WHETHER SCIATICA PRESENT: Primary | ICD-10-CM

## 2021-04-19 DIAGNOSIS — M54.50 CHRONIC LOW BACK PAIN, UNSPECIFIED BACK PAIN LATERALITY, UNSPECIFIED WHETHER SCIATICA PRESENT: Primary | ICD-10-CM

## 2021-04-19 PROCEDURE — 97113 AQUATIC THERAPY/EXERCISES: CPT | Performed by: PHYSICAL THERAPIST

## 2021-04-19 NOTE — PROGRESS NOTES
Physical Therapy Daily Progress Note  VISIT#: 5 POC 20 only eval + 7 approved for now till 5/20/21    Subjective   Patient running 6 min late...  Johanna Chandlerts reports:  Better day so far, came in with 3/10 LBP. Had epidural Friday. Feels it helped. Also feels pool therapy is helping. Feels she had a good work out last PT visit. Not reporting adverse effect.  Is having tatoo worked on this week, may need to cancel Friday PT visit.       Objective       See Exercise, Manual, and Modality Logs for complete treatment.   Please note: wears hearing aids  Patient Education: pool program.  Worked on balance no HHA during 3 way SLR.  Today in/out with steps     (Likes to take shower after, in Bathroom.)   Only rinsed off today.      Assessment/Plan    Felt better while she was in the pool.  No need to use neck collar today.     Progress per Plan of Care and Progress strengthening /stabilization /functional activity    As tolerated        Timed:         Manual Therapy:         mins  78771;     Therapeutic Exercise:         mins  03220;     Neuromuscular Torie:        mins  85789;    Therapeutic Activity:          mins  38426;     Gait Training:          mins  95204;     Ultrasound:          mins  20497;    Ionto                                   mins   21528  Self Care                            mins   44069  Canalith Repos                   mins  4209  Aquatic                        39       mins 09708    Un-Timed:  Electrical Stimulation:         mins  95728 ( );  Dry Needling          mins self-pay  Traction          mins 00075    Timed Treatment:  39    mins   Total Treatment:    39   mins    Pau Ruvalcaba, PT  [place] License # [id number]  Physical Therapist

## 2021-04-27 ENCOUNTER — TELEPHONE (OUTPATIENT)
Dept: FAMILY MEDICINE CLINIC | Facility: CLINIC | Age: 66
End: 2021-04-27

## 2021-04-27 NOTE — TELEPHONE ENCOUNTER
Caller: Johanna Swan    Relationship to patient: Self    Best call back number:[phone]    Patient is needing: PATIENT IS CALLING TO STATE THAT SHE ALREADY ATE, AND IS WANTING TO KNOW SHE SHOULD COME IN WITHOUT GETTING LABS DONE.  SHE STATES SHE WAS SCHEDULED TO COME IN ON 04/23/21, BUT WAS CANCELED.      UNABLE TO WARM TRANSFER.    PLEASE ADVISE.

## 2021-04-30 ENCOUNTER — TREATMENT (OUTPATIENT)
Dept: PHYSICAL THERAPY | Facility: CLINIC | Age: 66
End: 2021-04-30

## 2021-04-30 ENCOUNTER — TELEPHONE (OUTPATIENT)
Dept: FAMILY MEDICINE CLINIC | Facility: CLINIC | Age: 66
End: 2021-04-30

## 2021-04-30 DIAGNOSIS — G89.29 CHRONIC LOW BACK PAIN, UNSPECIFIED BACK PAIN LATERALITY, UNSPECIFIED WHETHER SCIATICA PRESENT: Primary | ICD-10-CM

## 2021-04-30 DIAGNOSIS — M54.50 CHRONIC LOW BACK PAIN, UNSPECIFIED BACK PAIN LATERALITY, UNSPECIFIED WHETHER SCIATICA PRESENT: Primary | ICD-10-CM

## 2021-04-30 DIAGNOSIS — M54.2 CERVICALGIA: ICD-10-CM

## 2021-04-30 PROCEDURE — 97113 AQUATIC THERAPY/EXERCISES: CPT | Performed by: PHYSICAL THERAPIST

## 2021-04-30 NOTE — PROGRESS NOTES
Physical Therapy Daily Progress Note  VISIT#: 6 POC 20 only eval + 7 approved for now till 5/20/21    Subjective   [facility] reports:  2/10 LBP and thoracic BP. Used heated seat on the way here that helped.       Objective       See Exercise, Manual, and Modality Logs for complete treatment.   Please note: wears hearing aids  Patient Education: pool program.  Worked on balance no HHA during 3 way SLR.  Today in/out with steps     (Likes to take shower after, in Bathroom.)     Assessment/Plan    Felt better while she was in the pool.  No need for neck collar today.     Progress per Plan of Care and Progress strengthening /stabilization /functional activity    As tolerated        Timed:         Manual Therapy:         mins  81477;     Therapeutic Exercise:         mins  87586;     Neuromuscular Torie:        mins  00643;    Therapeutic Activity:          mins  02144;     Gait Training:          mins  38201;     Ultrasound:          mins  63574;    Ionto                                   mins   37562  Self Care                            mins   47296  Canalith Repos                   mins  4209  Aquatic                        45       mins 91267    Un-Timed:  Electrical Stimulation:         mins  27798 ( );  Dry Needling          mins self-pay  Traction          mins 06034    Timed Treatment:  45    mins   Total Treatment:    45   mins    Pau Ruvalcaba PT  [place] License # [id number]  Physical Therapist

## 2021-05-03 ENCOUNTER — TREATMENT (OUTPATIENT)
Dept: PHYSICAL THERAPY | Facility: CLINIC | Age: 66
End: 2021-05-03

## 2021-05-03 DIAGNOSIS — M54.50 CHRONIC LOW BACK PAIN, UNSPECIFIED BACK PAIN LATERALITY, UNSPECIFIED WHETHER SCIATICA PRESENT: Primary | ICD-10-CM

## 2021-05-03 DIAGNOSIS — G89.29 CHRONIC LOW BACK PAIN, UNSPECIFIED BACK PAIN LATERALITY, UNSPECIFIED WHETHER SCIATICA PRESENT: Primary | ICD-10-CM

## 2021-05-03 DIAGNOSIS — M54.2 CERVICALGIA: ICD-10-CM

## 2021-05-03 PROCEDURE — 97113 AQUATIC THERAPY/EXERCISES: CPT | Performed by: PHYSICAL THERAPIST

## 2021-05-03 NOTE — PROGRESS NOTES
Physical Therapy Daily Progress Note  VISIT#: 7 POC 20 only eval + 7 approved for now till 5/20/21    Subjective   Johanna Swan reports:  Feeling better 2/10 LBP. Loosing weight also.  Got information about dry needling, wants to get it done.      Objective       See Exercise, Manual, and Modality Logs for complete treatment.   Please note: wears hearing aids  Patient Education: pool program.    Worked on balance no HHA during 3 way SLR.  Today in/out with steps     (Likes to take shower after, in Bathroom.)     Assessment/Plan    Felt better while she was in the pool.  Liked the side jets by the ducks.     Progress per Plan of Care and Progress strengthening /stabilization /functional activity    As tolerated        Timed:         Manual Therapy:         mins  95959;     Therapeutic Exercise:         mins  97403;     Neuromuscular Torie:        mins  22623;    Therapeutic Activity:          mins  36189;     Gait Training:          mins  14255;     Ultrasound:          mins  36180;    Ionto                                   mins   19833  Self Care                            mins   01912  Canalith Repos                   mins  4209  Aquatic                        45       mins 60434    Un-Timed:  Electrical Stimulation:         mins  74245 ( );  Dry Needling          mins self-pay  Traction          mins 48627    Timed Treatment:  45    mins   Total Treatment:    45   mins    Pau Ruvalcaba PT  [place] License # [id number]  Physical Therapist

## 2021-05-10 ENCOUNTER — TREATMENT (OUTPATIENT)
Dept: PHYSICAL THERAPY | Facility: CLINIC | Age: 66
End: 2021-05-10

## 2021-05-10 DIAGNOSIS — M54.50 CHRONIC LOW BACK PAIN, UNSPECIFIED BACK PAIN LATERALITY, UNSPECIFIED WHETHER SCIATICA PRESENT: Primary | ICD-10-CM

## 2021-05-10 DIAGNOSIS — I10 ESSENTIAL HYPERTENSION: ICD-10-CM

## 2021-05-10 DIAGNOSIS — G89.29 CHRONIC LOW BACK PAIN, UNSPECIFIED BACK PAIN LATERALITY, UNSPECIFIED WHETHER SCIATICA PRESENT: Primary | ICD-10-CM

## 2021-05-10 DIAGNOSIS — M54.2 CERVICALGIA: ICD-10-CM

## 2021-05-10 PROCEDURE — 97113 AQUATIC THERAPY/EXERCISES: CPT | Performed by: PHYSICAL THERAPIST

## 2021-05-10 PROCEDURE — 97110 THERAPEUTIC EXERCISES: CPT | Performed by: PHYSICAL THERAPIST

## 2021-05-10 RX ORDER — BENAZEPRIL HYDROCHLORIDE 5 MG/1
TABLET, FILM COATED ORAL
Qty: 90 TABLET | Refills: 1 | OUTPATIENT
Start: 2021-05-10

## 2021-05-10 RX ORDER — BENAZEPRIL HYDROCHLORIDE 10 MG/1
10 TABLET ORAL DAILY
Qty: 90 TABLET | Refills: 3 | Status: SHIPPED | OUTPATIENT
Start: 2021-05-10 | End: 2022-01-06 | Stop reason: SDUPTHER

## 2021-05-10 NOTE — PROGRESS NOTES
Re-Assessment / Re-Certification        Patient: Johanna Swan   : 1955  Diagnosis/ICD-10 Code:  Chronic low back pain, unspecified back pain laterality, unspecified whether sciatica present [M54.5, G89.29]  Referring practitioner: Iban Gr MD  Date of Initial Visit: Type: THERAPY  Noted: 3/19/2021  Today's Date: 5/10/2021  Patient seen for 8 sessions      Subjective:   Johanna Swan reports: overall at least 25% better. Can start doing more around the house, like picking up, visit with the grandchildren, load the  better... able to sit a little longer without pain going up.  Subjective Questionnaire: Oswestry: 52% was 62% ; NDI; 62% was 68% at [facility].  Clinical Progress: improved, strength with less pain.  Home Program Compliance: Yes  Treatment has included: therapeutic exercise and Aquatic PT    Subjective   Objective          Postural Observations  Seated posture: fair (doing better, more aware)  Standing posture: fair  Correction of posture: makes symptoms better (sitting with lumbar roll)        Active Range of Motion   Cervical/Thoracic Spine   Cervical  Subcranial protraction: WFL   Subcranial retraction: restricted and with pain   Flexion: 25 (strain in neck and upper back during) degrees with pain  Extension: 55 (worse R side neck pain during) degrees with pain  Left lateral flexion: 35 (R UT pain during ) degrees with pain  Right lateral flexion: 40 (L UT stretch during) degrees with pain  Left rotation: 70 (L UT strain during) degrees with pain  Right rotation: 65 (R side neck strain during) degrees with pain    Thoracic   Flexion: Active thoracic flexion: neck pain & LB during today. WFL and with pain  Extension: Active thoracic extension:  thoracic pain better after, had a pop during.   Left rotation: Active left thoracic rotation: L hip &LB pain worse during. WFL  Right rotation: Active right thoracic rotation: R upper back pain worse during. WFL and with pain    Lumbar    Flexion: 77 (increased LBP during and stretching back of legs) degrees with pain  Extension: 18 (makes LB feel better but hips sore during) degrees   Left lateral flexion: 25 (same side hip pain during) degrees with pain  Right lateral flexion: 22 (same side hip strain during) degrees with pain    Additional Active Range of Motion Details  Access Code: [email or web address]    Sensation: UE R side normal, left arm lateral decreased and pinky numb to light touch.  LE: normal sensation R leg, lateral left hip to front thigh, medial lower leg light numb today, foot normal.  MMT: UE 5/5 except shoulders flex/abd producing BP, 4+/5 holding back due to pain and fear of pain.  MMT Legs: 5/5 but hip c/o pain with hip flex testing.   SLR R>L (+) LBP        Assessment/Plan  Progress toward previous goals: Partially Met  STG's (2 - 4 weeks)  1. Tolerate 45 minutes aquatic therapy with reduction in pain - met  2. Able to ambulate x 10 min on Aquatic TM with improved gait pattern - met  3. Assessed additional benefit from cervical and or lumbar traction for potential home use of cervical traction - not yet    LTG's (by d/c)  1. Independent with HEP and able to manage condition independently - not met  2. Improved neck AROM rotation to 80 degrees or better - not met  3. Decrease overall pain 50% or more  - not met  4. Significant improvement on outcome measures - making progress  5. Easier sit to  the am - met     Goals  Short-term goals (STG): 2/3  Long-term goals (LTG): 1/5 met      Recommendations: Continue as planned, requesting 12 more PT visits insurance approval to continue, POC  Is 20.   Timeframe: 3 months  Prognosis to achieve goals: fair    PT Signature: aPu Ruvalcaba, ISIDRA      Based upon review of the patient's progress and continued therapy plan, it is my medical opinion that Johanna Swan should continue physical therapy treatment at [facility] PHYSICAL THERAPY  [address]  [facility]  [place] [address] [phone].    Signature: __________________________________  Iban Gr MD    Timed:         Manual Therapy:         mins  28021;     Therapeutic Exercise:    15     mins  44820;     Neuromuscular Torie:        mins  87937;    Therapeutic Activity:          mins  56155;     Gait Training:           mins  18237;     Ultrasound:          mins  04040;    Ionto                                   mins   57172  Self Care                            mins   24916  Aquatic                          30     mins 56702      Un-Timed:  Electrical Stimulation:         mins  27451 ( );  Dry Needling          mins self-pay  Traction          mins 93737  Low Eval          Mins  89033  Mod Eval          Mins  54394  High Eval                            Mins  29896  Re-Eval                               mins  41636      Timed Treatment:   45   mins   Total Treatment:     45   mins

## 2021-05-17 ENCOUNTER — TELEPHONE (OUTPATIENT)
Dept: FAMILY MEDICINE CLINIC | Facility: CLINIC | Age: 66
End: 2021-05-17

## 2021-05-17 DIAGNOSIS — Z11.52 ENCOUNTER FOR SCREENING FOR COVID-19: Primary | ICD-10-CM

## 2021-05-17 NOTE — TELEPHONE ENCOUNTER
At my approval, RONEN Spicer entered order brought for me to sign, she is faxing to number as requested

## 2021-05-17 NOTE — TELEPHONE ENCOUNTER
patients spouse is getting a difibulator put in on wednesday and they are doing a precovid screen and [place] is wanting to know if  can place an order for her to get tested as well so she can go back with him. [facility] I did advise patient that  was not in [place] today that she is in [place] so i would have to reach out to you and see. I have their fax number [phone]

## 2021-05-18 ENCOUNTER — TREATMENT (OUTPATIENT)
Dept: PHYSICAL THERAPY | Facility: CLINIC | Age: 66
End: 2021-05-18

## 2021-05-18 DIAGNOSIS — G89.29 CHRONIC LOW BACK PAIN, UNSPECIFIED BACK PAIN LATERALITY, UNSPECIFIED WHETHER SCIATICA PRESENT: Primary | ICD-10-CM

## 2021-05-18 DIAGNOSIS — M54.2 CERVICALGIA: ICD-10-CM

## 2021-05-18 DIAGNOSIS — M54.50 CHRONIC LOW BACK PAIN, UNSPECIFIED BACK PAIN LATERALITY, UNSPECIFIED WHETHER SCIATICA PRESENT: Primary | ICD-10-CM

## 2021-05-18 PROCEDURE — 97113 AQUATIC THERAPY/EXERCISES: CPT | Performed by: PHYSICAL THERAPIST

## 2021-05-18 NOTE — PROGRESS NOTES
Physical Therapy Daily Progress Note  VISIT#: 9 POC 20 only eval + 7 approved for now till 5/20/21    Subjective   Johanna Swan reports:  Feeling better 2/10 neck and LBP to 3/10 thoracic pain.   Wants to talk to somebody about insurance payments.    Objective       See Exercise, Manual, and Modality Logs for complete treatment.     Please note: wears hearing aids    Patient Education: pool program.  Discussed walking at least 30 min daily, does go shopping and holds on to a cart some of the time.    Worked on balance no HHA during 3 way SLR.  Today in/out with steps     (Likes to take shower after, in Bathroom.)     Assessment/Plan    Feels better while she is in the pool.   Admitting she is not ex as she should at home.  Likes the side jets by the ducks.     Progress per Plan of Care and Progress strengthening /stabilization /functional activity    As tolerated        Timed:         Manual Therapy:         mins  22379;     Therapeutic Exercise:         mins  58640;     Neuromuscular Torie:        mins  83523;    Therapeutic Activity:          mins  48626;     Gait Training:          mins  30208;     Ultrasound:          mins  26227;    Ionto                                   mins   36074  Self Care                            mins   12262  Canalith Repos                   mins  4209  Aquatic                        45       mins 14201    Un-Timed:  Electrical Stimulation:         mins  07745 ( );  Dry Needling          mins self-pay  Traction          mins 70850    Timed Treatment:  45    mins   Total Treatment:    45   mins    Pau Ruvalcaba PT  [place] License # [id number]  Physical Therapist

## 2021-05-21 ENCOUNTER — OFFICE VISIT (OUTPATIENT)
Dept: FAMILY MEDICINE CLINIC | Facility: CLINIC | Age: 66
End: 2021-05-21

## 2021-05-21 VITALS
OXYGEN SATURATION: 94 % | HEART RATE: 94 BPM | WEIGHT: 186 LBS | HEIGHT: 63 IN | SYSTOLIC BLOOD PRESSURE: 133 MMHG | RESPIRATION RATE: 16 BRPM | BODY MASS INDEX: 32.96 KG/M2 | DIASTOLIC BLOOD PRESSURE: 81 MMHG | TEMPERATURE: 97.5 F

## 2021-05-21 DIAGNOSIS — E11.9 TYPE 2 DIABETES MELLITUS WITHOUT COMPLICATION, WITHOUT LONG-TERM CURRENT USE OF INSULIN (HCC): Primary | ICD-10-CM

## 2021-05-21 DIAGNOSIS — M79.672 PAIN IN BOTH FEET: ICD-10-CM

## 2021-05-21 DIAGNOSIS — M79.671 PAIN IN BOTH FEET: ICD-10-CM

## 2021-05-21 DIAGNOSIS — E03.9 ACQUIRED HYPOTHYROIDISM: ICD-10-CM

## 2021-05-21 DIAGNOSIS — M50.30 DEGENERATIVE DISC DISEASE, CERVICAL: ICD-10-CM

## 2021-05-21 DIAGNOSIS — M25.374 INSTABILITY OF JOINT OF BOTH FEET: ICD-10-CM

## 2021-05-21 DIAGNOSIS — M21.612 BUNION, LEFT FOOT: ICD-10-CM

## 2021-05-21 DIAGNOSIS — R26.89 BALANCE PROBLEM: ICD-10-CM

## 2021-05-21 DIAGNOSIS — E11.9 ENCOUNTER FOR DIABETIC FOOT EXAM (HCC): ICD-10-CM

## 2021-05-21 DIAGNOSIS — R26.9 GAIT ABNORMALITY: ICD-10-CM

## 2021-05-21 DIAGNOSIS — M79.672 BILATERAL FOOT PAIN: ICD-10-CM

## 2021-05-21 DIAGNOSIS — I10 ESSENTIAL HYPERTENSION: ICD-10-CM

## 2021-05-21 DIAGNOSIS — M79.671 BILATERAL FOOT PAIN: ICD-10-CM

## 2021-05-21 DIAGNOSIS — E78.2 MIXED HYPERLIPIDEMIA: ICD-10-CM

## 2021-05-21 DIAGNOSIS — E11.42 DIABETIC POLYNEUROPATHY ASSOCIATED WITH TYPE 2 DIABETES MELLITUS (HCC): ICD-10-CM

## 2021-05-21 DIAGNOSIS — Z11.59 NEED FOR HEPATITIS C SCREENING TEST: ICD-10-CM

## 2021-05-21 DIAGNOSIS — M25.375 INSTABILITY OF JOINT OF BOTH FEET: ICD-10-CM

## 2021-05-21 PROCEDURE — 99214 OFFICE O/P EST MOD 30 MIN: CPT | Performed by: FAMILY MEDICINE

## 2021-05-21 NOTE — PROGRESS NOTES
Chief Complaint  Diabetes    Subjective          History of Present Illness  Johanna Swan presents today for follow-up on diabetes and hypertension.     Patient states she checks her sugars at home. Patient states her sugars are usually . Patient also needs as diabetic foot exam for her to get diabetic shoes.  She had previously seen podiatrist, Dr. Stevie Up.  She does have a history of diabetic neuropathy, bunion of right foot, right foot instability, abnormality of gait and mobility.  She reports she does have some chronic pain in both feet mostly in the great toes.    Going to pain management, doing PT, aqua therapy    Current Outpatient Medications on File Prior to Visit   Medication Sig   • albuterol sulfate  (90 Base) MCG/ACT inhaler Inhale 2 puffs Every 6 (Six) Hours As Needed for Wheezing.   • Amitiza 24 MCG capsule Take 1 capsule by mouth 2 (Two) Times a Day.   • benazepril (LOTENSIN) 10 MG tablet Take 1 tablet by mouth Daily.   • dicyclomine (BENTYL) 10 MG capsule Take 1 capsule by mouth 4 (Four) Times a Day Before Meals & at Bedtime. If needed for colon spasm/cramping   • Dulaglutide (Trulicity) 0.75 MG/0.5ML solution pen-injector Inject 0.75 mg under the skin into the appropriate area as directed 1 (One) Time Per Week.   • DULoxetine (CYMBALTA) 60 MG capsule Take 1 capsule by mouth Daily.   • Erenumab-aooe (Aimovig) 140 MG/ML solution auto-injector Inject 1 mg under the skin into the appropriate area as directed Every 30 (Thirty) Days.   • esomeprazole (NEXIUM) 40 MG capsule Take 1 capsule by mouth Daily.   • glucose blood (OneTouch Verio) test strip 1 each by Other route Daily. Use as instructed Dx E11.9   • HYDROcodone-acetaminophen (NORCO) 5-325 MG per tablet Take 1 tablet by mouth 2 (Two) Times a Day As Needed.   • lidocaine (XYLOCAINE) 5 % ointment APPLY .5 G OF CREAM TO AFFECTED AREA NOT EXCEEDING MORE THEN 13 TUBE PER DAY.   • Lidocaine-Tetracaine 7-7 % cream APPLY TO AFFECTED  "AREA TWICE DAILY AS NEEDED   • Lyrica 100 MG capsule Take 100 mg by mouth 2 (Two) Times a Day.   • mometasone (Elocon) 0.1 % cream Apply to rash twice daily as needed   • mometasone (NASONEX) 50 MCG/ACT nasal spray 2 sprays into the nostril(s) as directed by provider Daily.   • rizatriptan (MAXALT) 10 MG tablet Take 1 tablet by mouth 1 (One) Time As Needed for Migraine (May repeat in 2 hours if needed for persistent headache).   • rosuvastatin (CRESTOR) 5 MG tablet Take 1 tablet by mouth Daily.   • Synthroid 88 MCG tablet TAKE 1 TABLET DAILY   • tamsulosin (FLOMAX) 0.4 MG capsule 24 hr capsule Take 1 capsule by mouth Daily.   • tiZANidine (ZANAFLEX) 4 MG tablet Take 4 mg by mouth Every Night.   • [DISCONTINUED] Lyrica 75 MG capsule Take 75 mg by mouth 2 (Two) Times a Day.     No current facility-administered medications on file prior to visit.       Objective   Vital Signs:   /81 (BP Location: Left arm, Patient Position: Sitting, Cuff Size: Adult)   Pulse 94   Temp 97.5 °F (36.4 °C) (Infrared)   Resp 16   Ht 160 cm (63\")   Wt 84.4 kg (186 lb)   SpO2 94%   BMI 32.95 kg/m²     Physical Exam  Vitals and nursing note reviewed.   Constitutional:       General: She is not in acute distress.     Appearance: She is well-developed.   HENT:      Head: Normocephalic and atraumatic.   Cardiovascular:      Rate and Rhythm: Normal rate and regular rhythm.      Heart sounds: No murmur heard.     Pulmonary:      Effort: Pulmonary effort is normal.      Breath sounds: Normal breath sounds. No wheezing.   Musculoskeletal:         General: Normal range of motion.      Right foot: Normal range of motion. No deformity.      Left foot: Normal range of motion. No deformity.      Comments: Patient had to climb onto examination table crawling forward and correcting her position to a sitting position.  Gait is somewhat antalgic   Feet:      Right foot:      Protective Sensation: 10 sites tested. 10 sites sensed.      Skin " integrity: Callus present. No ulcer, skin breakdown or fissure.      Toenail Condition: Right toenails are normal.      Left foot:      Protective Sensation: 10 sites tested. 9 sites sensed.      Skin integrity: No ulcer, skin breakdown, callus or fissure.      Toenail Condition: Left toenails are normal.      Comments: There is bunion deformity at the left first MP joint, callus left foot first MP joint and IP joint laterally.  Skin:     General: Skin is warm and dry.      Findings: No rash.   Neurological:      Mental Status: She is alert and oriented to person, place, and time.                Lab Results   Component Value Date    HGBA1C 6.1 (H) 12/09/2020    HGBA1C 6.6 (H) 07/27/2020    HGBA1C 6.5 (H) 01/17/2020    HGBA1C 6.3 (H) 10/11/2019       Result Review :                       Assessment and Plan    Diagnoses and all orders for this visit:    1. Type 2 diabetes mellitus without complication, without long-term current use of insulin (CMS/[facility]) (Primary)  -     Hemoglobin A1c  -     MicroAlbumin, Urine, Random - Urine, Clean Catch    2. Degenerative disc disease, cervical    3. Mixed hyperlipidemia  -     Lipid Panel    4. Essential hypertension  -     Comprehensive Metabolic Panel  -     CBC & Differential    5. Acquired hypothyroidism  -     TSH  -     T4, Free    6. Pain in both feet    7. Diabetic polyneuropathy associated with type 2 diabetes mellitus (CMS/[facility])    8. Bunion, left foot    9. Instability of joint of both feet    10. Bilateral foot pain    11. Balance problem    12. Gait abnormality    13. Need for hepatitis C screening test  -     Hepatitis C Antibody    14. Encounter for diabetic foot exam (CMS/[facility])      Diabetes, reported decent control, checking A1c along with other labs today.  Will adjust medications if needed anticipate continuing medicines as per current medication list.  Diabetic foot exam form completed to return to podiatrist for diabetic shoes/inserts.  Hypertension at goal  continue current medications.  Medications Discontinued During This Encounter   Medication Reason   • Lyrica 75 MG capsule Dose adjustment         Follow Up     Return in about 3 months (around 8/21/2021) for Recheck, diabetes, htn.    Patient was given instructions and counseling regarding her condition or for health maintenance advice. Please see specific information pulled into the AVS if appropriate.

## 2021-05-22 LAB
ALBUMIN SERPL-MCNC: 4.3 G/DL (ref 3.8–4.8)
ALBUMIN/GLOB SERPL: 1.5 {RATIO} (ref 1.2–2.2)
ALP SERPL-CCNC: 94 IU/L (ref 48–121)
ALT SERPL-CCNC: 12 IU/L (ref 0–32)
AST SERPL-CCNC: 15 IU/L (ref 0–40)
BASOPHILS # BLD AUTO: 0.1 X10E3/UL (ref 0–0.2)
BASOPHILS NFR BLD AUTO: 2 %
BILIRUB SERPL-MCNC: 0.7 MG/DL (ref 0–1.2)
BUN SERPL-MCNC: 22 MG/DL (ref 8–27)
BUN/CREAT SERPL: 27 (ref 12–28)
CALCIUM SERPL-MCNC: 9.2 MG/DL (ref 8.7–10.3)
CHLORIDE SERPL-SCNC: 105 MMOL/L (ref 96–106)
CHOLEST SERPL-MCNC: 189 MG/DL (ref 100–199)
CO2 SERPL-SCNC: 26 MMOL/L (ref 20–29)
CREAT SERPL-MCNC: 0.82 MG/DL (ref 0.57–1)
EOSINOPHIL # BLD AUTO: 0.1 X10E3/UL (ref 0–0.4)
EOSINOPHIL NFR BLD AUTO: 2 %
ERYTHROCYTE [DISTWIDTH] IN BLOOD BY AUTOMATED COUNT: 13.4 % (ref 11.7–15.4)
GLOBULIN SER CALC-MCNC: 2.8 G/DL (ref 1.5–4.5)
GLUCOSE SERPL-MCNC: 87 MG/DL (ref 65–99)
HBA1C MFR BLD: 6 % (ref 4.8–5.6)
HCT VFR BLD AUTO: 37.1 % (ref 34–46.6)
HCV AB S/CO SERPL IA: <0.1 S/CO RATIO (ref 0–0.9)
HDLC SERPL-MCNC: 60 MG/DL
HGB BLD-MCNC: 12.6 G/DL (ref 11.1–15.9)
IMM GRANULOCYTES # BLD AUTO: 0 X10E3/UL (ref 0–0.1)
IMM GRANULOCYTES NFR BLD AUTO: 0 %
LDLC SERPL CALC-MCNC: 112 MG/DL (ref 0–99)
LYMPHOCYTES # BLD AUTO: 1.4 X10E3/UL (ref 0.7–3.1)
LYMPHOCYTES NFR BLD AUTO: 33 %
MCH RBC QN AUTO: 30.3 PG (ref 26.6–33)
MCHC RBC AUTO-ENTMCNC: 34 G/DL (ref 31.5–35.7)
MCV RBC AUTO: 89 FL (ref 79–97)
MICROALBUMIN UR-MCNC: 9.3 UG/ML
MONOCYTES # BLD AUTO: 0.4 X10E3/UL (ref 0.1–0.9)
MONOCYTES NFR BLD AUTO: 9 %
NEUTROPHILS # BLD AUTO: 2.3 X10E3/UL (ref 1.4–7)
NEUTROPHILS NFR BLD AUTO: 54 %
PLATELET # BLD AUTO: 334 X10E3/UL (ref 150–450)
POTASSIUM SERPL-SCNC: 4.3 MMOL/L (ref 3.5–5.2)
PROT SERPL-MCNC: 7.1 G/DL (ref 6–8.5)
RBC # BLD AUTO: 4.16 X10E6/UL (ref 3.77–5.28)
SODIUM SERPL-SCNC: 143 MMOL/L (ref 134–144)
T4 FREE SERPL-MCNC: 1.13 NG/DL (ref 0.82–1.77)
TRIGL SERPL-MCNC: 97 MG/DL (ref 0–149)
TSH SERPL DL<=0.005 MIU/L-ACNC: 2.05 UIU/ML (ref 0.45–4.5)
VLDLC SERPL CALC-MCNC: 17 MG/DL (ref 5–40)
WBC # BLD AUTO: 4.2 X10E3/UL (ref 3.4–10.8)

## 2021-05-25 ENCOUNTER — TREATMENT (OUTPATIENT)
Dept: PHYSICAL THERAPY | Facility: CLINIC | Age: 66
End: 2021-05-25

## 2021-05-25 DIAGNOSIS — M54.2 CERVICALGIA: ICD-10-CM

## 2021-05-25 DIAGNOSIS — G89.29 CHRONIC LOW BACK PAIN, UNSPECIFIED BACK PAIN LATERALITY, UNSPECIFIED WHETHER SCIATICA PRESENT: Primary | ICD-10-CM

## 2021-05-25 DIAGNOSIS — M54.50 CHRONIC LOW BACK PAIN, UNSPECIFIED BACK PAIN LATERALITY, UNSPECIFIED WHETHER SCIATICA PRESENT: Primary | ICD-10-CM

## 2021-05-25 PROCEDURE — 97113 AQUATIC THERAPY/EXERCISES: CPT | Performed by: PHYSICAL THERAPIST

## 2021-05-25 NOTE — PROGRESS NOTES
Physical Therapy Daily Progress Note       VISIT#: 10 POC 20/ 13 total approved till 7/9/21    Subjective   Johanna Swan reports:  Feeling better 2/10 mainly thoracic pain.     Objective       See Exercise, Manual, and Modality Logs for complete treatment.     Please note: wears hearing aids    Patient Education: pool program.  Discussed doing thoracic and lumbar ext regular to keep pain from coming back.   Talked about stop having soda's, too much chocolate.    Worked on balance no HHA during 3 way SLR.  Today in/out with steps     (Likes to take shower after, in Bathroom.)     Assessment/Plan    Feels better while she is in the pool.   Admitting she is not ex as she should at home.  Likes the side jets by the ducks.     Progress per Plan of Care and Progress strengthening /stabilization /functional activity    As tolerated        Timed:         Manual Therapy:         mins  01325;     Therapeutic Exercise:         mins  08128;     Neuromuscular Torie:        mins  07876;    Therapeutic Activity:          mins  88058;     Gait Training:          mins  68435;     Ultrasound:          mins  87542;    Ionto                                   mins   83920  Self Care                            mins   00167  Canalith Repos                   mins  4209  Aquatic                        45       mins 08251    Un-Timed:  Electrical Stimulation:         mins  23957 ( );  Dry Needling          mins self-pay  Traction          mins 38575    Timed Treatment:  45    mins   Total Treatment:    45   mins    Pau Ruvalcaba PT  [place] License # [id number]  Physical Therapist

## 2021-06-02 ENCOUNTER — TREATMENT (OUTPATIENT)
Dept: PHYSICAL THERAPY | Facility: CLINIC | Age: 66
End: 2021-06-02

## 2021-06-02 DIAGNOSIS — M54.2 CERVICALGIA: ICD-10-CM

## 2021-06-02 DIAGNOSIS — M54.50 CHRONIC LOW BACK PAIN, UNSPECIFIED BACK PAIN LATERALITY, UNSPECIFIED WHETHER SCIATICA PRESENT: Primary | ICD-10-CM

## 2021-06-02 DIAGNOSIS — G89.29 CHRONIC LOW BACK PAIN, UNSPECIFIED BACK PAIN LATERALITY, UNSPECIFIED WHETHER SCIATICA PRESENT: Primary | ICD-10-CM

## 2021-06-02 PROCEDURE — 97113 AQUATIC THERAPY/EXERCISES: CPT | Performed by: PHYSICAL THERAPIST

## 2021-06-02 NOTE — PROGRESS NOTES
Physical Therapy Daily Progress Note       VISIT#: 11 POC 20/ 13 total approved till 7/9/21    Subjective   Johanna Swan reports:  Feeling better 1/10 mainly thoracic pain. Planning on joining [employer] at the [facility] in [place], so she can continue with her pool program. Has been cutting down on the soda and doing her extension ex. Is scheduled to have some martine of injections in her neck on Friday.    Objective       See Exercise, Manual, and Modality Logs for complete treatment.     Please note: wears hearing aids    Patient Education: pool program.  Discussed: not sitting waiting in car, get out and walk around and get some ex instead(when she is waiting for husbands aptm)    Worked on balance no HHA during 3 way SLR.  Today in/out with steps     (Likes to take shower after, in Bathroom.)     Assessment/Plan    Feels better while she is in the pool. Was glad she came, despite to rain and the early time.   Likes the side jets by the ducks.     Progress per Plan of Care and Progress strengthening /stabilization /functional activity    As tolerated        Timed:         Manual Therapy:         mins  30235;     Therapeutic Exercise:         mins  44090;     Neuromuscular Torie:        mins  64186;    Therapeutic Activity:          mins  85887;     Gait Training:          mins  99119;     Ultrasound:          mins  21568;    Ionto                                   mins   02765  Self Care                            mins   09235  Canalith Repos                   mins  4209  Aquatic                        45       mins 94074    Un-Timed:  Electrical Stimulation:         mins  56121 ( );  Dry Needling          mins self-pay  Traction          mins 49381    Timed Treatment:  45    mins   Total Treatment:    45   mins    Pau Ruvalcaba PT  [place] License # [id number]  Physical Therapist

## 2021-06-07 DIAGNOSIS — E03.9 ACQUIRED HYPOTHYROIDISM: ICD-10-CM

## 2021-06-07 RX ORDER — ROSUVASTATIN CALCIUM 5 MG/1
TABLET, COATED ORAL
Qty: 90 TABLET | Refills: 3 | Status: SHIPPED | OUTPATIENT
Start: 2021-06-07 | End: 2022-06-07

## 2021-06-07 RX ORDER — LEVOTHYROXINE SODIUM 88 MCG
TABLET ORAL
Qty: 90 TABLET | Refills: 3 | Status: SHIPPED | OUTPATIENT
Start: 2021-06-07 | End: 2022-05-26

## 2021-06-15 ENCOUNTER — TREATMENT (OUTPATIENT)
Dept: PHYSICAL THERAPY | Facility: CLINIC | Age: 66
End: 2021-06-15

## 2021-06-15 DIAGNOSIS — G89.29 CHRONIC LOW BACK PAIN, UNSPECIFIED BACK PAIN LATERALITY, UNSPECIFIED WHETHER SCIATICA PRESENT: Primary | ICD-10-CM

## 2021-06-15 DIAGNOSIS — M54.2 CERVICALGIA: ICD-10-CM

## 2021-06-15 DIAGNOSIS — M54.50 CHRONIC LOW BACK PAIN, UNSPECIFIED BACK PAIN LATERALITY, UNSPECIFIED WHETHER SCIATICA PRESENT: Primary | ICD-10-CM

## 2021-06-15 PROCEDURE — 97113 AQUATIC THERAPY/EXERCISES: CPT | Performed by: PHYSICAL THERAPIST

## 2021-06-15 NOTE — PROGRESS NOTES
Physical Therapy Daily Progress Note       VISIT#: 12 POC 20/ 13 total approved till 7/9/21    Subjective   Johanna Swan reports: came in with 2/10 neck& LB pain today. Joined the [employer] at the [facility] in [place], so she can continue with her pool program.   Had an epidural in her low back, it is helping but not totally gone. Feels like her pain MD is really trying to help her.     Objective       See Exercise, Manual, and Modality Logs for complete treatment.     Please note: wears hearing aids    Patient Education: pool program  Discussed: pain management and Plan for re-assessment next visit.    Exercises printed  Standing Cervical Retraction - 3 x daily - 7 x weekly - 1 sets - 10 reps - 3sec hold  Seated Thoracic Extension with Hands Behind Neck - 3 x daily - 7 x weekly - 1 sets - 10 reps  Standing Lumbar Extension with Counter - 3 x daily - 7 x weekly - 1 sets - 10 reps    Worked on balance no HHA during 3 way SLR.  Today in/out with steps     (Likes to take shower after, in Bathroom.)     Assessment/Plan    Feels better while she is in the pool.   Reports she is overall at least 80% better between pool ex and Injections.  Discussed scheduling the next few visits on land to teach patient management program on land.    Progress per Plan of Care and Progress strengthening /stabilization /functional activity    As tolerated        Timed:         Manual Therapy:         mins  47842;     Therapeutic Exercise:         mins  50437;     Neuromuscular Torie:        mins  77942;    Therapeutic Activity:          mins  37783;     Gait Training:          mins  27874;     Ultrasound:          mins  16169;    Ionto                                   mins   44818  Self Care                            mins   26996  Canalith Repos                   mins  4209  Aquatic                        45       mins 18677    Un-Timed:  Electrical Stimulation:         mins  94429 ( );  Dry Needling          mins  self-pay  Traction          mins 40033    Timed Treatment:  45    mins   Total Treatment:    45   mins    Pau Ruvalcaba, PT  [place] License # [id number]  Physical Therapist

## 2021-06-23 ENCOUNTER — TREATMENT (OUTPATIENT)
Dept: PHYSICAL THERAPY | Facility: CLINIC | Age: 66
End: 2021-06-23

## 2021-06-23 DIAGNOSIS — M54.2 CERVICALGIA: ICD-10-CM

## 2021-06-23 DIAGNOSIS — G89.29 CHRONIC LOW BACK PAIN, UNSPECIFIED BACK PAIN LATERALITY, UNSPECIFIED WHETHER SCIATICA PRESENT: Primary | ICD-10-CM

## 2021-06-23 DIAGNOSIS — M54.50 CHRONIC LOW BACK PAIN, UNSPECIFIED BACK PAIN LATERALITY, UNSPECIFIED WHETHER SCIATICA PRESENT: Primary | ICD-10-CM

## 2021-06-23 PROCEDURE — 97110 THERAPEUTIC EXERCISES: CPT | Performed by: PHYSICAL THERAPIST

## 2021-06-23 NOTE — PROGRESS NOTES
Re-Assessment / Re-Certification        Patient: Johanna Swan   : 1955  Diagnosis/ICD-10 Code:  Chronic low back pain, unspecified back pain laterality, unspecified whether sciatica present [M54.5, G89.29]  Referring practitioner: Iban Gr MD  Date of Initial Visit: Type: THERAPY  Noted: 3/19/2021  Today's Date: 2021  Patient seen for 13 sessions      Subjective:   Johanna Swan reports: overall at least 50% better since start of therapy and injections. Can do more around the house, can stand longer and make it through the grocery store a little better. Pushing the cart helps her last longer.  Subjective Questionnaire: Oswestry: 58% was 62% ; NDI; 56% was 68% at [facility].  Clinical Progress: improved, strength with less pain.  Home Program Compliance: Yes  Treatment has included: therapeutic exercise and Aquatic PT    Subjective     Objective          Postural Observations  Seated posture: fair (doing better, more aware)  Standing posture: fair  Correction of posture: makes symptoms better (uses lumbar roll all the time at home.)        Active Range of Motion   Cervical/Thoracic Spine   Cervical  Subcranial protraction: WFL   Subcranial retraction: restricted   Flexion: 32 (stretch in neck during) degrees   Extension: 70 (stretch during) degrees   Left lateral flexion: 37 (only stretch opposite side both during) degrees   Right lateral flexion: 37 degrees   Left rotation: 75 (L UT strain during) degrees with pain  Right rotation: 79 (R side neck strain during) degrees with pain    Thoracic   Flexion: Active thoracic flexion: neck pain & LB during today. WFL  Extension: Active thoracic extension:  thoracic pain better after.   Left rotation: Active left thoracic rotation: no effect. WFL  Right rotation: Active right thoracic rotation: R upper back pain better during. WFL    Lumbar   Flexion: 80 (increased LBP during and stretching back of legs) degrees with pain  Extension: 23 (LBP worse during  today) degrees with pain  Left lateral flexion: 25 (R thoracic pain worse during) degrees with pain  Right lateral flexion: 30 (no effect) degrees     Additional Active Range of Motion Details  Patient came in with 4/10 LBP and 2/10 neck and Thoracic pain.  After TM warm up LBP worse during, but better after.  Left with no more LBP or neck pain but still 2 to 3/10 Thoracic pain after PT session today.   Progressed from pool to land PT today, to make patient Independent with self management program.   Sensation: UE normal to elbows , medial forearms decreased/numb and Left thumb and Index finger numb, but not to touch.  LE: normal sensation both legs.  MMT: UE & LE 5/5  SLR (-)      Assessment/Plan    Progress toward previous goals: Partially Met  STG's (2 - 4 weeks)  1. Tolerate 45 minutes aquatic therapy with reduction in pain - met  2. Able to ambulate x 10 min on Aquatic TM with improved gait pattern - met  3. Assessed additional benefit from cervical and or lumbar traction for potential home use of cervical traction - not yet    LTG's (by d/c)  1. Independent with HEP and able to manage condition independently - not met  2. Improved neck AROM rotation to 80 degrees or better - making progress  3. Decrease overall pain 50% or more  -  met  4. Significant improvement on outcome measures - making progress  5. Easier sit to  the am - met     Goals  Short-term goals (STG): 2/3  Long-term goals (LTG): 2/5 met      Recommendations: Continue as planned, requesting 7 more PT visits insurance approval to continue, POC  Is 20.   Timeframe: 3 months  Prognosis to achieve goals: fair    PT Signature: Pau Ruvalcaba, ISIDRA      Based upon review of the patient's progress and continued therapy plan, it is my medical opinion that Johanna Swan should continue physical therapy treatment at [facility]  [address] [place] [address] [place] [address] [phone].    Signature:  __________________________________  Iban Gr MD    Timed:         Manual Therapy:         mins  33815;     Therapeutic Exercise:    45     mins  65227;     Neuromuscular Torie:        mins  84134;    Therapeutic Activity:          mins  47843;     Gait Training:           mins  85597;     Ultrasound:          mins  51450;    Ionto                                   mins   74965  Self Care                            mins   98545  Aquatic                               mins 88724      Un-Timed:  Electrical Stimulation:         mins  84223 ( );  Dry Needling          mins self-pay  Traction          mins 84404  Low Eval          Mins  48962  Mod Eval          Mins  86335  High Eval                            Mins  40338  Re-Eval                               mins  18697      Timed Treatment:   45   mins   Total Treatment:     45   mins

## 2021-07-07 ENCOUNTER — DOCUMENTATION (OUTPATIENT)
Dept: PHYSICAL THERAPY | Facility: CLINIC | Age: 66
End: 2021-07-07

## 2021-07-07 NOTE — PROGRESS NOTES
Discharge Summary  Discharge Summary from Physical Therapy Report      Johanna Swan : 1955  DX. Chronic low back, thoracic and neck pain  Number of Visits: 13     Discharge Status of Patient: See MD Note dated: 21    Goals: Partially Met    Discharge Plan: Continue with current home exercise program as instructed    Comments: patient called today and cx. Her 2 remaining PT sessions stating her OA knees are bothering her too much. She wishes to be DC from PT. Offered to resume PT in the pool instead of land. Patient did not call back. Will DC account. Patient will need a new MD order if she wishes to resume PT at a later time. Patient does have a HEP for land and in the pool.    Date of Discharge: 21        Pau Ruvalcaba, PT  Physical Therapist

## 2021-07-20 ENCOUNTER — OFFICE (AMBULATORY)
Dept: URBAN - METROPOLITAN AREA CLINIC 64 | Facility: CLINIC | Age: 66
End: 2021-07-20

## 2021-07-20 VITALS
HEART RATE: 80 BPM | HEIGHT: 63 IN | SYSTOLIC BLOOD PRESSURE: 123 MMHG | DIASTOLIC BLOOD PRESSURE: 66 MMHG | WEIGHT: 184 LBS

## 2021-07-20 DIAGNOSIS — R15.0 INCOMPLETE DEFECATION: ICD-10-CM

## 2021-07-20 DIAGNOSIS — K21.9 GASTRO-ESOPHAGEAL REFLUX DISEASE WITHOUT ESOPHAGITIS: ICD-10-CM

## 2021-07-20 DIAGNOSIS — K59.00 CONSTIPATION, UNSPECIFIED: ICD-10-CM

## 2021-07-20 PROCEDURE — 99214 OFFICE O/P EST MOD 30 MIN: CPT | Performed by: INTERNAL MEDICINE

## 2021-07-20 RX ORDER — ESOMEPRAZOLE MAGNESIUM 40 MG/1
CAPSULE, DELAYED RELEASE ORAL
Qty: 180 | Refills: 4 | Status: ACTIVE

## 2021-08-05 ENCOUNTER — TELEPHONE (OUTPATIENT)
Dept: FAMILY MEDICINE CLINIC | Facility: CLINIC | Age: 66
End: 2021-08-05

## 2021-08-05 DIAGNOSIS — I10 ESSENTIAL HYPERTENSION: ICD-10-CM

## 2021-08-05 DIAGNOSIS — E11.9 TYPE 2 DIABETES MELLITUS WITHOUT COMPLICATION, WITHOUT LONG-TERM CURRENT USE OF INSULIN (HCC): Primary | ICD-10-CM

## 2021-08-05 DIAGNOSIS — E03.9 ACQUIRED HYPOTHYROIDISM: ICD-10-CM

## 2021-08-05 DIAGNOSIS — E78.2 MIXED HYPERLIPIDEMIA: ICD-10-CM

## 2021-08-05 NOTE — TELEPHONE ENCOUNTER
Pt calling in stating she and her  have appts scheduled for 9/10 and she is requesting lab orders be placed so they can have them done before those appts.

## 2021-08-05 NOTE — TELEPHONE ENCOUNTER
Please inform patient that I have ordered labs for both her and her  there is a separate message in her 's chart.

## 2021-08-31 DIAGNOSIS — K58.9 IRRITABLE BOWEL SYNDROME, UNSPECIFIED TYPE: ICD-10-CM

## 2021-09-01 RX ORDER — DICYCLOMINE HYDROCHLORIDE 10 MG/1
10 CAPSULE ORAL
Qty: 60 CAPSULE | Refills: 0 | Status: SHIPPED | OUTPATIENT
Start: 2021-09-01

## 2021-09-15 ENCOUNTER — OFFICE VISIT (OUTPATIENT)
Dept: FAMILY MEDICINE CLINIC | Facility: CLINIC | Age: 66
End: 2021-09-15

## 2021-09-15 VITALS
BODY MASS INDEX: 33.73 KG/M2 | HEIGHT: 63 IN | DIASTOLIC BLOOD PRESSURE: 80 MMHG | TEMPERATURE: 97.8 F | HEART RATE: 112 BPM | WEIGHT: 190.4 LBS | OXYGEN SATURATION: 97 % | RESPIRATION RATE: 18 BRPM | SYSTOLIC BLOOD PRESSURE: 118 MMHG

## 2021-09-15 DIAGNOSIS — R09.81 SINUS CONGESTION: ICD-10-CM

## 2021-09-15 DIAGNOSIS — E03.9 ACQUIRED HYPOTHYROIDISM: ICD-10-CM

## 2021-09-15 DIAGNOSIS — I10 ESSENTIAL HYPERTENSION: ICD-10-CM

## 2021-09-15 DIAGNOSIS — R05.9 COUGH: ICD-10-CM

## 2021-09-15 DIAGNOSIS — J30.1 SEASONAL ALLERGIC RHINITIS DUE TO POLLEN: ICD-10-CM

## 2021-09-15 DIAGNOSIS — E78.2 MIXED HYPERLIPIDEMIA: ICD-10-CM

## 2021-09-15 DIAGNOSIS — E11.9 TYPE 2 DIABETES MELLITUS WITHOUT COMPLICATION, WITHOUT LONG-TERM CURRENT USE OF INSULIN (HCC): Primary | ICD-10-CM

## 2021-09-15 PROCEDURE — 99214 OFFICE O/P EST MOD 30 MIN: CPT | Performed by: FAMILY MEDICINE

## 2021-09-15 NOTE — PROGRESS NOTES
Chief Complaint  Diabetes, Hypertension, Hyperlipidemia, and Hypothyroidism    Johanna Swan presents today for follow-up on diabetes, hypertension, hyperlipidemia, hypothyroidism also concerned about runny nose which she thinks is allergies but also wanting Covid testing.   has had a cough for a week.  She started with new headache and sinus pain today    Allergy symptoms drippy runny nose since shot in April and May. Have had green with blood tinge. Headache and sinus pain started today.     Diabetes  She presents for her follow-up diabetic visit. She has type 2 diabetes mellitus. Pertinent negatives for hypoglycemia include no headaches or sweats. Pertinent negatives for diabetes include no chest pain and no fatigue. Risk factors for coronary artery disease include diabetes mellitus, dyslipidemia, hypertension and post-menopausal. When asked about meal planning, she reported none. She never participates in exercise. She monitors blood glucose at home 3-4 x per week. Her breakfast blood glucose range is generally  mg/dl. Her overall blood glucose range is 110-130 mg/dl. An ACE inhibitor/angiotensin II receptor blocker is not being taken. She does not see a podiatrist.Eye exam is current (07/2021 [facility]).   Hypertension  This is a chronic problem. The current episode started more than 1 year ago. Pertinent negatives include no chest pain, headaches, orthopnea, palpitations, peripheral edema, PND, shortness of breath or sweats. Risk factors for coronary artery disease include diabetes mellitus, dyslipidemia, obesity and post-menopausal state. Current antihypertension treatment includes ACE inhibitors. The current treatment provides significant improvement.   Hyperlipidemia  This is a chronic problem. The current episode started more than 1 year ago. The problem is uncontrolled. Recent lipid tests were reviewed and are high. Exacerbating diseases include diabetes, hypothyroidism and obesity.  Pertinent negatives include no chest pain or shortness of breath. Current antihyperlipidemic treatment includes statins. The current treatment provides significant improvement of lipids. Risk factors for coronary artery disease include diabetes mellitus, dyslipidemia, hypertension, post-menopausal and obesity.   Hypothyroidism  This is a chronic problem. The current episode started more than 1 year ago. Pertinent negatives include no chest pain, fatigue or headaches. Treatments tried: synthroid 88mcg. The treatment provided significant relief.       Subjective            Current Outpatient Medications on File Prior to Visit   Medication Sig   • albuterol sulfate  (90 Base) MCG/ACT inhaler Inhale 2 puffs Every 6 (Six) Hours As Needed for Wheezing.   • Amitiza 24 MCG capsule Take 1 capsule by mouth 2 (Two) Times a Day.   • benazepril (LOTENSIN) 10 MG tablet Take 1 tablet by mouth Daily.   • dicyclomine (BENTYL) 10 MG capsule Take 1 capsule by mouth 4 (Four) Times a Day Before Meals & at Bedtime.   • Dulaglutide (Trulicity) 0.75 MG/0.5ML solution pen-injector Inject 0.75 mg under the skin into the appropriate area as directed 1 (One) Time Per Week.   • DULoxetine (CYMBALTA) 60 MG capsule Take 1 capsule by mouth Daily.   • Erenumab-aooe (Aimovig) 140 MG/ML solution auto-injector Inject 1 mg under the skin into the appropriate area as directed Every 30 (Thirty) Days.   • esomeprazole (NEXIUM) 40 MG capsule Take 1 capsule by mouth Daily.   • glucose blood (OneTouch Verio) test strip 1 each by Other route Daily. Use as instructed Dx E11.9   • HYDROcodone-acetaminophen (NORCO) 5-325 MG per tablet Take 1 tablet by mouth 2 (Two) Times a Day As Needed.   • lidocaine (XYLOCAINE) 5 % ointment APPLY .5 G OF CREAM TO AFFECTED AREA NOT EXCEEDING MORE THEN 13 TUBE PER DAY.   • Lidocaine-Tetracaine 7-7 % cream APPLY TO AFFECTED AREA TWICE DAILY AS NEEDED   • Lyrica 100 MG capsule Take 100 mg by mouth 2 (Two) Times a Day.   •  "mometasone (Elocon) 0.1 % cream Apply to rash twice daily as needed   • mometasone (NASONEX) 50 MCG/ACT nasal spray 2 sprays into the nostril(s) as directed by provider Daily.   • rizatriptan (MAXALT) 10 MG tablet Take 1 tablet by mouth 1 (One) Time As Needed for Migraine (May repeat in 2 hours if needed for persistent headache).   • rosuvastatin (CRESTOR) 5 MG tablet TAKE 1 TABLET DAILY   • Synthroid 88 MCG tablet TAKE 1 TABLET DAILY   • tamsulosin (FLOMAX) 0.4 MG capsule 24 hr capsule Take 1 capsule by mouth Daily.   • tiZANidine (ZANAFLEX) 4 MG tablet Take 4 mg by mouth Every Night.     No current facility-administered medications on file prior to visit.       Objective   Vital Signs:   /80 (BP Location: Left arm, Patient Position: Sitting, Cuff Size: Adult)   Pulse 112   Temp 97.8 °F (36.6 °C) (Temporal)   Resp 18   Ht 160 cm (63\")   Wt 86.4 kg (190 lb 6.4 oz)   SpO2 97% Comment: Room air  BMI 33.73 kg/m²     Physical Exam  Vitals and nursing note reviewed.   Constitutional:       General: She is not in acute distress.     Appearance: She is well-developed.   HENT:      Head: Normocephalic and atraumatic.      Right Ear: Tympanic membrane, ear canal and external ear normal. There is no impacted cerumen.      Left Ear: Tympanic membrane, ear canal and external ear normal. There is no impacted cerumen.      Nose: Nose normal.      Comments: Mild tenderness to frontal and maxillary sinuses bilaterally nasal mucosa is boggy and there is cobblestoning of the posterior pharynx  Cardiovascular:      Rate and Rhythm: Normal rate and regular rhythm.      Heart sounds: No murmur heard.     Pulmonary:      Effort: Pulmonary effort is normal.      Breath sounds: Normal breath sounds. No wheezing.   Musculoskeletal:         General: Normal range of motion.   Skin:     General: Skin is warm and dry.      Findings: No rash.   Neurological:      Mental Status: She is alert and oriented to person, place, and time. "          Lab Results   Component Value Date     (H) 09/08/2021    BUN 26 09/08/2021    CREATININE 0.83 09/08/2021    EGFRIFNONA 75 09/08/2021    EGFRIFAFRI 86 09/08/2021     09/08/2021    K 4.5 09/08/2021     09/08/2021    CALCIUM 9.4 09/08/2021    ALBUMIN 4.0 09/08/2021    BILITOT 0.4 09/08/2021    ALKPHOS 94 09/08/2021    AST 12 09/08/2021    ALT 12 09/08/2021    CHLPL 194 09/08/2021    TRIG 172 (H) 09/08/2021    HDL 54 09/08/2021    VLDL 30 09/08/2021     (H) 09/08/2021    TSH 1.560 09/08/2021    FREET4 1.11 09/08/2021        Lab Results   Component Value Date    HGBA1C 6.4 (H) 09/08/2021    HGBA1C 6.0 (H) 05/21/2021    HGBA1C 6.1 (H) 12/09/2020    HGBA1C 6.6 (H) 07/27/2020                  Assessment and Plan    Diagnoses and all orders for this visit:    1. Type 2 diabetes mellitus without complication, without long-term current use of insulin (CMS/formerly [facility]) (Primary)  -     Hemoglobin A1c; Future  -     Comprehensive Metabolic Panel; Future    2. Acquired hypothyroidism    3. Mixed hyperlipidemia    4. Essential hypertension    5. Cough  -     COVID-19,[facility] ROUTINE, NP/OP SWAB IN TRANSPORT MEDIA OR ESWAB 72 HR TAT - Swab, Nasopharynx    6. Seasonal allergic rhinitis due to pollen    7. Sinus congestion  -     COVID-19,[facility] ROUTINE, NP/OP SWAB IN TRANSPORT MEDIA OR ESWAB 72 HR TAT - Swab, Nasopharynx    Diabetes increased A1c to 6.4 still excellent control did remind to follow a controlled carbohydrate, low concentrated sweet diet.  Hypothyroidism, at goal, continue same dose Synthroid 88 mcg daily  Cholesterol continue Crestor, hypertension goal continue tamsulosin  Advised to continue to treat allergies with Nasonex and over-the-counter antihistamine, add in Mucinex DM for the cough and sinus congestion to thin mucus    There are no discontinued medications.      Follow Up     Return in about 3 months (around 12/15/2021) for Recheck, diabetes etc, labs prior seperate visit for  wellness exam.    Patient was given instructions and counseling regarding her condition or for health maintenance advice. Please see specific information pulled into the AVS if appropriate.

## 2021-09-17 LAB
LABCORP SARS-COV-2, NAA 2 DAY TAT: NORMAL
SARS-COV-2 RNA RESP QL NAA+PROBE: NOT DETECTED

## 2021-09-22 RX ORDER — AZITHROMYCIN 250 MG/1
TABLET, FILM COATED ORAL
Qty: 6 TABLET | Refills: 0 | Status: SHIPPED | OUTPATIENT
Start: 2021-09-22 | End: 2022-05-23

## 2021-09-22 RX ORDER — GUAIFENESIN AND DEXTROMETHORPHAN HYDROBROMIDE 600; 30 MG/1; MG/1
1 TABLET, EXTENDED RELEASE ORAL 2 TIMES DAILY PRN
Qty: 20 TABLET | Refills: 1 | Status: SHIPPED | OUTPATIENT
Start: 2021-09-22

## 2021-10-25 DIAGNOSIS — E11.9 TYPE 2 DIABETES MELLITUS WITHOUT COMPLICATION, WITHOUT LONG-TERM CURRENT USE OF INSULIN (HCC): ICD-10-CM

## 2021-10-26 RX ORDER — DULAGLUTIDE 0.75 MG/.5ML
0.75 INJECTION, SOLUTION SUBCUTANEOUS WEEKLY
Qty: 8 ML | Refills: 0 | Status: SHIPPED | OUTPATIENT
Start: 2021-10-26 | End: 2022-01-14 | Stop reason: SDUPTHER

## 2021-10-28 ENCOUNTER — TELEPHONE (OUTPATIENT)
Dept: FAMILY MEDICINE CLINIC | Facility: CLINIC | Age: 66
End: 2021-10-28

## 2021-10-28 NOTE — TELEPHONE ENCOUNTER
Patient called stating her trulicity has not come in the mail. She is completely out and is requesting a short term supply be  Sent to  [facility].

## 2021-10-29 NOTE — TELEPHONE ENCOUNTER
It was actually sent to the [facility] pharmacy to begin with, if she can pick this up now I will change her pharmacy for the future. Attempted to call her, no answer.

## 2021-11-02 ENCOUNTER — TELEPHONE (OUTPATIENT)
Dept: FAMILY MEDICINE CLINIC | Facility: CLINIC | Age: 66
End: 2021-11-02

## 2021-11-02 ENCOUNTER — PATIENT MESSAGE (OUTPATIENT)
Dept: FAMILY MEDICINE CLINIC | Facility: CLINIC | Age: 66
End: 2021-11-02

## 2021-11-02 NOTE — TELEPHONE ENCOUNTER
----- Message from Johanna Swan sent at 11/2/2021 12:26 PM EDT -----  Regarding: Question regarding CBC WITH DIFFERENTIAL  Dr Collins,   I was just wondering if I would be a candidate for iron transfusion and a B 12 shot? I’ve had those before and they really helped, made me feel so much better. We are taking a vacation to [place] on November 11 and I wondered if I could possibly go to my oncologist Dr Griffin,  before then? I don’t know if you would consider my numbers low enough for that?  Thank you for your time and consideration,   Dennis Swan  [phone]

## 2021-11-02 NOTE — TELEPHONE ENCOUNTER
I do not see a CBC in your chart since May 2021, it was completely normal, you were not anemic at that time.  I do not see a B12 level, iron/ferritin level in the recent past.  Unless something has happened since May, you would not be a candidate for either of these treatments as not only do have to have a low vitamin B 12 level for a B12 injection or a low iron level for an iron infusion, you also have to have anemia with a hemoglobin typically below 8 but weeks below 10.  If there is something that Dr. Ramirez would know that I am not aware of you can contact his office for additional advice.

## 2021-11-09 ENCOUNTER — TELEPHONE (OUTPATIENT)
Dept: FAMILY MEDICINE CLINIC | Facility: CLINIC | Age: 66
End: 2021-11-09

## 2021-11-09 DIAGNOSIS — E11.9 TYPE 2 DIABETES MELLITUS WITHOUT COMPLICATION, WITHOUT LONG-TERM CURRENT USE OF INSULIN (HCC): Primary | ICD-10-CM

## 2021-11-09 RX ORDER — BLOOD SUGAR DIAGNOSTIC
STRIP MISCELLANEOUS
Qty: 50 EACH | Refills: 12 | Status: SHIPPED | OUTPATIENT
Start: 2021-11-09

## 2021-11-09 RX ORDER — BLOOD-GLUCOSE METER
1 EACH MISCELLANEOUS ONCE
Qty: 1 KIT | Refills: 0 | Status: SHIPPED | OUTPATIENT
Start: 2021-11-09 | End: 2021-11-09

## 2021-11-09 NOTE — TELEPHONE ENCOUNTER
----- Message from Johanna Swan sent at 11/8/2021 11:32 PM EST -----  Regarding: New meter  Dr Collins,       Would  you send over a prescription for a new meter for me to [facility] please. I would like one like the one that you prescribed for Will,  it’s a OneTouch verio.  I’d like to take it with me when we go on vacation this week. I’m pretty sure I haven’t gotten a new one for quite a few years. I appreciate everything you do for us.       Have a wonderful Thanksgiving!!!    Thank you,   Dennis Swan  [phone]

## 2021-11-09 NOTE — TELEPHONE ENCOUNTER
I will send this to Dr. Silverio to be sure she can fill that for you, be sure to keep your appointment in January. What Pharmacy would you like this called in to?

## 2021-11-24 ENCOUNTER — TELEPHONE (OUTPATIENT)
Dept: FAMILY MEDICINE CLINIC | Facility: CLINIC | Age: 66
End: 2021-11-24

## 2021-11-24 ENCOUNTER — LAB (OUTPATIENT)
Dept: LAB | Facility: HOSPITAL | Age: 66
End: 2021-11-24

## 2021-11-24 DIAGNOSIS — Z11.52 ENCOUNTER FOR SCREENING FOR COVID-19: Primary | ICD-10-CM

## 2021-11-24 LAB — SARS-COV-2 ORF1AB RESP QL NAA+PROBE: NOT DETECTED

## 2021-11-24 PROCEDURE — C9803 HOPD COVID-19 SPEC COLLECT: HCPCS

## 2021-11-24 PROCEDURE — U0004 COV-19 TEST NON-CDC HGH THRU: HCPCS

## 2021-11-24 NOTE — TELEPHONE ENCOUNTER
Patient called stating that her sister is requiring she get Covid tested before she can visit with her Mom because she just got back from [place]. She wants this done at [facility] on [facility] but they told her she would need an order from us to do so. I know how to do that and can do it with your approval. Patient has no symptoms.

## 2022-01-03 DIAGNOSIS — Z79.4 TYPE 2 DIABETES MELLITUS WITHOUT COMPLICATION, WITH LONG-TERM CURRENT USE OF INSULIN: ICD-10-CM

## 2022-01-03 DIAGNOSIS — E11.9 TYPE 2 DIABETES MELLITUS WITHOUT COMPLICATION, WITH LONG-TERM CURRENT USE OF INSULIN: ICD-10-CM

## 2022-01-04 RX ORDER — BLOOD SUGAR DIAGNOSTIC
STRIP MISCELLANEOUS
Qty: 100 EACH | Refills: 3 | Status: SHIPPED | OUTPATIENT
Start: 2022-01-04 | End: 2022-01-06 | Stop reason: SDUPTHER

## 2022-01-06 DIAGNOSIS — I10 ESSENTIAL HYPERTENSION: ICD-10-CM

## 2022-01-06 DIAGNOSIS — Z79.4 TYPE 2 DIABETES MELLITUS WITHOUT COMPLICATION, WITH LONG-TERM CURRENT USE OF INSULIN: ICD-10-CM

## 2022-01-06 DIAGNOSIS — E11.9 TYPE 2 DIABETES MELLITUS WITHOUT COMPLICATION, WITH LONG-TERM CURRENT USE OF INSULIN: ICD-10-CM

## 2022-01-08 DIAGNOSIS — G43.809 OTHER MIGRAINE WITHOUT STATUS MIGRAINOSUS, NOT INTRACTABLE: ICD-10-CM

## 2022-01-11 RX ORDER — BLOOD SUGAR DIAGNOSTIC
STRIP MISCELLANEOUS
Qty: 100 EACH | Refills: 3 | Status: SHIPPED | OUTPATIENT
Start: 2022-01-11

## 2022-01-11 RX ORDER — RIZATRIPTAN BENZOATE 10 MG/1
10 TABLET ORAL ONCE AS NEEDED
Qty: 9 TABLET | Refills: 5 | Status: SHIPPED | OUTPATIENT
Start: 2022-01-11

## 2022-01-11 RX ORDER — BENAZEPRIL HYDROCHLORIDE 10 MG/1
10 TABLET ORAL DAILY
Qty: 90 TABLET | Refills: 3 | Status: SHIPPED | OUTPATIENT
Start: 2022-01-11 | End: 2023-01-01

## 2022-01-12 ENCOUNTER — TELEPHONE (OUTPATIENT)
Dept: FAMILY MEDICINE CLINIC | Facility: CLINIC | Age: 67
End: 2022-01-12

## 2022-01-12 DIAGNOSIS — R11.0 NAUSEA: Primary | ICD-10-CM

## 2022-01-12 NOTE — TELEPHONE ENCOUNTER
Patient &  have appts Friday. Suppose to get labs prior. However, they were just informed they were exposed to covid on Monday. Should they go on & get labs? Should they keep Friday's appt? Should they reschedule?

## 2022-01-13 RX ORDER — ONDANSETRON HYDROCHLORIDE 8 MG/1
8 TABLET, FILM COATED ORAL EVERY 8 HOURS PRN
Qty: 30 TABLET | Refills: 0 | Status: SHIPPED | OUTPATIENT
Start: 2022-01-13

## 2022-01-13 NOTE — TELEPHONE ENCOUNTER
Called and spoke with patient her and her  are going to go get tested today and come in some other time.

## 2022-01-14 DIAGNOSIS — E11.9 TYPE 2 DIABETES MELLITUS WITHOUT COMPLICATION, WITHOUT LONG-TERM CURRENT USE OF INSULIN: ICD-10-CM

## 2022-01-14 RX ORDER — DULAGLUTIDE 0.75 MG/.5ML
0.75 INJECTION, SOLUTION SUBCUTANEOUS WEEKLY
Qty: 8 ML | Refills: 0 | Status: SHIPPED | OUTPATIENT
Start: 2022-01-14 | End: 2022-03-18 | Stop reason: SDUPTHER

## 2022-01-14 NOTE — TELEPHONE ENCOUNTER
Spoke with patient as she states that her and her  are not coming in today that everyone in their house is sick and will call to make another appointment letter.

## 2022-02-01 ENCOUNTER — TELEPHONE (OUTPATIENT)
Dept: FAMILY MEDICINE CLINIC | Facility: CLINIC | Age: 67
End: 2022-02-01

## 2022-02-01 ENCOUNTER — TELEHEALTH PROVIDED IN PATIENT'S HOME (AMBULATORY)
Dept: URBAN - METROPOLITAN AREA TELEHEALTH 4 | Facility: TELEHEALTH | Age: 67
End: 2022-02-01

## 2022-02-01 VITALS — HEIGHT: 63 IN

## 2022-02-01 DIAGNOSIS — R15.0 INCOMPLETE DEFECATION: ICD-10-CM

## 2022-02-01 DIAGNOSIS — K59.00 CONSTIPATION, UNSPECIFIED: ICD-10-CM

## 2022-02-01 DIAGNOSIS — K21.9 GASTRO-ESOPHAGEAL REFLUX DISEASE WITHOUT ESOPHAGITIS: ICD-10-CM

## 2022-02-01 PROCEDURE — 99212 OFFICE O/P EST SF 10 MIN: CPT | Mod: 95 | Performed by: INTERNAL MEDICINE

## 2022-02-01 RX ORDER — ESOMEPRAZOLE MAGNESIUM 40 MG/1
CAPSULE, DELAYED RELEASE ORAL
Qty: 180 | Refills: 4 | Status: ACTIVE

## 2022-02-23 ENCOUNTER — OFFICE VISIT (OUTPATIENT)
Dept: FAMILY MEDICINE CLINIC | Facility: CLINIC | Age: 67
End: 2022-02-23

## 2022-02-23 ENCOUNTER — TELEPHONE (OUTPATIENT)
Dept: FAMILY MEDICINE CLINIC | Facility: CLINIC | Age: 67
End: 2022-02-23

## 2022-02-23 VITALS
BODY MASS INDEX: 36.14 KG/M2 | WEIGHT: 204 LBS | OXYGEN SATURATION: 96 % | HEART RATE: 101 BPM | TEMPERATURE: 97.3 F | DIASTOLIC BLOOD PRESSURE: 80 MMHG | HEIGHT: 63 IN | SYSTOLIC BLOOD PRESSURE: 140 MMHG | RESPIRATION RATE: 18 BRPM

## 2022-02-23 DIAGNOSIS — E78.2 MIXED HYPERLIPIDEMIA: ICD-10-CM

## 2022-02-23 DIAGNOSIS — M54.42 CHRONIC BILATERAL LOW BACK PAIN WITH BILATERAL SCIATICA: ICD-10-CM

## 2022-02-23 DIAGNOSIS — M51.37 DEGENERATION OF LUMBAR OR LUMBOSACRAL INTERVERTEBRAL DISC: ICD-10-CM

## 2022-02-23 DIAGNOSIS — G89.29 CHRONIC BILATERAL LOW BACK PAIN WITH BILATERAL SCIATICA: ICD-10-CM

## 2022-02-23 DIAGNOSIS — M54.41 CHRONIC BILATERAL LOW BACK PAIN WITH BILATERAL SCIATICA: ICD-10-CM

## 2022-02-23 DIAGNOSIS — G89.29 CHRONIC BILATERAL THORACIC BACK PAIN: ICD-10-CM

## 2022-02-23 DIAGNOSIS — M48.04 SPINAL STENOSIS OF THORACIC REGION: ICD-10-CM

## 2022-02-23 DIAGNOSIS — M54.6 CHRONIC BILATERAL THORACIC BACK PAIN: ICD-10-CM

## 2022-02-23 DIAGNOSIS — I10 PRIMARY HYPERTENSION: ICD-10-CM

## 2022-02-23 DIAGNOSIS — M47.12 CERVICAL SPONDYLOSIS WITH MYELOPATHY: ICD-10-CM

## 2022-02-23 DIAGNOSIS — E03.9 ACQUIRED HYPOTHYROIDISM: ICD-10-CM

## 2022-02-23 DIAGNOSIS — R00.2 PALPITATIONS: ICD-10-CM

## 2022-02-23 DIAGNOSIS — M48.062 SPINAL STENOSIS OF LUMBAR REGION WITH NEUROGENIC CLAUDICATION: ICD-10-CM

## 2022-02-23 DIAGNOSIS — E11.9 TYPE 2 DIABETES MELLITUS WITHOUT COMPLICATION, WITHOUT LONG-TERM CURRENT USE OF INSULIN: Primary | ICD-10-CM

## 2022-02-23 PROCEDURE — 99214 OFFICE O/P EST MOD 30 MIN: CPT | Performed by: FAMILY MEDICINE

## 2022-02-23 PROCEDURE — 93000 ELECTROCARDIOGRAM COMPLETE: CPT | Performed by: FAMILY MEDICINE

## 2022-02-23 RX ORDER — METOPROLOL SUCCINATE 25 MG/1
25 TABLET, EXTENDED RELEASE ORAL DAILY
Qty: 90 TABLET | Refills: 3 | Status: SHIPPED | OUTPATIENT
Start: 2022-02-23

## 2022-03-14 DIAGNOSIS — E11.9 TYPE 2 DIABETES MELLITUS WITHOUT COMPLICATION, WITHOUT LONG-TERM CURRENT USE OF INSULIN: ICD-10-CM

## 2022-03-14 RX ORDER — DULAGLUTIDE 0.75 MG/.5ML
0.75 INJECTION, SOLUTION SUBCUTANEOUS WEEKLY
Qty: 8 ML | Refills: 0 | OUTPATIENT
Start: 2022-03-14

## 2022-03-14 RX ORDER — DULAGLUTIDE 0.75 MG/.5ML
INJECTION, SOLUTION SUBCUTANEOUS
Qty: 2 ML | Refills: 12 | OUTPATIENT
Start: 2022-03-14

## 2022-03-18 DIAGNOSIS — E11.9 TYPE 2 DIABETES MELLITUS WITHOUT COMPLICATION, WITHOUT LONG-TERM CURRENT USE OF INSULIN: ICD-10-CM

## 2022-03-18 RX ORDER — DULAGLUTIDE 0.75 MG/.5ML
0.75 INJECTION, SOLUTION SUBCUTANEOUS WEEKLY
Qty: 8 ML | Refills: 0 | Status: SHIPPED | OUTPATIENT
Start: 2022-03-18 | End: 2022-05-23 | Stop reason: SDUPTHER

## 2022-05-02 ENCOUNTER — TELEPHONE (OUTPATIENT)
Dept: FAMILY MEDICINE CLINIC | Facility: CLINIC | Age: 67
End: 2022-05-02

## 2022-05-02 ENCOUNTER — PATIENT MESSAGE (OUTPATIENT)
Dept: FAMILY MEDICINE CLINIC | Facility: CLINIC | Age: 67
End: 2022-05-02

## 2022-05-02 NOTE — TELEPHONE ENCOUNTER
----- Message from Johanna Swan sent at 5/2/2022  2:18 PM EDT -----  Regarding: RSV study At [facility]  Dr Collins,    I have been contacted by [facility] to participate in a vaccine study that last for two RSV seasons. I would be given a vaccine for the RSV Or a placebo. You are my healthcare provider and I need to know if you Would consider it safe or should I opt out, in your  Professional opinion?     Thank you for your time,  Dennis Swan  [phone]

## 2022-05-02 NOTE — TELEPHONE ENCOUNTER
Please inform patient that I see no reason she should not consider participating in the trial for a possible RSV immunization.

## 2022-05-09 ENCOUNTER — TELEPHONE (OUTPATIENT)
Dept: FAMILY MEDICINE CLINIC | Facility: CLINIC | Age: 67
End: 2022-05-09

## 2022-05-09 DIAGNOSIS — M25.561 ACUTE PAIN OF BOTH KNEES: Primary | ICD-10-CM

## 2022-05-09 DIAGNOSIS — M25.562 ACUTE PAIN OF BOTH KNEES: Primary | ICD-10-CM

## 2022-05-09 NOTE — TELEPHONE ENCOUNTER
Malia with [facility] called requesting referral for bilateral knee pain and possible injections. Patient is in office now. I advised that I would send the message back and discuss with the MA but any  Requests for referrals can take up to 48 hours for response. Fax [phone].

## 2022-05-10 NOTE — TELEPHONE ENCOUNTER
Could not find this place in our workque to drop in. Unable to open this website on google it is blocked by [employer]. I called the facility and asked if this is  an orthopedist referral or physical therapy. She did not know and put me on hold. She came back and said they are orthopedist. I asked who the providers are in their office and the call lost connection. I have sent a message to our patient to ensure she wants this referral before proceeding any further.

## 2022-05-16 ENCOUNTER — TELEPHONE (OUTPATIENT)
Dept: FAMILY MEDICINE CLINIC | Facility: CLINIC | Age: 67
End: 2022-05-16

## 2022-05-16 DIAGNOSIS — M17.9 OSTEOARTHRITIS OF KNEE, UNSPECIFIED LATERALITY, UNSPECIFIED OSTEOARTHRITIS TYPE: Primary | ICD-10-CM

## 2022-05-16 RX ORDER — PETROLATUM,WHITE/LANOLIN
1 OINTMENT (GRAM) TOPICAL 3 TIMES DAILY
Qty: 90 EACH | Refills: 12 | Status: SHIPPED | OUTPATIENT
Start: 2022-05-16

## 2022-05-16 NOTE — TELEPHONE ENCOUNTER
Called and spoke with Em From [facility], told her we cannot receive emails for xrays and she would have to fax any documents and imaging over.

## 2022-05-16 NOTE — TELEPHONE ENCOUNTER
Malia from [facility] calling into the office in regards to some x-rays that Dr. Silverio had needed on a patient.  She stated her office fax machine is down and wasn't sure if she needed to wait or if she could send these over email.  Mentioned to Malia I wasn't sure and I would send a message back for nurse to review.

## 2022-05-16 NOTE — TELEPHONE ENCOUNTER
----- Message from Johanna Swan sent at 5/13/2022  3:39 PM EDT -----  Regarding: Glucosamine with MSM      Dr Collins,   I went to my appointment at [facility] and they want me to ask you if you would prescribe Glucosamine with MSM for me. They said that my insurance will pay for it if you prescribe it. They also said I’m a perfect candidate for the gel and the PRP.  Thank you so much for everything you do for me.

## 2022-05-16 NOTE — TELEPHONE ENCOUNTER
I have sent a prescription for the over-the-counter supplement glucosamine-chondroitin-MSM to take 1 tablet 3 times daily to [facility] pharmacy.    And yes, email is not an approved platform for receiving records.  If records cannot be faxed  Patient could upload documentations through [employer].

## 2022-05-23 ENCOUNTER — OFFICE VISIT (OUTPATIENT)
Dept: FAMILY MEDICINE CLINIC | Facility: CLINIC | Age: 67
End: 2022-05-23

## 2022-05-23 VITALS
SYSTOLIC BLOOD PRESSURE: 130 MMHG | OXYGEN SATURATION: 91 % | BODY MASS INDEX: 35.75 KG/M2 | TEMPERATURE: 97.8 F | DIASTOLIC BLOOD PRESSURE: 78 MMHG | HEIGHT: 63 IN | WEIGHT: 201.8 LBS | HEART RATE: 95 BPM | RESPIRATION RATE: 18 BRPM

## 2022-05-23 DIAGNOSIS — Z12.4 SCREENING FOR MALIGNANT NEOPLASM OF CERVIX: ICD-10-CM

## 2022-05-23 DIAGNOSIS — E11.9 TYPE 2 DIABETES MELLITUS WITHOUT COMPLICATION, WITHOUT LONG-TERM CURRENT USE OF INSULIN: ICD-10-CM

## 2022-05-23 DIAGNOSIS — E66.01 CLASS 2 SEVERE OBESITY DUE TO EXCESS CALORIES WITH SERIOUS COMORBIDITY AND BODY MASS INDEX (BMI) OF 36.0 TO 36.9 IN ADULT: ICD-10-CM

## 2022-05-23 DIAGNOSIS — Z13.820 SCREENING FOR OSTEOPOROSIS: ICD-10-CM

## 2022-05-23 DIAGNOSIS — Z12.31 ENCOUNTER FOR SCREENING MAMMOGRAM FOR MALIGNANT NEOPLASM OF BREAST: ICD-10-CM

## 2022-05-23 DIAGNOSIS — Z78.0 POST-MENOPAUSAL: ICD-10-CM

## 2022-05-23 DIAGNOSIS — I10 PRIMARY HYPERTENSION: ICD-10-CM

## 2022-05-23 DIAGNOSIS — E78.2 MIXED HYPERLIPIDEMIA: ICD-10-CM

## 2022-05-23 DIAGNOSIS — Z00.00 INITIAL MEDICARE ANNUAL WELLNESS VISIT: Primary | ICD-10-CM

## 2022-05-23 DIAGNOSIS — E03.9 ACQUIRED HYPOTHYROIDISM: ICD-10-CM

## 2022-05-23 LAB
POC CREATININE URINE: 0
POC MICROALBUMIN URINE: 20

## 2022-05-23 PROCEDURE — G0439 PPPS, SUBSEQ VISIT: HCPCS | Performed by: FAMILY MEDICINE

## 2022-05-23 PROCEDURE — 1159F MED LIST DOCD IN RCRD: CPT | Performed by: FAMILY MEDICINE

## 2022-05-23 PROCEDURE — 82044 UR ALBUMIN SEMIQUANTITATIVE: CPT | Performed by: FAMILY MEDICINE

## 2022-05-23 PROCEDURE — 99214 OFFICE O/P EST MOD 30 MIN: CPT | Performed by: FAMILY MEDICINE

## 2022-05-23 RX ORDER — DULAGLUTIDE 0.75 MG/.5ML
0.75 INJECTION, SOLUTION SUBCUTANEOUS WEEKLY
Qty: 6 ML | Refills: 0 | Status: SHIPPED | OUTPATIENT
Start: 2022-05-23 | End: 2022-06-13

## 2022-05-23 NOTE — PROGRESS NOTES
The ABCs of the Annual Wellness Visit  Initial Medicare Wellness Visit    Chief Complaint   Patient presents with   • Medicare Wellness-subsequent   and follow up on diabetes, HTN and chronic medical conditions  Had labs last week  Subjective   History of Present Illness:  Johanna Swan is a 66 y.o. female who presents for an Initial Medicare Wellness Visit.    Had hysterectomy with BSO around 2014  Do see eye dr Sara Hare at [facility] annually.     The following portions of the patient's history were reviewed and   updated as appropriate: allergies, current medications, past family history, past medical history, past social history, past surgical history and problem list.     Compared to one year ago, the patient feels her physical   health is worse.  Increased knee pain limiting mobility more Going to knee [employer] have had a platelet injection.   Going to use hyaluronic acid injections    Compared to one year ago, the patient feels her mental   health is the same.    Recent Hospitalizations:  She was not admitted to the hospital during the last year.       Current Medical Providers:  Patient Care Team:  Spring Silverio MD as PCP - General (Family Medicine)  Spring Silverio MD as PCP - Family Medicine    Outpatient Medications Prior to Visit   Medication Sig Dispense Refill   • albuterol sulfate  (90 Base) MCG/ACT inhaler Inhale 2 puffs Every 6 (Six) Hours As Needed for Wheezing. 3 inhaler 3   • Amitiza 24 MCG capsule Take 1 capsule by mouth 2 (Two) Times a Day.  3   • benazepril (LOTENSIN) 10 MG tablet Take 1 tablet by mouth Daily. 90 tablet 3   • dicyclomine (BENTYL) 10 MG capsule Take 1 capsule by mouth 4 (Four) Times a Day Before Meals & at Bedtime. 60 capsule 0   • DULoxetine (CYMBALTA) 60 MG capsule Take 1 capsule by mouth Daily.     • Erenumab-aooe (AIMOVIG) 140 MG/ML prefilled syringe Inject 1 mg under the skin into the appropriate area as directed Every 30 (Thirty)  Days.     • esomeprazole (nexIUM) 40 MG capsule Take 1 capsule by mouth Daily.     • glucose blood (OneTouch Verio) test strip Use as instructed to check glucose once daily 50 each 12   • glucose blood (OneTouch Verio) test strip Use as instructed 100 each 3   • guaifenesin-dextromethorphan (MUCINEX DM)  MG tablet sustained-release 12 hour tablet Take 1 tablet by mouth 2 (Two) Times a Day As Needed (cough and congestion). 20 tablet 1   • HYDROcodone-acetaminophen (NORCO) 5-325 MG per tablet Take 1 tablet by mouth 2 (Two) Times a Day As Needed.     • Lyrica 100 MG capsule Take 100 mg by mouth 2 (Two) Times a Day.     • metoprolol succinate XL (TOPROL-XL) 25 MG 24 hr tablet Take 1 tablet by mouth Daily. 90 tablet 3   • rosuvastatin (CRESTOR) 5 MG tablet TAKE 1 TABLET DAILY 90 tablet 3   • Synthroid 88 MCG tablet TAKE 1 TABLET DAILY 90 tablet 3   • tamsulosin (FLOMAX) 0.4 MG capsule 24 hr capsule Take 1 capsule by mouth Daily.  11   • tiZANidine (ZANAFLEX) 4 MG tablet Take 4 mg by mouth Every Night.     • Dulaglutide (Trulicity) 0.75 MG/0.5ML solution pen-injector Inject 0.75 mg into the appropriate area of the skin as directed by provider 1 (One) Time Per Week. 8 mL 0   • azithromycin (Zithromax Z-Carrillo) 250 MG tablet Take 2 tablets by mouth the first day, then 1 tablet daily for 4 days. 6 tablet 0   • Glucosamine-Chondroitin-MSM ([employer] Glucosamine-Chondroit-MSM) 500-400-167 MG tablet Take 1 tablet by mouth 3 (Three) Times a Day. 90 each 12   • lidocaine (XYLOCAINE) 5 % ointment APPLY .5 G OF CREAM TO AFFECTED AREA NOT EXCEEDING MORE THEN 13 TUBE PER DAY.     • Lidocaine-Tetracaine 7-7 % cream APPLY TO AFFECTED AREA TWICE DAILY AS NEEDED     • mometasone (Elocon) 0.1 % cream Apply to rash twice daily as needed 50 g 1   • mometasone (NASONEX) 50 MCG/ACT nasal spray 2 sprays into the nostril(s) as directed by provider Daily. 3 each 3   • ondansetron (Zofran) 8 MG tablet Take 1 tablet by mouth Every 8 (Eight) Hours As  Needed for Nausea or Vomiting. 30 tablet 0   • rizatriptan (MAXALT) 10 MG tablet Take 1 tablet by mouth 1 (One) Time As Needed for Migraine (May repeat in 2 hours if needed for persistent headache). 9 tablet 5     No facility-administered medications prior to visit.       Opioid medication/s are on active medication list.  and I have evaluated her active treatment plan and pain score trends (see table).  Vitals:    05/23/22 1034   PainSc:   4     I have reviewed the chart for potential of high risk medication and harmful drug interactions in the elderly.            Aspirin is not on active medication list.  Aspirin use is not indicated based on review of current medical condition/s. Risk of harm outweighs potential benefits.  .    Patient Active Problem List   Diagnosis   • Acute stress reaction   • Arthritis   • Spinal stenosis of cervical region   • Cyst, eyelid   • Degeneration of lumbar or lumbosacral intervertebral disc   • Degenerative disc disease, cervical   • Herniated cervical disc   • Esophageal stricture   • Family history of diabetes mellitus   • Hair loss   • Hematuria   • Hyperlipidemia   • Hypertension   • Hypothyroidism   • Knee pain, right   • Class 2 severe obesity due to excess calories with serious comorbidity and body mass index (BMI) of 36.0 to 36.9 in adult ([facility])   • Osteoarthritis of knee   • Restless leg syndrome   • Cervicalgia   • Thoracic back pain   • Acid reflux disease   • Allergic rhinitis   • Chronic joint pain   • Hernia, diaphragmatic   • History of ulcer disease   • Migraine   • Nerve root disorder   • Syringomyelia, acquired ([facility])   • Spinal stenosis of thoracic region   • Cervical spondylosis with myelopathy   • Osteoarthritis of carpometacarpal joint of thumb   • Lumbar spondylosis with myelopathy   • Colon polyps   • GERD (gastroesophageal reflux disease)   • IBS (irritable bowel syndrome)   • Spinal stenosis of lumbar region with neurogenic claudication   • Type 2 diabetes  "mellitus without complication, without long-term current use of insulin (HCC)   • History of colon polyps   • Chronic bilateral low back pain with bilateral sciatica   • Pain in both feet   • Bunion of great toe of left foot   • Plantar fasciitis     ATTENTION  What is the year: correct  What is the month of the year: correct  What is the day of the week?: correct  What is the date?: incorrect  MEMORY  Repeat address three times, only score third attempt: [address], [place]: 7  HOW MANY ANIMALS DID THE PATIENT NAME  Verbal Fluency -- Animal Names (0-25): 22+  CLOCK DRAWING  Clock Drawing: All Correct  MEMORY RECALL  Tell me what you remember about that name and address we were repeating at the beginnin  ACE TOTAL SCORE  Total ACE Score - <25/30 strongly suggests cognitive impairment; <21/30 almost certainly shows dementia: 29        Advance Care Planning  Advance Directive is on file.  ACP discussion was held with the patient during this visit. Patient has an advance directive in EMR which is still valid.           Objective       Vitals:    22 1034   BP: 130/78   BP Location: Right arm   Patient Position: Sitting   Cuff Size: Large Adult   Pulse: 95   Resp: 18   Temp: 97.8 °F (36.6 °C)   TempSrc: Temporal   SpO2: 91%   Weight: 91.5 kg (201 lb 12.8 oz)   Height: 160 cm (63\")   PainSc:   4     Class 2 Severe Obesity (BMI >=35 and <=39.9). Obesity-related health conditions include the following: hypertension and diabetes mellitus. Obesity is improving with lifestyle modifications. BMI is is above average; BMI management plan is completed. We discussed low calorie, low carb based diet program, portion control and increasing exercise.  Does the patient have evidence of cognitive impairment? No    Physical Exam  Vitals and nursing note reviewed.   Constitutional:       General: She is not in acute distress.     Appearance: She is well-developed.   HENT:      Head: Normocephalic " and atraumatic.   Cardiovascular:      Rate and Rhythm: Normal rate and regular rhythm.      Heart sounds: No murmur heard.  Pulmonary:      Effort: Pulmonary effort is normal.      Breath sounds: Normal breath sounds. No wheezing.   Musculoskeletal:         General: Normal range of motion.   Skin:     General: Skin is warm and dry.      Findings: No rash.   Neurological:      Mental Status: She is alert and oriented to person, place, and time.       Lab Results   Component Value Date    CHLPL 175 2022    TRIG 155 (H) 2022    HDL 49 2022    LDL 99 2022    VLDL 27 2022    HGBA1C 6.5 (H) 2022          HEALTH RISK ASSESSMENT    Smoking Status:  Social History     Tobacco Use   Smoking Status Former Smoker   • Packs/day: 1.50   • Years: 20.00   • Pack years: 30.00   • Types: Cigarettes   • Start date:    • Quit date:    • Years since quittin.4   Smokeless Tobacco Never Used     Alcohol Consumption:  Social History     Substance and Sexual Activity   Alcohol Use No     Fall Risk Screen:    STEPHANEADI Fall Risk Assessment was completed, and patient is at HIGH risk for falls. Assessment completed on:2022    Depression Screen:   PHQ-2/PHQ-9 Depression Screening 2022   Retired PHQ-9 Total Score -   Retired Total Score -   Little Interest or Pleasure in Doing Things 0-->not at all   Feeling Down, Depressed or Hopeless 0-->not at all   Trouble Falling or Staying Asleep, or Sleeping Too Much -   Feeling Tired or Having Little Energy -   Poor Appetite or Overeating -   Feeling Bad about Yourself - or that You are a Failure or Have Let Yourself or Your Family Down -   Trouble Concentrating on Things, Such as Reading the Newspaper or Watching Television -   Moving or Speaking So Slowly that Other People Could Have Noticed? Or the Opposite - Being So Fidgety -   Thoughts that You Would be Better Off Dead or of Hurting Yourself in Some Way -   PHQ-9: Brief Depression Severity  Measure Score 0   If You Checked Off Any Problems, How Difficult Have These Problems Made It For You to Do Your Work, Take Care of Things at Home, or Get Along with Other People? -       Health Habits and Functional and Cognitive Screening:  Functional & Cognitive Status 5/23/2022   Do you have difficulty preparing food and eating? No   Do you have difficulty bathing yourself, getting dressed or grooming yourself? No   Do you have difficulty using the toilet? No   Do you have difficulty moving around from place to place? No   Do you have trouble with steps or getting out of a bed or a chair? No   Current Diet Well Balanced Diet   Dental Exam Up to date   Eye Exam Up to date   Exercise (times per week) 2 times per week   Current Exercises Include Walking   Do you need help using the phone?  No   Are you deaf or do you have serious difficulty hearing?  No   Do you need help with transportation? No   Do you need help shopping? No   Do you need help preparing meals?  No   Do you need help with housework?  Yes   Do you need help with laundry? Yes   Do you need help taking your medications? No   Do you need help managing money? No   Do you ever drive or ride in a car without wearing a seat belt? No   Have you felt unusual stress, anger or loneliness in the last month? No   Who do you live with? Spouse   If you need help, do you have trouble finding someone available to you? No   Have you been bothered in the last four weeks by sexual problems? No   Do you have difficulty concentrating, remembering or making decisions? No       Age-appropriate Screening Schedule:  Refer to the list below for future screening recommendations based on patient's age, sex and/or medical conditions. Orders for these recommended tests are listed in the plan section. The patient has been provided with a written plan.    Health Maintenance   Topic Date Due   • ZOSTER VACCINE (3 of 3) 11/25/2018   • DXA SCAN  12/20/2020   • DIABETIC FOOT EXAM   05/21/2022   • DIABETIC EYE EXAM  07/28/2022   • MAMMOGRAM  07/31/2022   • INFLUENZA VACCINE  08/01/2022   • HEMOGLOBIN A1C  11/20/2022   • LIPID PANEL  05/20/2023   • URINE MICROALBUMIN  05/23/2023   • TDAP/TD VACCINES (2 - Td or Tdap) 07/15/2026   • PAP SMEAR  Discontinued            Assessment & Plan   CMS Preventative Services Quick Reference  Risk Factors Identified During Encounter  Immunizations Discussed/Encouraged (specific Immunizations; Influenza, Prevnar 20 (Pneumococcal 20-valent conjugate), Shingrix and COVID19  Obesity/Overweight   The above risks/problems have been discussed with the patient.  Follow up actions/plans if indicated are seen below in the Assessment/Plan Section.  Pertinent information has been shared with the patient in the After Visit Summary.    Diagnoses and all orders for this visit:    1. Initial Medicare annual wellness visit (Primary)    2. Primary hypertension    3. Encounter for screening mammogram for malignant neoplasm of breast  -     Mammo Screening Digital Tomosynthesis Bilateral With CAD; Future    4. Screening for malignant neoplasm of cervix    5. Post-menopausal  -     DEXA Bone Density Appendicular; Future    6. Class 2 severe obesity due to excess calories with serious comorbidity and body mass index (BMI) of 36.0 to 36.9 in adult ([facility])  Assessment & Plan:  Class 2 Severe Obesity (BMI >=35 and <=39.9). Obesity-related health conditions include the following: hypertension, diabetes mellitus, dyslipidemias and osteoarthritis. Obesity is worsening. BMI is is above average; BMI management plan is completed. We discussed portion control and increasing exercise.        7. Screening for osteoporosis  -     DEXA Bone Density Appendicular; Future    8. Type 2 diabetes mellitus without complication, without long-term current use of insulin ([facility])  -     POCT microalbumin  -     Dulaglutide (Trulicity) 0.75 MG/0.5ML solution pen-injector; Inject 0.75 mg into the appropriate area  of the skin as directed by provider 1 (One) Time Per Week.  Dispense: 6 mL; Refill: 0  -     Hemoglobin A1c; Future  -     Comprehensive Metabolic Panel; Future    9. Mixed hyperlipidemia    10. Acquired hypothyroidism    The patient was counseled regarding nutrition, physical activity, healthy weight, injury prevention, immunizations and preventative health screenings.  Ordering mammogram and DEXA scan as above.  Encouraged to get Shingrix at pharmacy after checking insurance coverage.  Also recommended COVID booster and recommend getting Prevnar 20 along with flu vaccination in the fall.    Diabetes hypothyroidism and hypertension at goal continue current medications    Follow Up:  Return in about 3 months (around 8/23/2022) for diabetes, with Labs.     An After Visit Summary and PPPS were made available to the patient.

## 2022-05-23 NOTE — ASSESSMENT & PLAN NOTE
Class 2 Severe Obesity (BMI >=35 and <=39.9). Obesity-related health conditions include the following: hypertension, diabetes mellitus, dyslipidemias and osteoarthritis. Obesity is worsening. BMI is is above average; BMI management plan is completed. We discussed portion control and increasing exercise.

## 2022-05-26 DIAGNOSIS — E03.9 ACQUIRED HYPOTHYROIDISM: ICD-10-CM

## 2022-05-26 RX ORDER — LEVOTHYROXINE SODIUM 88 UG/1
88 TABLET ORAL DAILY
Qty: 90 TABLET | Refills: 3 | Status: SHIPPED | OUTPATIENT
Start: 2022-05-26

## 2022-06-07 RX ORDER — ROSUVASTATIN CALCIUM 5 MG/1
TABLET, COATED ORAL
Qty: 90 TABLET | Refills: 3 | Status: SHIPPED | OUTPATIENT
Start: 2022-06-07

## 2022-06-12 DIAGNOSIS — E11.9 TYPE 2 DIABETES MELLITUS WITHOUT COMPLICATION, WITHOUT LONG-TERM CURRENT USE OF INSULIN: ICD-10-CM

## 2022-06-13 RX ORDER — DULAGLUTIDE 0.75 MG/.5ML
INJECTION, SOLUTION SUBCUTANEOUS
Qty: 4 ML | Refills: 0 | Status: SHIPPED | OUTPATIENT
Start: 2022-06-13 | End: 2022-06-15 | Stop reason: SDUPTHER

## 2022-06-15 DIAGNOSIS — E11.9 TYPE 2 DIABETES MELLITUS WITHOUT COMPLICATION, WITHOUT LONG-TERM CURRENT USE OF INSULIN: ICD-10-CM

## 2022-06-15 RX ORDER — DULAGLUTIDE 0.75 MG/.5ML
0.75 INJECTION, SOLUTION SUBCUTANEOUS WEEKLY
Qty: 4 PEN | Refills: 3 | Status: SHIPPED | OUTPATIENT
Start: 2022-06-15 | End: 2022-09-14 | Stop reason: DRUGHIGH

## 2022-08-01 ENCOUNTER — HOSPITAL ENCOUNTER (OUTPATIENT)
Dept: MAMMOGRAPHY | Facility: HOSPITAL | Age: 67
Discharge: HOME | End: 2022-08-01

## 2022-08-01 ENCOUNTER — HOSPITAL ENCOUNTER (OUTPATIENT)
Dept: BONE DENSITY | Facility: HOSPITAL | Age: 67
Discharge: HOME | End: 2022-08-01

## 2022-08-01 DIAGNOSIS — Z78.0 POST-MENOPAUSAL: ICD-10-CM

## 2022-08-01 DIAGNOSIS — Z13.820 SCREENING FOR OSTEOPOROSIS: ICD-10-CM

## 2022-08-01 DIAGNOSIS — Z12.31 ENCOUNTER FOR SCREENING MAMMOGRAM FOR MALIGNANT NEOPLASM OF BREAST: ICD-10-CM

## 2022-08-01 PROCEDURE — 77081 DXA BONE DENSITY APPENDICULR: CPT

## 2022-08-01 PROCEDURE — 77063 BREAST TOMOSYNTHESIS BI: CPT

## 2022-08-01 PROCEDURE — 77067 SCR MAMMO BI INCL CAD: CPT

## 2022-08-07 NOTE — PROGRESS NOTES
Sheri,  I have sent the following message to patient through [facility]:  Dennis,  Your bone scan shows osteopenia.  T score at  femur is -1.0, compared with scan at [facility] on December 20, 2018 this is a slight decrease from -0.4.  You need to be getting a total of 1200 to 1500 mg of calcium through diet and supplements as well as 600 to 800 IUs of vitamin D on a daily basis.  Should also be doing weight bearing exercises (anything other than swimming) to help improve bone density.  Should repeat in 2 years to watch for progression.  Spring Silverio MD

## 2022-09-14 ENCOUNTER — OFFICE VISIT (OUTPATIENT)
Dept: FAMILY MEDICINE CLINIC | Facility: CLINIC | Age: 67
End: 2022-09-14

## 2022-09-14 VITALS
HEART RATE: 101 BPM | SYSTOLIC BLOOD PRESSURE: 126 MMHG | HEIGHT: 63 IN | BODY MASS INDEX: 35.35 KG/M2 | TEMPERATURE: 98.4 F | DIASTOLIC BLOOD PRESSURE: 72 MMHG | RESPIRATION RATE: 18 BRPM | OXYGEN SATURATION: 94 % | WEIGHT: 199.5 LBS

## 2022-09-14 DIAGNOSIS — J30.1 SEASONAL ALLERGIC RHINITIS DUE TO POLLEN: ICD-10-CM

## 2022-09-14 DIAGNOSIS — J30.1 ALLERGIC RHINITIS DUE TO POLLEN, UNSPECIFIED SEASONALITY: ICD-10-CM

## 2022-09-14 DIAGNOSIS — E66.01 CLASS 2 SEVERE OBESITY DUE TO EXCESS CALORIES WITH SERIOUS COMORBIDITY AND BODY MASS INDEX (BMI) OF 35.0 TO 35.9 IN ADULT: ICD-10-CM

## 2022-09-14 DIAGNOSIS — E11.9 TYPE 2 DIABETES MELLITUS WITHOUT COMPLICATION, WITHOUT LONG-TERM CURRENT USE OF INSULIN: Primary | ICD-10-CM

## 2022-09-14 DIAGNOSIS — R09.81 SINUS CONGESTION: ICD-10-CM

## 2022-09-14 DIAGNOSIS — R05.9 COUGH: ICD-10-CM

## 2022-09-14 DIAGNOSIS — J34.2 NASAL SEPTAL DEVIATION: ICD-10-CM

## 2022-09-14 PROCEDURE — 99214 OFFICE O/P EST MOD 30 MIN: CPT | Performed by: FAMILY MEDICINE

## 2022-09-14 RX ORDER — ALBUTEROL SULFATE 90 UG/1
2 AEROSOL, METERED RESPIRATORY (INHALATION) EVERY 6 HOURS PRN
Qty: 18 G | Refills: 3 | Status: SHIPPED | OUTPATIENT
Start: 2022-09-14

## 2022-09-14 RX ORDER — MOMETASONE FUROATE 50 UG/1
2 SPRAY, METERED NASAL DAILY
Qty: 3 EACH | Refills: 3 | Status: SHIPPED | OUTPATIENT
Start: 2022-09-14

## 2022-09-14 RX ORDER — DULAGLUTIDE 1.5 MG/.5ML
1.5 INJECTION, SOLUTION SUBCUTANEOUS WEEKLY
Qty: 2 ML | Refills: 5 | Status: SHIPPED | OUTPATIENT
Start: 2022-09-14 | End: 2023-02-25 | Stop reason: SDUPTHER

## 2022-09-14 NOTE — PROGRESS NOTES
Chief Complaint  Diabetes     History of Present Illness  Johanna Swan presents today for diabetic follow up.  Current treatment is trulicity.  Patient does check blood sugar randomly.  Reports average is 114  She has had diabetic eye exam at [facility], Dr. Ledezma  Recent sinus drainage, was green is improving.now with ongoing drippy runny nose, no fever, no fever, no sinus pain or sore throat.  Used albuterol at hs for mild cough that worsens when lay down   Use zyrtec and  nasonex occasionally, but not recently.     A1C Last 3 Results    HGBA1C Last 3 Results 2/21/22 5/20/22 9/12/22   Hemoglobin A1C 6.3 (A) 6.5 (A) 6.4 (A)   (A) Abnormal value       Comments are available for some flowsheets but are not being displayed.                 Current Outpatient Medications on File Prior to Visit   Medication Sig   • Amitiza 24 MCG capsule Take 1 capsule by mouth 2 (Two) Times a Day.   • benazepril (LOTENSIN) 10 MG tablet Take 1 tablet by mouth Daily.   • dicyclomine (BENTYL) 10 MG capsule Take 1 capsule by mouth 4 (Four) Times a Day Before Meals & at Bedtime.   • DULoxetine (CYMBALTA) 60 MG capsule Take 1 capsule by mouth Daily.   • Erenumab-aooe (AIMOVIG) 140 MG/ML prefilled syringe Inject 1 mg under the skin into the appropriate area as directed Every 30 (Thirty) Days.   • esomeprazole (nexIUM) 40 MG capsule Take 1 capsule by mouth Daily.   • Glucosamine-Chondroitin-MSM ([facility] Glucosamine-Chondroit-MSM) 500-400-167 MG tablet Take 1 tablet by mouth 3 (Three) Times a Day.   • glucose blood (OneTouch Verio) test strip Use as instructed to check glucose once daily   • glucose blood (OneTouch Verio) test strip Use as instructed   • guaifenesin-dextromethorphan (MUCINEX DM)  MG tablet sustained-release 12 hour tablet Take 1 tablet by mouth 2 (Two) Times a Day As Needed (cough and congestion).   • HYDROcodone-acetaminophen (NORCO) 5-325 MG per tablet Take 1 tablet by mouth 2 (Two) Times a Day As Needed.   •  "levothyroxine (Synthroid) 88 MCG tablet Take 1 tablet by mouth Daily.   • lidocaine (XYLOCAINE) 5 % ointment APPLY .5 G OF CREAM TO AFFECTED AREA NOT EXCEEDING MORE THEN 13 TUBE PER DAY.   • Lidocaine-Tetracaine 7-7 % cream APPLY TO AFFECTED AREA TWICE DAILY AS NEEDED   • Lyrica 150 MG capsule Take 150 mg by mouth 2 (Two) Times a Day.   • metoprolol succinate XL (TOPROL-XL) 25 MG 24 hr tablet Take 1 tablet by mouth Daily.   • mometasone (Elocon) 0.1 % cream Apply to rash twice daily as needed   • ondansetron (Zofran) 8 MG tablet Take 1 tablet by mouth Every 8 (Eight) Hours As Needed for Nausea or Vomiting.   • rizatriptan (MAXALT) 10 MG tablet Take 1 tablet by mouth 1 (One) Time As Needed for Migraine (May repeat in 2 hours if needed for persistent headache).   • rosuvastatin (CRESTOR) 5 MG tablet TAKE 1 TABLET DAILY   • tamsulosin (FLOMAX) 0.4 MG capsule 24 hr capsule Take 1 capsule by mouth Daily.   • tiZANidine (ZANAFLEX) 4 MG tablet Take 4 mg by mouth Every Night.     No current facility-administered medications on file prior to visit.       Objective   Vital Signs:   /72 (BP Location: Right arm, Patient Position: Sitting, Cuff Size: Adult)   Pulse 101   Temp 98.4 °F (36.9 °C) (Temporal)   Resp 18   Ht 160 cm (63\")   Wt 90.5 kg (199 lb 8 oz)   SpO2 94%   BMI 35.34 kg/m²       Physical Exam  Vitals and nursing note reviewed.   Constitutional:       General: She is not in acute distress.     Appearance: She is well-developed.   HENT:      Head: Normocephalic and atraumatic.      Comments: There is mild tenderness to palpation of the frontal and maxillary sinuses bilaterally.  Nasal mucosa is pale pink and boggy/congested.  Septum is deviated nasal passages are narrow  There is cobblestoning of the posterior pharynx.  There is no erythema or exudate.  There is no cerumen impaction, external auditory canals and tympanic membranes are normal bilaterally, there is no middle ear " effusion.    Cardiovascular:      Rate and Rhythm: Normal rate and regular rhythm.      Heart sounds: No murmur heard.  Pulmonary:      Effort: Pulmonary effort is normal.      Breath sounds: Normal breath sounds. No wheezing.   Musculoskeletal:         General: Normal range of motion.   Skin:     General: Skin is warm and dry.      Findings: No rash.   Neurological:      Mental Status: She is alert and oriented to person, place, and time.            No visits with results within 1 Day(s) from this visit.   Latest known visit with results is:   Results Encounter on 08/21/2022   Component Date Value Ref Range Status   • Hemoglobin A1C 09/12/2022 6.4 (A) 4.8 - 5.6 % Final    Comment:          Prediabetes: 5.7 - 6.4           Diabetes: >6.4           Glycemic control for adults with diabetes: <7.0     • Glucose 09/12/2022 120 (A) 65 - 99 mg/dL Final   • BUN 09/12/2022 14  8 - 27 mg/dL Final   • Creatinine 09/12/2022 0.86  0.57 - 1.00 mg/dL Final   • EGFR Result 09/12/2022 75  >59 mL/min/1.73 Final   • BUN/Creatinine Ratio 09/12/2022 16  12 - 28 Final   • Sodium 09/12/2022 144  134 - 144 mmol/L Final   • Potassium 09/12/2022 4.4  3.5 - 5.2 mmol/L Final   • Chloride 09/12/2022 106  96 - 106 mmol/L Final   • Total CO2 09/12/2022 25  20 - 29 mmol/L Final   • Calcium 09/12/2022 9.5  8.7 - 10.3 mg/dL Final   • Total Protein 09/12/2022 7.2  6.0 - 8.5 g/dL Final   • Albumin 09/12/2022 4.5  3.8 - 4.8 g/dL Final   • Globulin 09/12/2022 2.7  1.5 - 4.5 g/dL Final   • A/G Ratio 09/12/2022 1.7  1.2 - 2.2 Final   • Total Bilirubin 09/12/2022 0.6  0.0 - 1.2 mg/dL Final   • Alkaline Phosphatase 09/12/2022 97  44 - 121 IU/L Final   • AST (SGOT) 09/12/2022 12  0 - 40 IU/L Final   • ALT (SGPT) 09/12/2022 12  0 - 32 IU/L Final       Lab Results   Component Value Date    BUN 14 09/12/2022    CREATININE 0.86 09/12/2022     09/12/2022    K 4.4 09/12/2022     09/12/2022    CALCIUM 9.5 09/12/2022    ALBUMIN 4.5 09/12/2022    BILITOT  0.6 09/12/2022    ALKPHOS 97 09/12/2022    AST 12 09/12/2022    ALT 12 09/12/2022        Lab Results   Component Value Date    HGBA1C 6.4 (H) 09/12/2022    HGBA1C 6.5 (H) 05/20/2022    HGBA1C 6.3 (H) 02/21/2022    HGBA1C 6.4 (H) 09/08/2021                Assessment and Plan    Diagnoses and all orders for this visit:    1. Type 2 diabetes mellitus without complication, without long-term current use of insulin ([facility]) (Primary)  -     Dulaglutide (Trulicity) 1.5 MG/0.5ML solution pen-injector; Inject 1.5 mg under the skin into the appropriate area as directed 1 (One) Time Per Week.  Dispense: 2 mL; Refill: 5    2. Cough  -     albuterol sulfate  (90 Base) MCG/ACT inhaler; Inhale 2 puffs Every 6 (Six) Hours As Needed for Wheezing.  Dispense: 18 g; Refill: 3    3. Seasonal allergic rhinitis due to pollen  -     mometasone (NASONEX) 50 MCG/ACT nasal spray; 2 sprays into the nostril(s) as directed by provider Daily.  Dispense: 3 each; Refill: 3    4. Sinus congestion    5. Allergic rhinitis due to pollen, unspecified seasonality  -     Ambulatory Referral to Allergy    6. Nasal septal deviation    7. Class 2 severe obesity due to excess calories with serious comorbidity and body mass index (BMI) of 35.0 to 35.9 in adult ([facility])  Assessment & Plan:  Patient's (Body mass index is 35.34 kg/m².) indicates that they are obese (BMI >30) with health conditions that include hypertension, diabetes mellitus, dyslipidemias and GERD . Weight is unchanged. BMI is is above average; BMI management plan is completed. We discussed portion control and increasing exercise.     Orders:  -     Dulaglutide (Trulicity) 1.5 MG/0.5ML solution pen-injector; Inject 1.5 mg under the skin into the appropriate area as directed 1 (One) Time Per Week.  Dispense: 2 mL; Refill: 5      Diabetes good control on Trulicity patient denies any side effects with the medication.  Discussed increasing to help facilitate weight reduction.  Patient would like to  try the higher dose.    Allergic rhinitis, advised to restart Nasonex and use antihistamine daily.  Discussed Astelin nasal spray for drippy runny nose, patient declined.  She is interested in possibly pursuing allergy shots.  Referral to allergist.    Medications Discontinued During This Encounter   Medication Reason   • Lyrica 100 MG capsule *Therapy completed   • Dulaglutide (Trulicity) 0.75 MG/0.5ML solution pen-injector Dose adjustment   • albuterol sulfate  (90 Base) MCG/ACT inhaler Reorder   • mometasone (NASONEX) 50 MCG/ACT nasal spray Reorder         Follow Up     Return in about 3 months (around 12/14/2022) for diabetes, with Labs.    Patient was given instructions and counseling regarding her condition or for health maintenance advice. Please see specific information pulled into the AVS if appropriate.

## 2022-09-14 NOTE — ASSESSMENT & PLAN NOTE
Patient's (Body mass index is 35.34 kg/m².) indicates that they are obese (BMI >30) with health conditions that include hypertension, diabetes mellitus, dyslipidemias and GERD . Weight is unchanged. BMI is is above average; BMI management plan is completed. We discussed portion control and increasing exercise.

## 2022-09-15 PROBLEM — R09.81 SINUS CONGESTION: Status: ACTIVE | Noted: 2022-09-15

## 2022-09-15 PROBLEM — J34.2 NASAL SEPTAL DEVIATION: Status: ACTIVE | Noted: 2022-09-15

## 2022-12-14 DIAGNOSIS — E11.9 TYPE 2 DIABETES MELLITUS WITHOUT COMPLICATION, WITHOUT LONG-TERM CURRENT USE OF INSULIN: Primary | ICD-10-CM

## 2022-12-14 DIAGNOSIS — I10 PRIMARY HYPERTENSION: ICD-10-CM

## 2022-12-15 LAB
ALBUMIN SERPL-MCNC: 4.4 G/DL (ref 3.8–4.8)
ALBUMIN/GLOB SERPL: 1.9 {RATIO} (ref 1.2–2.2)
ALP SERPL-CCNC: 90 IU/L (ref 44–121)
ALT SERPL-CCNC: 12 IU/L (ref 0–32)
AST SERPL-CCNC: 13 IU/L (ref 0–40)
BILIRUB SERPL-MCNC: 0.9 MG/DL (ref 0–1.2)
BUN SERPL-MCNC: 16 MG/DL (ref 8–27)
BUN/CREAT SERPL: 19 (ref 12–28)
CALCIUM SERPL-MCNC: 9.1 MG/DL (ref 8.7–10.3)
CHLORIDE SERPL-SCNC: 104 MMOL/L (ref 96–106)
CO2 SERPL-SCNC: 25 MMOL/L (ref 20–29)
CREAT SERPL-MCNC: 0.84 MG/DL (ref 0.57–1)
EGFRCR SERPLBLD CKD-EPI 2021: 77 ML/MIN/1.73
GLOBULIN SER CALC-MCNC: 2.3 G/DL (ref 1.5–4.5)
GLUCOSE SERPL-MCNC: 92 MG/DL (ref 70–99)
HBA1C MFR BLD: 6.5 % (ref 4.8–5.6)
POTASSIUM SERPL-SCNC: 4.3 MMOL/L (ref 3.5–5.2)
PROT SERPL-MCNC: 6.7 G/DL (ref 6–8.5)
SODIUM SERPL-SCNC: 142 MMOL/L (ref 134–144)

## 2022-12-30 DIAGNOSIS — I10 ESSENTIAL HYPERTENSION: ICD-10-CM

## 2022-12-30 DIAGNOSIS — Z79.4 TYPE 2 DIABETES MELLITUS WITHOUT COMPLICATION, WITH LONG-TERM CURRENT USE OF INSULIN: ICD-10-CM

## 2022-12-30 DIAGNOSIS — E11.9 TYPE 2 DIABETES MELLITUS WITHOUT COMPLICATION, WITH LONG-TERM CURRENT USE OF INSULIN: ICD-10-CM

## 2023-01-01 RX ORDER — BLOOD SUGAR DIAGNOSTIC
STRIP MISCELLANEOUS
Qty: 100 EACH | Refills: 3 | Status: SHIPPED | OUTPATIENT
Start: 2023-01-01

## 2023-01-01 RX ORDER — BENAZEPRIL HYDROCHLORIDE 10 MG/1
TABLET ORAL
Qty: 90 TABLET | Refills: 3 | Status: SHIPPED | OUTPATIENT
Start: 2023-01-01

## 2023-02-23 DIAGNOSIS — E66.01 CLASS 2 SEVERE OBESITY DUE TO EXCESS CALORIES WITH SERIOUS COMORBIDITY AND BODY MASS INDEX (BMI) OF 35.0 TO 35.9 IN ADULT: ICD-10-CM

## 2023-02-23 DIAGNOSIS — E11.9 TYPE 2 DIABETES MELLITUS WITHOUT COMPLICATION, WITHOUT LONG-TERM CURRENT USE OF INSULIN: ICD-10-CM

## 2023-02-24 RX ORDER — DULAGLUTIDE 1.5 MG/.5ML
INJECTION, SOLUTION SUBCUTANEOUS
Qty: 4 ML | Refills: 0 | OUTPATIENT
Start: 2023-02-24

## 2023-02-24 NOTE — TELEPHONE ENCOUNTER
Attempted to reach patient. No answer. Patient has not been seen since September of last year and patient has an appointment to establish care with a new primary care provider.

## 2023-02-25 DIAGNOSIS — E66.01 CLASS 2 SEVERE OBESITY DUE TO EXCESS CALORIES WITH SERIOUS COMORBIDITY AND BODY MASS INDEX (BMI) OF 35.0 TO 35.9 IN ADULT: ICD-10-CM

## 2023-02-25 DIAGNOSIS — E11.9 TYPE 2 DIABETES MELLITUS WITHOUT COMPLICATION, WITHOUT LONG-TERM CURRENT USE OF INSULIN: ICD-10-CM

## 2023-02-27 RX ORDER — DULAGLUTIDE 1.5 MG/.5ML
1.5 INJECTION, SOLUTION SUBCUTANEOUS WEEKLY
Qty: 2 ML | Refills: 0 | Status: SHIPPED | OUTPATIENT
Start: 2023-02-27

## 2023-03-13 ENCOUNTER — TELEPHONE (OUTPATIENT)
Dept: FAMILY MEDICINE CLINIC | Facility: CLINIC | Age: 68
End: 2023-03-13

## 2023-03-13 NOTE — TELEPHONE ENCOUNTER
"  Caller: Johanna Swan \"Dennis\"    Relationship to patient: Self    Best call back number: [phone]   Patient is needing:    ALBERTA WOULD LIKE TO KNOW IF SHE CAN BRING HER 2 MONTH OLD GRANDCHILD TO HER APPOINTMENT 3/17/2023?  "

## 2023-03-13 NOTE — TELEPHONE ENCOUNTER
"  Caller: Johanna Swan \"Dennis\"    Relationship to patient: Self    Best call back number: [phone]     Patient is needing:       ALBERTA WOULD LIKE TO KNOW IF SHE CAN BRING HER 2 MONTH OLD GRANDCHILD TO HER APPOINTMENT 3/17/2023?    "

## 2023-03-30 ENCOUNTER — TELEPHONE (OUTPATIENT)
Dept: FAMILY MEDICINE CLINIC | Facility: CLINIC | Age: 68
End: 2023-03-30
Payer: MEDICARE

## 2023-03-30 DIAGNOSIS — E78.2 MIXED HYPERLIPIDEMIA: ICD-10-CM

## 2023-03-30 DIAGNOSIS — E11.9 TYPE 2 DIABETES MELLITUS WITHOUT COMPLICATION, WITHOUT LONG-TERM CURRENT USE OF INSULIN: Primary | ICD-10-CM

## 2023-03-30 DIAGNOSIS — I10 PRIMARY HYPERTENSION: ICD-10-CM

## 2023-03-30 DIAGNOSIS — E03.9 ACQUIRED HYPOTHYROIDISM: ICD-10-CM

## 2023-03-30 NOTE — TELEPHONE ENCOUNTER
----- Message from Emma Moeller MA sent at 3/29/2023  3:09 PM EDT -----  Please place lab order for appt on 04/24/2023 for DM, has upcoming medicare appts also.

## 2023-04-18 NOTE — PROGRESS NOTES
Chief Complaint  Diabetes    History of Present Illness  Johanna Swan presents today for diabetes follow up.   Patient has not been in office since September due to issue with insurance assigning her to a different provider.  She did have labs in December.  Diabetes  Patient does check blood sugar at home. She reports she checks them about once a week.   Per patient fasting blood sugars range between 110-120.   Associated symptoms include none.  Per patient current diet is unhealthy.   Patient does not avoid concentrated sweets.  Patient does see podiatry.  Patient see's [facility]- Dr. Sara Ledezma for diabetic eye exam and last eye exam was August 2022.  She will schedule this summer.      Current Outpatient Medications on File Prior to Visit   Medication Sig   • albuterol sulfate  (90 Base) MCG/ACT inhaler Inhale 2 puffs Every 6 (Six) Hours As Needed for Wheezing.   • Amitiza 24 MCG capsule Take 1 capsule by mouth 2 (Two) Times a Day.   • benazepril (LOTENSIN) 10 MG tablet TAKE 1 TABLET DAILY        (INCREASED)   • dicyclomine (BENTYL) 10 MG capsule Take 1 capsule by mouth 4 (Four) Times a Day Before Meals & at Bedtime.   • DULoxetine (CYMBALTA) 60 MG capsule Take 1 capsule by mouth Daily.   • Erenumab-aooe (AIMOVIG) 140 MG/ML prefilled syringe Inject 0.0071 mL under the skin into the appropriate area as directed Every 30 (Thirty) Days.   • esomeprazole (nexIUM) 40 MG capsule Take 1 capsule by mouth Daily.   • glucose blood (OneTouch Verio) test strip Use as instructed to check glucose once daily   • glucose blood (OneTouch Verio) test strip Use as instructed   • guaifenesin-dextromethorphan (MUCINEX DM)  MG tablet sustained-release 12 hour tablet Take 1 tablet by mouth 2 (Two) Times a Day As Needed (cough and congestion).   • HYDROcodone-acetaminophen (NORCO) 5-325 MG per tablet Take 1 tablet by mouth 2 (Two) Times a Day As Needed.   • hydrOXYzine (ATARAX) 25 MG tablet Take 1 tablet by mouth 3  (Three) Times a Day As Needed for Itching.   • levothyroxine (Synthroid) 88 MCG tablet Take 1 tablet by mouth Daily.   • lidocaine (XYLOCAINE) 5 % ointment APPLY .5 G OF CREAM TO AFFECTED AREA NOT EXCEEDING MORE THEN 13 TUBE PER DAY.   • Lidocaine-Tetracaine 7-7 % cream APPLY TO AFFECTED AREA TWICE DAILY AS NEEDED   • Lyrica 150 MG capsule Take 1 capsule by mouth 2 (Two) Times a Day.   • mometasone (Elocon) 0.1 % cream Apply to rash twice daily as needed   • mometasone (NASONEX) 50 MCG/ACT nasal spray 2 sprays into the nostril(s) as directed by provider Daily.   • OneTouch Verio test strip USE AND DISCARD 1 TEST     STRIP DAILY AS DIRECTED   • rizatriptan (MAXALT) 10 MG tablet Take 1 tablet by mouth 1 (One) Time As Needed for Migraine (May repeat in 2 hours if needed for persistent headache).   • rosuvastatin (CRESTOR) 5 MG tablet TAKE 1 TABLET DAILY   • tamsulosin (FLOMAX) 0.4 MG capsule 24 hr capsule Take 1 capsule by mouth Daily.   • tiZANidine (ZANAFLEX) 4 MG tablet Take 1 tablet by mouth Every Night.   • [DISCONTINUED] Trulicity 1.5 MG/0.5ML solution pen-injector INJECT 1.5 MG SUBCUTANEOUSLY INTO THE APPROPRIATE AREA AS DIRECTED ONCE A WEEK   • Azelastine HCl 137 MCG/SPRAY solution USE 1 TO 2 SPRAY(S) IN EACH NOSTRIL TWICE DAILY AS NEEDED FOR DRAINAGE, SNEEZING OR COUGH   • metoprolol succinate XL (TOPROL-XL) 25 MG 24 hr tablet Take 1 tablet by mouth Daily.   • [DISCONTINUED] Glucosamine-Chondroitin-MSM ([facility] Glucosamine-Chondroit-MSM) 500-400-167 MG tablet Take 1 tablet by mouth 3 (Three) Times a Day.   • [DISCONTINUED] ondansetron (Zofran) 8 MG tablet Take 1 tablet by mouth Every 8 (Eight) Hours As Needed for Nausea or Vomiting. (Patient not taking: Reported on 4/24/2023)     No current facility-administered medications on file prior to visit.       Objective   Vital Signs:   /90 (BP Location: Right arm, Patient Position: Sitting, Cuff Size: Adult)   Pulse (!) 125   Temp 98.6 °F (37 °C) (Temporal)    "Resp 18   Ht 160 cm (62.99\")   Wt 89.8 kg (198 lb)   SpO2 95%   BMI 35.08 kg/m²       Physical Exam  Vitals and nursing note reviewed.   Constitutional:       General: She is not in acute distress.     Appearance: She is well-developed.   HENT:      Head: Normocephalic and atraumatic.   Cardiovascular:      Rate and Rhythm: Normal rate and regular rhythm.      Heart sounds: No murmur heard.  Pulmonary:      Effort: Pulmonary effort is normal.      Breath sounds: Normal breath sounds. No wheezing.   Musculoskeletal:         General: Normal range of motion.   Skin:     General: Skin is warm and dry.      Findings: No rash.   Neurological:      Mental Status: She is alert and oriented to person, place, and time.            No visits with results within 1 Day(s) from this visit.   Latest known visit with results is:   Results Encounter on 03/30/2023   Component Date Value Ref Range Status   • Hemoglobin A1C 04/21/2023 6.7 (H)  4.8 - 5.6 % Final    Comment:          Prediabetes: 5.7 - 6.4           Diabetes: >6.4           Glycemic control for adults with diabetes: <7.0     • Total Cholesterol 04/21/2023 163  100 - 199 mg/dL Final   • Triglycerides 04/21/2023 118  0 - 149 mg/dL Final   • HDL Cholesterol 04/21/2023 54  >39 mg/dL Final   • VLDL Cholesterol Silverio 04/21/2023 21  5 - 40 mg/dL Final   • LDL Chol Calc ([facility]) 04/21/2023 88  0 - 99 mg/dL Final   • Glucose 04/21/2023 109 (H)  70 - 99 mg/dL Final   • BUN 04/21/2023 14  8 - 27 mg/dL Final   • Creatinine 04/21/2023 0.73  0.57 - 1.00 mg/dL Final   • EGFR Result 04/21/2023 91  >59 mL/min/1.73 Final   • BUN/Creatinine Ratio 04/21/2023 19  12 - 28 Final   • Sodium 04/21/2023 141  134 - 144 mmol/L Final   • Potassium 04/21/2023 4.6  3.5 - 5.2 mmol/L Final   • Chloride 04/21/2023 104  96 - 106 mmol/L Final   • Total CO2 04/21/2023 25  20 - 29 mmol/L Final   • Calcium 04/21/2023 9.3  8.7 - 10.3 mg/dL Final   • Total Protein 04/21/2023 6.5  6.0 - 8.5 g/dL Final   • Albumin " 04/21/2023 4.2  3.8 - 4.8 g/dL Final   • Globulin 04/21/2023 2.3  1.5 - 4.5 g/dL Final   • A/G Ratio 04/21/2023 1.8  1.2 - 2.2 Final   • Total Bilirubin 04/21/2023 0.7  0.0 - 1.2 mg/dL Final   • Alkaline Phosphatase 04/21/2023 82  44 - 121 IU/L Final   • AST (SGOT) 04/21/2023 13  0 - 40 IU/L Final   • ALT (SGPT) 04/21/2023 12  0 - 32 IU/L Final   • WBC 04/21/2023 5.0  3.4 - 10.8 x10E3/uL Final   • RBC 04/21/2023 4.37  3.77 - 5.28 x10E6/uL Final   • Hemoglobin 04/21/2023 12.3  11.1 - 15.9 g/dL Final   • Hematocrit 04/21/2023 37.8  34.0 - 46.6 % Final   • MCV 04/21/2023 87  79 - 97 fL Final   • MCH 04/21/2023 28.1  26.6 - 33.0 pg Final   • MCHC 04/21/2023 32.5  31.5 - 35.7 g/dL Final   • RDW 04/21/2023 14.6  11.7 - 15.4 % Final   • Platelets 04/21/2023 314  150 - 450 x10E3/uL Final   • Neutrophil Rel % 04/21/2023 50  Not Estab. % Final   • Lymphocyte Rel % 04/21/2023 30  Not Estab. % Final   • Monocyte Rel % 04/21/2023 11  Not Estab. % Final   • Eosinophil Rel % 04/21/2023 7  Not Estab. % Final   • Basophil Rel % 04/21/2023 2  Not Estab. % Final   • Neutrophils Absolute 04/21/2023 2.5  1.4 - 7.0 x10E3/uL Final   • Lymphocytes Absolute 04/21/2023 1.5  0.7 - 3.1 x10E3/uL Final   • Monocytes Absolute 04/21/2023 0.5  0.1 - 0.9 x10E3/uL Final   • Eosinophils Absolute 04/21/2023 0.3  0.0 - 0.4 x10E3/uL Final   • Basophils Absolute 04/21/2023 0.1  0.0 - 0.2 x10E3/uL Final   • Immature Granulocyte Rel % 04/21/2023 0  Not Estab. % Final   • Immature Grans Absolute 04/21/2023 0.0  0.0 - 0.1 x10E3/uL Final   • TSH 04/21/2023 1.630  0.450 - 4.500 uIU/mL Final   • Free T4 04/21/2023 1.20  0.82 - 1.77 ng/dL Final     A1C Last 3 Results        9/12/2022    10:10 12/14/2022    11:30 4/21/2023    11:12   HGBA1C Last 3 Results   Hemoglobin A1C 6.4   6.5   6.7       Lab Results   Component Value Date    CHLPL 163 04/21/2023    TRIG 118 04/21/2023    HDL 54 04/21/2023    LDL 88 04/21/2023     Lab Results   Component Value Date    TSH  1.630 04/21/2023     Lab Results   Component Value Date    GLUCOSE 109 (H) 04/21/2023    BUN 14 04/21/2023    CREATININE 0.73 04/21/2023    EGFRIFNONA 70 02/21/2022    EGFRIFAFRI 81 02/21/2022    BCR 19 04/21/2023    K 4.6 04/21/2023    CO2 25 04/21/2023    CALCIUM 9.3 04/21/2023    PROTENTOTREF 6.5 04/21/2023    ALBUMIN 4.2 04/21/2023    LABIL2 1.8 04/21/2023    AST 13 04/21/2023    ALT 12 04/21/2023     Lab Results   Component Value Date    WBC 5.0 04/21/2023    HGB 12.3 04/21/2023    HCT 37.8 04/21/2023    MCV 87 04/21/2023     04/21/2023                      Assessment and Plan    Diagnoses and all orders for this visit:    1. Type 2 diabetes mellitus without complication, without long-term current use of insulin (Primary)  -     Dulaglutide (Trulicity) 3 MG/0.5ML solution pen-injector; Inject 0.5 mL under the skin into the appropriate area as directed 1 (One) Time Per Week.  Dispense: 6 mL; Refill: 3  -     Hemoglobin A1c; Future  -     Comprehensive Metabolic Panel; Future    2. Class 2 severe obesity due to excess calories with serious comorbidity and body mass index (BMI) of 35.0 to 35.9 in adult ([facility])    3. Acquired hypothyroidism  -     TSH; Future  -     T4, Free; Future    4. Primary hypertension  -     Comprehensive Metabolic Panel; Future    5. Mixed hyperlipidemia  -     Lipid Panel; Future  -     Comprehensive Metabolic Panel; Future    6. Nausea  -     ondansetron (Zofran) 4 MG tablet; 1-2 tablets every 6-8 hours if needed.  Dispense: 30 tablet; Refill: 1    7. Class 2 severe obesity due to excess calories with serious comorbidity and body mass index (BMI) of 35.0 to 35.9 in adult      Discussed results with patient at time of appointment.  Please see office visit note dated 4/24/2023.  A1c slightly increased, currently 6.7, trending upward over the past 2 years.  We are going to try higher dose of Trulicity at 3 mg weekly.  Would also like her to check sugars occasionally to make sure they  are staying within goal range.  She is currently only checking if she feels like they are low or high and she is getting numbers of 90 or 225.  We did discuss continuous glucose monitor for better accountability, this could be helpful if it is covered by her insurance.  Cholesterol and thyroid testing at goal.  CBC within normal limits.    Hypertension, above goal today.  Patient has discontinued metoprolol but is willing to restart.    Occasional nausea.  She believes this may be due to generic pregabalin and is going to talk with pain management about getting back on brand-name.  We will renew Zofran for as needed use.    Patient did have evaluation with ENT yet.  MRI did show a cyst in the nasal passages and inflammation.  She has had cluster shots for allergies and is starting on maintenance allergy shots.    Recommend Prevnar 20 and COVID-19 bivalent booster.        Medications Discontinued During This Encounter   Medication Reason   • Glucosamine-Chondroitin-MSM ([employer] Glucosamine-Chondroit-MSM) 500-400-167 MG tablet Cost of medication   • Trulicity 1.5 MG/0.5ML solution pen-injector Dose adjustment   • ondansetron (Zofran) 8 MG tablet Dose adjustment         Follow Up     Return in about 1 month (around 5/24/2023) for Medicare Wellness.    Patient was given instructions and counseling regarding her condition or for health maintenance advice. Please see specific information pulled into the AVS if appropriate.

## 2023-04-20 DIAGNOSIS — E11.9 TYPE 2 DIABETES MELLITUS WITHOUT COMPLICATION, WITHOUT LONG-TERM CURRENT USE OF INSULIN: ICD-10-CM

## 2023-04-20 DIAGNOSIS — E66.01 CLASS 2 SEVERE OBESITY DUE TO EXCESS CALORIES WITH SERIOUS COMORBIDITY AND BODY MASS INDEX (BMI) OF 35.0 TO 35.9 IN ADULT: ICD-10-CM

## 2023-04-22 RX ORDER — DULAGLUTIDE 1.5 MG/.5ML
INJECTION, SOLUTION SUBCUTANEOUS
Qty: 4 ML | Refills: 0 | Status: SHIPPED | OUTPATIENT
Start: 2023-04-22 | End: 2023-04-24 | Stop reason: DRUGHIGH

## 2023-04-24 ENCOUNTER — OFFICE VISIT (OUTPATIENT)
Dept: FAMILY MEDICINE CLINIC | Facility: CLINIC | Age: 68
End: 2023-04-24
Payer: MEDICARE

## 2023-04-24 VITALS
SYSTOLIC BLOOD PRESSURE: 140 MMHG | RESPIRATION RATE: 18 BRPM | OXYGEN SATURATION: 95 % | HEART RATE: 125 BPM | DIASTOLIC BLOOD PRESSURE: 90 MMHG | BODY MASS INDEX: 35.08 KG/M2 | WEIGHT: 198 LBS | TEMPERATURE: 98.6 F | HEIGHT: 63 IN

## 2023-04-24 DIAGNOSIS — E78.2 MIXED HYPERLIPIDEMIA: ICD-10-CM

## 2023-04-24 DIAGNOSIS — E66.01 CLASS 2 SEVERE OBESITY DUE TO EXCESS CALORIES WITH SERIOUS COMORBIDITY AND BODY MASS INDEX (BMI) OF 35.0 TO 35.9 IN ADULT: ICD-10-CM

## 2023-04-24 DIAGNOSIS — R11.0 NAUSEA: ICD-10-CM

## 2023-04-24 DIAGNOSIS — I10 PRIMARY HYPERTENSION: ICD-10-CM

## 2023-04-24 DIAGNOSIS — E11.9 TYPE 2 DIABETES MELLITUS WITHOUT COMPLICATION, WITHOUT LONG-TERM CURRENT USE OF INSULIN: Primary | ICD-10-CM

## 2023-04-24 DIAGNOSIS — E03.9 ACQUIRED HYPOTHYROIDISM: ICD-10-CM

## 2023-04-24 RX ORDER — DULAGLUTIDE 3 MG/.5ML
3 INJECTION, SOLUTION SUBCUTANEOUS WEEKLY
Qty: 6 ML | Refills: 3 | Status: SHIPPED | OUTPATIENT
Start: 2023-04-24

## 2023-04-24 RX ORDER — AZELASTINE HYDROCHLORIDE 137 UG/1
SPRAY, METERED NASAL
COMMUNITY
Start: 2023-03-11

## 2023-04-24 RX ORDER — ONDANSETRON 4 MG/1
TABLET, FILM COATED ORAL
Qty: 30 TABLET | Refills: 1 | Status: SHIPPED | OUTPATIENT
Start: 2023-04-24

## 2023-04-24 RX ORDER — HYDROXYZINE HYDROCHLORIDE 25 MG/1
25 TABLET, FILM COATED ORAL 3 TIMES DAILY PRN
COMMUNITY

## 2023-04-25 ENCOUNTER — CLINICAL SUPPORT (OUTPATIENT)
Dept: FAMILY MEDICINE CLINIC | Facility: CLINIC | Age: 68
End: 2023-04-25
Payer: MEDICARE

## 2023-04-25 DIAGNOSIS — R00.2 PALPITATIONS: ICD-10-CM

## 2023-04-25 DIAGNOSIS — Z23 NEED FOR PNEUMOCOCCAL 20-VALENT CONJUGATE VACCINATION: Primary | ICD-10-CM

## 2023-04-25 DIAGNOSIS — I10 PRIMARY HYPERTENSION: ICD-10-CM

## 2023-04-25 RX ORDER — METOPROLOL SUCCINATE 25 MG/1
25 TABLET, EXTENDED RELEASE ORAL DAILY
Qty: 90 TABLET | Refills: 3 | Status: SHIPPED | OUTPATIENT
Start: 2023-04-25

## 2023-05-14 DIAGNOSIS — E03.9 ACQUIRED HYPOTHYROIDISM: ICD-10-CM

## 2023-05-15 RX ORDER — LEVOTHYROXINE SODIUM 88 MCG
TABLET ORAL
Qty: 90 TABLET | Refills: 3 | OUTPATIENT
Start: 2023-05-15

## 2023-05-23 NOTE — PROGRESS NOTES
The ABCs of the Annual Wellness Visit  Subsequent Medicare Wellness Visit    Subjective    Johanna Swan is a 67 y.o. female who presents for a Subsequent Medicare Wellness Visit.    The following portions of the patient's history were reviewed and   updated as appropriate: allergies, current medications, past family history, past medical history, past social history, past surgical history and problem list.    Compared to one year ago, the patient feels her physical   health is better.    Compared to one year ago, the patient feels her mental   health is the same.    Recent Hospitalizations:  She was not admitted to the hospital during the last year.       Current Medical Providers:  Patient Care Team:  Spring Silverio MD as PCP - General (Family Medicine)  Spring Silverio MD as PCP - Family Medicine    Outpatient Medications Prior to Visit   Medication Sig Dispense Refill   • albuterol sulfate  (90 Base) MCG/ACT inhaler Inhale 2 puffs Every 6 (Six) Hours As Needed for Wheezing. 18 g 3   • Amitiza 24 MCG capsule Take 1 capsule by mouth 2 (Two) Times a Day.  3   • Azelastine HCl 137 MCG/SPRAY solution USE 1 TO 2 SPRAY(S) IN EACH NOSTRIL TWICE DAILY AS NEEDED FOR DRAINAGE, SNEEZING OR COUGH     • benazepril (LOTENSIN) 10 MG tablet TAKE 1 TABLET DAILY        (INCREASED) 90 tablet 3   • Dulaglutide (Trulicity) 3 MG/0.5ML solution pen-injector Inject 0.5 mL under the skin into the appropriate area as directed 1 (One) Time Per Week. 6 mL 3   • DULoxetine (CYMBALTA) 60 MG capsule Take 1 capsule by mouth Daily.     • Erenumab-aooe (AIMOVIG) 140 MG/ML prefilled syringe Inject 0.0071 mL under the skin into the appropriate area as directed Every 30 (Thirty) Days.     • esomeprazole (nexIUM) 40 MG capsule Take 1 capsule by mouth Daily.     • glucose blood (OneTouch Verio) test strip Use as instructed to check glucose once daily 50 each 12   • glucose blood (OneTouch Verio) test strip Use as  instructed 100 each 3   • guaifenesin-dextromethorphan (MUCINEX DM)  MG tablet sustained-release 12 hour tablet Take 1 tablet by mouth 2 (Two) Times a Day As Needed (cough and congestion). 20 tablet 1   • HYDROcodone-acetaminophen (NORCO) 5-325 MG per tablet Take 1 tablet by mouth 2 (Two) Times a Day As Needed.     • hydrOXYzine (ATARAX) 25 MG tablet Take 1 tablet by mouth 3 (Three) Times a Day As Needed for Itching.     • levothyroxine (Synthroid) 88 MCG tablet Take 1 tablet by mouth Daily. 90 tablet 3   • lidocaine (XYLOCAINE) 5 % ointment APPLY .5 G OF CREAM TO AFFECTED AREA NOT EXCEEDING MORE THEN 13 TUBE PER DAY.     • Lidocaine-Tetracaine 7-7 % cream APPLY TO AFFECTED AREA TWICE DAILY AS NEEDED     • Lyrica 150 MG capsule Take 1 capsule by mouth 2 (Two) Times a Day.     • metoprolol succinate XL (TOPROL-XL) 25 MG 24 hr tablet Take 1 tablet by mouth Daily. 90 tablet 3   • mometasone (Elocon) 0.1 % cream Apply to rash twice daily as needed 50 g 1   • mometasone (NASONEX) 50 MCG/ACT nasal spray 2 sprays into the nostril(s) as directed by provider Daily. 3 each 3   • ondansetron (Zofran) 4 MG tablet 1-2 tablets every 6-8 hours if needed. 30 tablet 1   • OneTouch Verio test strip USE AND DISCARD 1 TEST     STRIP DAILY AS DIRECTED 100 each 3   • rizatriptan (MAXALT) 10 MG tablet Take 1 tablet by mouth 1 (One) Time As Needed for Migraine (May repeat in 2 hours if needed for persistent headache). 9 tablet 5   • rosuvastatin (CRESTOR) 5 MG tablet TAKE 1 TABLET DAILY 90 tablet 3   • tamsulosin (FLOMAX) 0.4 MG capsule 24 hr capsule Take 1 capsule by mouth Daily.  11   • tiZANidine (ZANAFLEX) 4 MG tablet Take 1 tablet by mouth Every Night.     • dicyclomine (BENTYL) 10 MG capsule Take 1 capsule by mouth 4 (Four) Times a Day Before Meals & at Bedtime. 60 capsule 0     No facility-administered medications prior to visit.       Opioid medication/s are on active medication list.  and I have evaluated her active  treatment plan and pain score trends (see table).  Vitals:    05/24/23 1329   PainSc:   3   PainLoc: Back     I have reviewed the chart for potential of high risk medication and harmful drug interactions in the elderly.            Aspirin is not on active medication list.  Aspirin use is not indicated based on review of current medical condition/s. Risk of harm outweighs potential benefits.  .    Patient Active Problem List   Diagnosis   • Acute stress reaction   • Arthritis   • Spinal stenosis of cervical region   • Cyst, eyelid   • Degeneration of lumbar or lumbosacral intervertebral disc   • Degenerative disc disease, cervical   • Herniated cervical disc   • Esophageal stricture   • Family history of diabetes mellitus   • Hair loss   • Hematuria   • Hyperlipidemia   • Hypertension   • Hypothyroidism   • Knee pain, right   • Class 1 obesity due to excess calories with serious comorbidity and body mass index (BMI) of 34.0 to 34.9 in adult   • Osteoarthritis of knee   • Restless leg syndrome   • Cervicalgia   • Thoracic back pain   • Acid reflux disease   • Allergic rhinitis   • Chronic joint pain   • Hernia, diaphragmatic   • History of ulcer disease   • Migraine   • Nerve root disorder   • Syringomyelia, acquired   • Spinal stenosis of thoracic region   • Cervical spondylosis with myelopathy   • Osteoarthritis of carpometacarpal joint of thumb   • Lumbar spondylosis with myelopathy   • Colon polyps   • GERD (gastroesophageal reflux disease)   • IBS (irritable bowel syndrome)   • Spinal stenosis of lumbar region with neurogenic claudication   • Type 2 diabetes mellitus without complication, without long-term current use of insulin ([facility])   • History of colon polyps   • Chronic bilateral low back pain with bilateral sciatica   • Pain in both feet   • Bunion of great toe of left foot   • Plantar fasciitis   • Nasal septal deviation   • Sinus congestion     Advance Care Planning   Advance Care Planning     Advance  "Directive is on file.  ACP discussion was held with the patient during this visit. Patient has an advance directive in EMR which is still valid.      Objective    Vitals:    23 1329   BP: 128/78   BP Location: Left arm   Patient Position: Sitting   Cuff Size: Adult   Pulse: (!) 124   Resp: 16   Temp: 98 °F (36.7 °C)   TempSrc: Temporal   SpO2: 95%   Weight: 88.5 kg (195 lb)   Height: 160 cm (62.99\")   PainSc:   3   PainLoc: Back     Estimated body mass index is 34.55 kg/m² as calculated from the following:    Height as of this encounter: 160 cm (62.99\").    Weight as of this encounter: 88.5 kg (195 lb).    Class 2 Severe Obesity (BMI >=35 and <=39.9). Obesity-related health conditions include the following: hypertension, diabetes mellitus and dyslipidemias. Obesity is improving with lifestyle modifications. BMI is is above average; BMI management plan is completed. We discussed portion control and increasing exercise.      Does the patient have evidence of cognitive impairment? No    Lab Results   Component Value Date    CHLPL 163 2023    TRIG 118 2023    HDL 54 2023    LDL 88 2023    VLDL 21 2023    HGBA1C 6.7 (H) 2023        HEALTH RISK ASSESSMENT    Smoking Status:  Social History     Tobacco Use   Smoking Status Former   • Packs/day: 1.50   • Years: 20.00   • Pack years: 30.00   • Types: Cigarettes   • Start date: 1970   • Quit date: 1990   • Years since quittin.4   Smokeless Tobacco Never     Alcohol Consumption:  Social History     Substance and Sexual Activity   Alcohol Use No     Fall Risk Screen:    STEPHANEADI Fall Risk Assessment was completed, and patient is at LOW risk for falls.Assessment completed on:2023    Depression Screenin/24/2023     1:00 PM   PHQ-2/PHQ-9 Depression Screening   Little Interest or Pleasure in Doing Things 0-->not at all   Feeling Down, Depressed or Hopeless 0-->not at all   Trouble Falling or Staying Asleep, or " Sleeping Too Much 0-->not at all   Feeling Tired or Having Little Energy 2-->more than half the days   Poor Appetite or Overeating 0-->not at all   Feeling Bad about Yourself - or that You are a Failure or Have Let Yourself or Your Family Down 0-->not at all   Trouble Concentrating on Things, Such as Reading the Newspaper or Watching Television 2-->more than half the days   Moving or Speaking So Slowly that Other People Could Have Noticed? Or the Opposite - Being So Fidgety 0-->not at all   Thoughts that You Would be Better Off Dead or of Hurting Yourself in Some Way 0-->not at all   PHQ-9: Brief Depression Severity Measure Score 4   If You Checked Off Any Problems, How Difficult Have These Problems Made It For You to Do Your Work, Take Care of Things at Home, or Get Along with Other People? not difficult at all       Health Habits and Functional and Cognitive Screenin/24/2023     1:00 PM   Functional & Cognitive Status   Do you have difficulty preparing food and eating? No   Do you have difficulty bathing yourself, getting dressed or grooming yourself? No   Do you have difficulty using the toilet? No   Do you have difficulty moving around from place to place? No   Do you have trouble with steps or getting out of a bed or a chair? No   Current Diet Well Balanced Diet   Dental Exam Up to date   Eye Exam Up to date   Exercise (times per week) 0 times per week   Current Exercises Include No Regular Exercise   Do you need help using the phone?  No   Are you deaf or do you have serious difficulty hearing?  No   Do you need help with transportation? No   Do you need help shopping? No   Do you need help preparing meals?  No   Do you need help with housework?  No   Do you need help with laundry? No   Do you need help taking your medications? No   Do you need help managing money? No   Do you ever drive or ride in a car without wearing a seat belt? No   Have you felt unusual stress, anger or loneliness in the last  month? No   Who do you live with? Spouse   If you need help, do you have trouble finding someone available to you? No   Do you have difficulty concentrating, remembering or making decisions? No       Age-appropriate Screening Schedule:  Refer to the list below for future screening recommendations based on patient's age, sex and/or medical conditions. Orders for these recommended tests are listed in the plan section. The patient has been provided with a written plan.    Health Maintenance   Topic Date Due   • DIABETIC FOOT EXAM  05/21/2022   • COVID-19 Vaccine (4 - Booster for Moderna series) 07/18/2022   • INFLUENZA VACCINE  08/01/2023   • DIABETIC EYE EXAM  08/01/2023   • HEMOGLOBIN A1C  10/21/2023   • LIPID PANEL  04/21/2024   • ANNUAL WELLNESS VISIT  05/24/2024   • URINE MICROALBUMIN  05/24/2024   • MAMMOGRAM  08/01/2024   • DXA SCAN  08/01/2024   • TDAP/TD VACCINES (2 - Td or Tdap) 07/15/2026   • COLORECTAL CANCER SCREENING  05/31/2028   • HEPATITIS C SCREENING  Completed   • Pneumococcal Vaccine 65+  Completed   • ZOSTER VACCINE  Completed   • PAP SMEAR  Discontinued                  CMS Preventative Services Quick Reference  Risk Factors Identified During Encounter  Immunizations Discussed/Encouraged: COVID19  The above risks/problems have been discussed with the patient.  Pertinent information has been shared with the patient in the After Visit Summary.  An After Visit Summary and PPPS were made available to the patient.    Follow Up:   Next Medicare Wellness visit to be scheduled in 1 year.       Additional E&M Note during same encounter follows:  Patient has multiple medical problems which are significant and separately identifiable that require additional work above and beyond the Medicare Wellness Visit.      Chief Complaint  Medicare Wellness-subsequent and Diabetes    Subjective        HPI  Johanna Swan is also being seen today for Diabetes    Diabetes  Patient does check blood sugar at home. She  "reports she checks them daily, and average .  Per patient current diet is unhealthy.   Patient does not avoid concentrated sweets.  Patient does see podiatry.  Patient see's [facility]- Dr. Sara Ledezma for diabetic eye exam and last eye exam was August 2022.  She will schedule this summer.   Patient states when she was taking 3mg of Trulicity her blood sugar dropped down to 66.Had forgotten to eat breakfast.  She broke out in sweats, nausea, and felt very off. She laid down and her  brought her chips and crackers. Her blood sugar came back up to 114. She replaced the 3mg with the 1.5mg one time. She did take the 3mg today, and was 111. She is feeling okay today after taking the injection. Took 2nd dose yesterday (3 days late)     Objective   Vital Signs:  /78 (BP Location: Left arm, Patient Position: Sitting, Cuff Size: Adult)   Pulse (!) 124   Temp 98 °F (36.7 °C) (Temporal)   Resp 16   Ht 160 cm (62.99\")   Wt 88.5 kg (195 lb)   SpO2 95%   BMI 34.55 kg/m²     Physical Exam  Vitals and nursing note reviewed.   Constitutional:       General: She is not in acute distress.     Appearance: She is well-developed. She is obese. She is not ill-appearing.   HENT:      Head: Normocephalic and atraumatic.   Cardiovascular:      Rate and Rhythm: Normal rate and regular rhythm.      Heart sounds: No murmur heard.  Pulmonary:      Effort: Pulmonary effort is normal.      Breath sounds: Normal breath sounds. No wheezing.   Musculoskeletal:         General: Normal range of motion.      Cervical back: Normal range of motion and neck supple.   Lymphadenopathy:      Cervical: No cervical adenopathy.   Skin:     General: Skin is warm and dry.      Findings: No rash.   Neurological:      Mental Status: She is alert and oriented to person, place, and time.   Psychiatric:         Mood and Affect: Mood normal.         Behavior: Behavior normal.         Thought Content: Thought content normal.          The " following data was reviewed by: Spring Silverio MD on 05/24/2023:  Office Visit on 05/24/2023   Component Date Value Ref Range Status   • Microalbumin, Urine 05/24/2023 20   Final   • Creatinine, Urine 05/24/2023 0   Final   • Lot Number 05/24/2023 [id number]   Final   • Expiration Date 05/24/2023 03/31/2024   Final         Common labs        9/12/2022    10:10 12/14/2022    11:30 4/21/2023    11:12   Common Labs   Glucose 120   92   109     BUN 14   16   14     Creatinine 0.86   0.84   0.73     Sodium 144   142   141     Potassium 4.4   4.3   4.6     Chloride 106   104   104     Calcium 9.5   9.1   9.3     Total Protein 7.2   6.7   6.5     Albumin 4.5   4.4   4.2     Total Bilirubin 0.6   0.9   0.7     Alkaline Phosphatase 97   90   82     AST (SGOT) 12   13   13     ALT (SGPT) 12   12   12     WBC   5.0     Hemoglobin   12.3     Hematocrit   37.8     Platelets   314     Total Cholesterol   163     Triglycerides   118     HDL Cholesterol   54     LDL Cholesterol    88     Hemoglobin A1C 6.4   6.5   6.7                  Assessment and Plan   Diagnoses and all orders for this visit:    1. Medicare annual wellness visit, subsequent (Primary)    2. Type 2 diabetes mellitus without complication, without long-term current use of insulin (HCC)  -     POCT microalbumin    3. Class 1 obesity due to excess calories with serious comorbidity and body mass index (BMI) of 34.0 to 34.9 in adult  Assessment & Plan:  Patient's (Body mass index is 34.55 kg/m².) indicates that they are obese (BMI >30) with health conditions that include hypertension, diabetes mellitus and dyslipidemias . Weight is improving with lifestyle modifications. BMI  is above average; BMI management plan is completed. We discussed portion control and increasing exercise.       4. Anal or rectal pain    5. Rectal spasm    6. Irritable bowel syndrome, unspecified type  -     dicyclomine (BENTYL) 10 MG capsule; Take 1 capsule by mouth 4 (Four) Times a  "Day Before Meals & at Bedtime.  Dispense: 30 capsule; Refill: 0    7. Bacterial vaginosis  -     metroNIDAZOLE (FLAGYL) 500 MG tablet; Take 1 tablet by mouth 2 (Two) Times a Day. Do not use alcohol for at least 24 hours after the last dose.  Dispense: 14 tablet; Refill: 0    The patient was counseled regarding nutrition, physical activity, healthy weight, injury prevention, immunizations and preventative health screenings.  Patient did have third COVID COVID vaccination on 5/23/2022 advised to get bivalent COVID booster.  Otherwise she is up-to-date on immunizations.  She will be due for mammogram on or after August 1.    Diabetes: Have increased Trulicity to 3 mg did have 1 hypoglycemic episode.  Stressed need to not skip meals.  We will continue at current dosing as hypoglycemia recurs.  Recheck A1c in approximately 2 months.    Patient reports she has graduated from her posterior allergy injections and is now taking weekly doses.  She states she does feel better since initiating allergy immunotherapy directed by Dr. Royal Anguiano.    Patient has a history of gut spasms.  She has more recently developed severe spasm in the rectum but this occurs very rarely.  She has found that dicyclomine helps this rectal pain.  She had a prescription last filled in September 2021 and is asking for refill of \"fresh medicine\"    Recurrent bacterial vaginosis, previously diagnosed and treated by Dr. Mas.  Patient currently has some discharge and odor.  She states this typically recurs after intercourse.       Follow Up   Return in about 2 months (around 7/24/2023) for Recheck, diabetes and diabetic foot exam, labs prior.  Patient was given instructions and counseling regarding her condition or for health maintenance advice. Please see specific information pulled into the AVS if appropriate.         "

## 2023-05-24 ENCOUNTER — OFFICE VISIT (OUTPATIENT)
Dept: FAMILY MEDICINE CLINIC | Facility: CLINIC | Age: 68
End: 2023-05-24
Payer: MEDICARE

## 2023-05-24 VITALS
WEIGHT: 195 LBS | DIASTOLIC BLOOD PRESSURE: 78 MMHG | RESPIRATION RATE: 16 BRPM | BODY MASS INDEX: 34.55 KG/M2 | OXYGEN SATURATION: 95 % | SYSTOLIC BLOOD PRESSURE: 128 MMHG | HEIGHT: 63 IN | HEART RATE: 124 BPM | TEMPERATURE: 98 F

## 2023-05-24 DIAGNOSIS — K59.4 RECTAL SPASM: ICD-10-CM

## 2023-05-24 DIAGNOSIS — K62.89 ANAL OR RECTAL PAIN: ICD-10-CM

## 2023-05-24 DIAGNOSIS — B96.89 BACTERIAL VAGINOSIS: ICD-10-CM

## 2023-05-24 DIAGNOSIS — E11.9 TYPE 2 DIABETES MELLITUS WITHOUT COMPLICATION, WITHOUT LONG-TERM CURRENT USE OF INSULIN: ICD-10-CM

## 2023-05-24 DIAGNOSIS — E66.09 CLASS 1 OBESITY DUE TO EXCESS CALORIES WITH SERIOUS COMORBIDITY AND BODY MASS INDEX (BMI) OF 34.0 TO 34.9 IN ADULT: ICD-10-CM

## 2023-05-24 DIAGNOSIS — K58.9 IRRITABLE BOWEL SYNDROME, UNSPECIFIED TYPE: ICD-10-CM

## 2023-05-24 DIAGNOSIS — N76.0 BACTERIAL VAGINOSIS: ICD-10-CM

## 2023-05-24 DIAGNOSIS — Z00.00 MEDICARE ANNUAL WELLNESS VISIT, SUBSEQUENT: Primary | ICD-10-CM

## 2023-05-24 LAB
EXPIRATION DATE: NORMAL
Lab: NORMAL
POC CREATININE URINE: 0
POC MICROALBUMIN URINE: 20

## 2023-05-24 RX ORDER — METRONIDAZOLE 500 MG/1
500 TABLET ORAL 2 TIMES DAILY
Qty: 14 TABLET | Refills: 0 | Status: SHIPPED | OUTPATIENT
Start: 2023-05-24

## 2023-05-24 RX ORDER — DICYCLOMINE HYDROCHLORIDE 10 MG/1
10 CAPSULE ORAL
Qty: 30 CAPSULE | Refills: 0 | Status: SHIPPED | OUTPATIENT
Start: 2023-05-24

## 2023-05-24 NOTE — ASSESSMENT & PLAN NOTE
Patient's (Body mass index is 34.55 kg/m².) indicates that they are obese (BMI >30) with health conditions that include hypertension, diabetes mellitus and dyslipidemias . Weight is improving with lifestyle modifications. BMI  is above average; BMI management plan is completed. We discussed portion control and increasing exercise.

## 2023-06-13 DIAGNOSIS — E03.9 ACQUIRED HYPOTHYROIDISM: ICD-10-CM

## 2023-06-13 RX ORDER — LEVOTHYROXINE SODIUM 88 UG/1
88 TABLET ORAL DAILY
Qty: 90 TABLET | Refills: 1 | Status: SHIPPED | OUTPATIENT
Start: 2023-06-13

## 2023-08-02 ENCOUNTER — ON CAMPUS - OUTPATIENT (AMBULATORY)
Dept: URBAN - METROPOLITAN AREA HOSPITAL 2 | Facility: HOSPITAL | Age: 68
End: 2023-08-02

## 2023-08-02 ENCOUNTER — OFFICE (AMBULATORY)
Dept: URBAN - METROPOLITAN AREA PATHOLOGY 4 | Facility: PATHOLOGY | Age: 68
End: 2023-08-02
Payer: COMMERCIAL

## 2023-08-02 ENCOUNTER — OFFICE (AMBULATORY)
Dept: URBAN - METROPOLITAN AREA PATHOLOGY 4 | Facility: PATHOLOGY | Age: 68
End: 2023-08-02

## 2023-08-02 VITALS
SYSTOLIC BLOOD PRESSURE: 110 MMHG | HEART RATE: 84 BPM | HEART RATE: 87 BPM | OXYGEN SATURATION: 96 % | DIASTOLIC BLOOD PRESSURE: 76 MMHG | SYSTOLIC BLOOD PRESSURE: 136 MMHG | DIASTOLIC BLOOD PRESSURE: 86 MMHG | SYSTOLIC BLOOD PRESSURE: 132 MMHG | RESPIRATION RATE: 12 BRPM | DIASTOLIC BLOOD PRESSURE: 89 MMHG | HEART RATE: 88 BPM | HEART RATE: 81 BPM | RESPIRATION RATE: 17 BRPM | HEART RATE: 95 BPM | SYSTOLIC BLOOD PRESSURE: 111 MMHG | DIASTOLIC BLOOD PRESSURE: 97 MMHG | OXYGEN SATURATION: 98 % | SYSTOLIC BLOOD PRESSURE: 131 MMHG | RESPIRATION RATE: 16 BRPM | DIASTOLIC BLOOD PRESSURE: 67 MMHG | RESPIRATION RATE: 20 BRPM | DIASTOLIC BLOOD PRESSURE: 74 MMHG | DIASTOLIC BLOOD PRESSURE: 77 MMHG | OXYGEN SATURATION: 99 % | SYSTOLIC BLOOD PRESSURE: 105 MMHG | TEMPERATURE: 97.9 F | OXYGEN SATURATION: 93 % | OXYGEN SATURATION: 100 % | SYSTOLIC BLOOD PRESSURE: 117 MMHG | SYSTOLIC BLOOD PRESSURE: 135 MMHG | HEIGHT: 63 IN | WEIGHT: 188 LBS | DIASTOLIC BLOOD PRESSURE: 91 MMHG | HEART RATE: 92 BPM | OXYGEN SATURATION: 97 %

## 2023-08-02 DIAGNOSIS — D12.5 BENIGN NEOPLASM OF SIGMOID COLON: ICD-10-CM

## 2023-08-02 DIAGNOSIS — Z86.010 PERSONAL HISTORY OF COLONIC POLYPS: ICD-10-CM

## 2023-08-02 DIAGNOSIS — K64.1 SECOND DEGREE HEMORRHOIDS: ICD-10-CM

## 2023-08-02 PROBLEM — K63.5 POLYP OF COLON: Status: ACTIVE | Noted: 2023-08-02

## 2023-08-02 LAB
GI HISTOLOGY: A. UNSPECIFIED: (no result)
GI HISTOLOGY: B. UNSPECIFIED: (no result)
GI HISTOLOGY: PDF REPORT: (no result)

## 2023-08-02 PROCEDURE — 88305 TISSUE EXAM BY PATHOLOGIST: CPT | Mod: 26 | Performed by: INTERNAL MEDICINE

## 2023-08-02 PROCEDURE — 45380 COLONOSCOPY AND BIOPSY: CPT | Mod: 59,PT | Performed by: INTERNAL MEDICINE

## 2023-08-02 PROCEDURE — 45385 COLONOSCOPY W/LESION REMOVAL: CPT | Mod: PT | Performed by: INTERNAL MEDICINE

## 2023-08-02 PROCEDURE — 45380 COLONOSCOPY AND BIOPSY: CPT | Mod: PT,59 | Performed by: INTERNAL MEDICINE

## 2023-08-02 RX ADMIN — ONDANSETRON HYDROCHLORIDE 4 MG: 4 SOLUTION ORAL at 10:33

## 2023-08-08 NOTE — PROGRESS NOTES
Chief Complaint  Diabetes and Hypothyroidism    History of Present Illness  Johanna Swan is scheduled today for follow-up on chronic medical conditions including diabetes, hyperlipidemia and hypothyroidism.  However, her biggest concern is trouble walking.    Diabetes  Patient does check blood sugar at home  1-2  times daily.  Doing well with Trulicity, no longer having hypoglycemia.  Per patient fasting blood sugars range between   Associated symptoms include Fatigue  Per patient current diet is Well Balanced.  Patient try's to avoid concentrated sweets.  Patient does see podiatry. Dr. Rai  Patient see's [facility] for diabetic eye exam and last eye exam was within the last year.    Hypothyroidism  Patient presents for evaluation of thyroid function. Symptoms consist of fatigue, change in skin,  nails, or hair, and night sweats. Symptoms have present for several years. The symptoms are moderate.  The problem has been unchanged.  The hypothyroidism is due to hypothyroidism.    Having trouble walking.  Legs are very stiff when she first gets up, improves after about 15 or 20 minutes. Feel like pop out of place  Did go to [facility], did PRP not sure if it helped.   Have had meniscal repair bilaterally with Dr Boone in 2017.  Increased pain in knees bilaterally massage has been helpful,  Had lumbar nerve ablation with Dr Gr 2 weeks ago, do not think is related.       Current Outpatient Medications on File Prior to Visit   Medication Sig    albuterol sulfate  (90 Base) MCG/ACT inhaler Inhale 2 puffs Every 6 (Six) Hours As Needed for Wheezing.    Amitiza 24 MCG capsule Take 1 capsule by mouth 2 (Two) Times a Day.    Azelastine HCl 137 MCG/SPRAY solution USE 1 TO 2 SPRAY(S) IN EACH NOSTRIL TWICE DAILY AS NEEDED FOR DRAINAGE, SNEEZING OR COUGH    benazepril (LOTENSIN) 10 MG tablet TAKE 1 TABLET DAILY        (INCREASED)    dicyclomine (BENTYL) 10 MG capsule Take 1 capsule by mouth 4 (Four) Times a  Day Before Meals & at Bedtime.    Dulaglutide (Trulicity) 3 MG/0.5ML solution pen-injector Inject 0.5 mL under the skin into the appropriate area as directed 1 (One) Time Per Week.    DULoxetine (CYMBALTA) 60 MG capsule Take 1 capsule by mouth Daily.    Erenumab-aooe (AIMOVIG) 140 MG/ML prefilled syringe Inject 0.0071 mL under the skin into the appropriate area as directed Every 30 (Thirty) Days.    esomeprazole (nexIUM) 40 MG capsule Take 1 capsule by mouth Daily.    glucose blood (OneTouch Verio) test strip Use as instructed to check glucose once daily    glucose blood (OneTouch Verio) test strip Use as instructed    guaifenesin-dextromethorphan (MUCINEX DM)  MG tablet sustained-release 12 hour tablet Take 1 tablet by mouth 2 (Two) Times a Day As Needed (cough and congestion).    HYDROcodone-acetaminophen (NORCO) 5-325 MG per tablet Take 1 tablet by mouth 2 (Two) Times a Day As Needed.    hydrOXYzine (ATARAX) 25 MG tablet Take 1 tablet by mouth 3 (Three) Times a Day As Needed for Itching.    levothyroxine (Synthroid) 88 MCG tablet Take 1 tablet by mouth Daily.    lidocaine (XYLOCAINE) 5 % ointment APPLY .5 G OF CREAM TO AFFECTED AREA NOT EXCEEDING MORE THEN 13 TUBE PER DAY.    Lidocaine-Tetracaine 7-7 % cream APPLY TO AFFECTED AREA TWICE DAILY AS NEEDED    Lyrica 150 MG capsule Take 1 capsule by mouth 2 (Two) Times a Day.    metoprolol succinate XL (TOPROL-XL) 25 MG 24 hr tablet Take 1 tablet by mouth Daily.    metroNIDAZOLE (FLAGYL) 500 MG tablet Take 1 tablet by mouth 2 (Two) Times a Day. Do not use alcohol for at least 24 hours after the last dose.    mometasone (Elocon) 0.1 % cream Apply to rash twice daily as needed    mometasone (NASONEX) 50 MCG/ACT nasal spray 2 sprays into the nostril(s) as directed by provider Daily.    ondansetron (Zofran) 4 MG tablet 1-2 tablets every 6-8 hours if needed.    OneTouch Verio test strip USE AND DISCARD 1 TEST     STRIP DAILY AS DIRECTED    rizatriptan (MAXALT) 10 MG  "tablet Take 1 tablet by mouth 1 (One) Time As Needed for Migraine (May repeat in 2 hours if needed for persistent headache).    rosuvastatin (CRESTOR) 5 MG tablet Take 1 tablet by mouth Daily.    tamsulosin (FLOMAX) 0.4 MG capsule 24 hr capsule Take 1 capsule by mouth Daily.    tiZANidine (ZANAFLEX) 4 MG tablet Take 1 tablet by mouth Every Night.     No current facility-administered medications on file prior to visit.       Objective   Vital Signs:   /68 (BP Location: Right arm, Patient Position: Sitting, Cuff Size: Adult)   Pulse 95   Temp 98.7 øF (37.1 øC) (Temporal)   Resp 18   Ht 160 cm (63\")   Wt 87.7 kg (193 lb 6 oz)   SpO2 95% Comment: room air  BMI 34.25 kg/mý       Physical Exam  Vitals and nursing note reviewed.   Constitutional:       General: She is not in acute distress.     Appearance: She is well-developed. She is obese. She is not ill-appearing.   HENT:      Head: Normocephalic and atraumatic.   Cardiovascular:      Rate and Rhythm: Normal rate and regular rhythm.      Heart sounds: No murmur heard.  Pulmonary:      Effort: Pulmonary effort is normal.      Breath sounds: Normal breath sounds. No wheezing.   Musculoskeletal:         General: Normal range of motion.      Cervical back: Normal range of motion and neck supple.      Comments: Very antalgic gait, at least for the first several yards she does not fully extend her legs at her knees.  There is arthritic changes including extension limited to about 170 degrees bilaterally.  Positive crepitus, no joint laxity.   Lymphadenopathy:      Cervical: No cervical adenopathy.   Skin:     General: Skin is warm and dry.      Findings: No rash.   Neurological:      Mental Status: She is alert and oriented to person, place, and time.   Psychiatric:         Mood and Affect: Mood normal.         Behavior: Behavior normal.         Thought Content: Thought content normal.          No visits with results within 1 Day(s) from this visit.   Latest " known visit with results is:   Results Encounter on 07/16/2023   Component Date Value Ref Range Status    Hemoglobin A1C 07/20/2023 6.3 (H)  4.8 - 5.6 % Final    Comment:          Prediabetes: 5.7 - 6.4           Diabetes: >6.4           Glycemic control for adults with diabetes: <7.0      Total Cholesterol 07/20/2023 182  100 - 199 mg/dL Final    Triglycerides 07/20/2023 184 (H)  0 - 149 mg/dL Final    HDL Cholesterol 07/20/2023 51  >39 mg/dL Final    VLDL Cholesterol Silverio 07/20/2023 32  5 - 40 mg/dL Final    LDL Chol Calc (NIH) 07/20/2023 99  0 - 99 mg/dL Final    TSH 07/20/2023 1.430  0.450 - 4.500 uIU/mL Final    Free T4 07/20/2023 1.15  0.82 - 1.77 ng/dL Final    Glucose 07/20/2023 110 (H)  70 - 99 mg/dL Final    BUN 07/20/2023 16  8 - 27 mg/dL Final    Creatinine 07/20/2023 0.88  0.57 - 1.00 mg/dL Final    EGFR Result 07/20/2023 72  >59 mL/min/1.73 Final    BUN/Creatinine Ratio 07/20/2023 18  12 - 28 Final    Sodium 07/20/2023 141  134 - 144 mmol/L Final    Potassium 07/20/2023 4.2  3.5 - 5.2 mmol/L Final    Chloride 07/20/2023 102  96 - 106 mmol/L Final    Total CO2 07/20/2023 22  20 - 29 mmol/L Final    Calcium 07/20/2023 9.2  8.7 - 10.3 mg/dL Final    Total Protein 07/20/2023 6.8  6.0 - 8.5 g/dL Final    Albumin 07/20/2023 4.3  3.9 - 4.9 g/dL Final                  **Please note reference interval change**    Globulin 07/20/2023 2.5  1.5 - 4.5 g/dL Final    A/G Ratio 07/20/2023 1.7  1.2 - 2.2 Final    Total Bilirubin 07/20/2023 0.8  0.0 - 1.2 mg/dL Final    Alkaline Phosphatase 07/20/2023 96  44 - 121 IU/L Final    AST (SGOT) 07/20/2023 13  0 - 40 IU/L Final    ALT (SGPT) 07/20/2023 16  0 - 32 IU/L Final     A1C Last 3 Results          12/14/2022    11:30 4/21/2023    11:12 7/20/2023    10:17   HGBA1C Last 3 Results   Hemoglobin A1C 6.5  6.7  6.3      Lab Results   Component Value Date    CHLPL 182 07/20/2023    TRIG 184 (H) 07/20/2023    HDL 51 07/20/2023    LDL 99 07/20/2023     Lab Results   Component  Value Date    TSH 1.430 07/20/2023     Lab Results   Component Value Date    GLUCOSE 110 (H) 07/20/2023    BUN 16 07/20/2023    CREATININE 0.88 07/20/2023    EGFRIFNONA 70 02/21/2022    EGFRIFAFRI 81 02/21/2022    BCR 18 07/20/2023    K 4.2 07/20/2023    CO2 22 07/20/2023    CALCIUM 9.2 07/20/2023    PROTENTOTREF 6.8 07/20/2023    ALBUMIN 4.3 07/20/2023    LABIL2 1.7 07/20/2023    AST 13 07/20/2023    ALT 16 07/20/2023     Lab Results   Component Value Date    WBC 5.0 04/21/2023    HGB 12.3 04/21/2023    HCT 37.8 04/21/2023    MCV 87 04/21/2023     04/21/2023                      Assessment and Plan    Diagnoses and all orders for this visit:    1. Type 2 diabetes mellitus without complication, without long-term current use of insulin (Primary)  -     Hemoglobin A1c  -     Comprehensive Metabolic Panel    2. Acquired hypothyroidism    3. Class 1 obesity due to excess calories with serious comorbidity and body mass index (BMI) of 34.0 to 34.9 in adult  Assessment & Plan:  Patient's (Body mass index is 34.25 kg/mý.) indicates that they are obese (BMI >30) with health conditions that include hypertension, diabetes mellitus, dyslipidemias, GERD, and osteoarthritis . Weight is unchanged. BMI  is above average; BMI management plan is completed. We discussed portion control and increasing exercise.       4. Osteoarthritis of both knees, unspecified osteoarthritis type  -     Ambulatory Referral to Orthopedic Surgery  -     XR Knee 3 View Bilateral    5. Mixed hyperlipidemia    6. Essential hypertension    7. Encounter for screening mammogram for breast cancer  -     Mammo Screening Digital Tomosynthesis Bilateral With CAD; Future      Increased knee pain with history of bilateral arthritis that is significantly reducing ambulation.  Patient declines referral to physical therapy stating it has caused her pain in the past.  I am ordering x-rays today and referring to new orthopedist for consideration of Hyalgan  injections.    Diabetes, cholesterol, thyroid, and hypertension all at treatment goals, continue current therapies.  Will recheck A1c in 3 months.    There are no discontinued medications.      Follow Up     Return in about 3 months (around 11/9/2023) for Recheck, with Labs, diabetes and knee pain .    Patient was given instructions and counseling regarding her condition or for health maintenance advice. Please see specific information pulled into the AVS if appropriate.

## 2023-08-09 ENCOUNTER — OFFICE VISIT (OUTPATIENT)
Dept: FAMILY MEDICINE CLINIC | Facility: CLINIC | Age: 68
End: 2023-08-09
Payer: MEDICARE

## 2023-08-09 ENCOUNTER — HOSPITAL ENCOUNTER (OUTPATIENT)
Dept: GENERAL RADIOLOGY | Facility: HOSPITAL | Age: 68
Discharge: HOME | End: 2023-08-09
Admitting: FAMILY MEDICINE
Payer: MEDICARE

## 2023-08-09 VITALS
DIASTOLIC BLOOD PRESSURE: 68 MMHG | SYSTOLIC BLOOD PRESSURE: 126 MMHG | RESPIRATION RATE: 18 BRPM | HEART RATE: 95 BPM | TEMPERATURE: 98.7 F | WEIGHT: 193.38 LBS | HEIGHT: 63 IN | OXYGEN SATURATION: 95 % | BODY MASS INDEX: 34.27 KG/M2

## 2023-08-09 DIAGNOSIS — E66.09 CLASS 1 OBESITY DUE TO EXCESS CALORIES WITH SERIOUS COMORBIDITY AND BODY MASS INDEX (BMI) OF 34.0 TO 34.9 IN ADULT: ICD-10-CM

## 2023-08-09 DIAGNOSIS — E03.9 ACQUIRED HYPOTHYROIDISM: ICD-10-CM

## 2023-08-09 DIAGNOSIS — Z12.31 ENCOUNTER FOR SCREENING MAMMOGRAM FOR BREAST CANCER: ICD-10-CM

## 2023-08-09 DIAGNOSIS — M17.0 OSTEOARTHRITIS OF BOTH KNEES, UNSPECIFIED OSTEOARTHRITIS TYPE: ICD-10-CM

## 2023-08-09 DIAGNOSIS — E11.9 TYPE 2 DIABETES MELLITUS WITHOUT COMPLICATION, WITHOUT LONG-TERM CURRENT USE OF INSULIN: Primary | ICD-10-CM

## 2023-08-09 DIAGNOSIS — E78.2 MIXED HYPERLIPIDEMIA: ICD-10-CM

## 2023-08-09 DIAGNOSIS — I10 ESSENTIAL HYPERTENSION: ICD-10-CM

## 2023-08-09 PROCEDURE — 73562 X-RAY EXAM OF KNEE 3: CPT

## 2023-08-09 NOTE — ASSESSMENT & PLAN NOTE
Patient's (Body mass index is 34.25 kg/mý.) indicates that they are obese (BMI >30) with health conditions that include hypertension, diabetes mellitus, dyslipidemias, GERD, and osteoarthritis . Weight is unchanged. BMI  is above average; BMI management plan is completed. We discussed portion control and increasing exercise.

## 2023-08-10 ENCOUNTER — HOSPITAL ENCOUNTER (OUTPATIENT)
Dept: MAMMOGRAPHY | Facility: HOSPITAL | Age: 68
Discharge: HOME | End: 2023-08-10
Admitting: FAMILY MEDICINE
Payer: MEDICARE

## 2023-08-10 DIAGNOSIS — Z12.31 ENCOUNTER FOR SCREENING MAMMOGRAM FOR BREAST CANCER: ICD-10-CM

## 2023-08-10 PROCEDURE — 77063 BREAST TOMOSYNTHESIS BI: CPT

## 2023-08-10 PROCEDURE — 77067 SCR MAMMO BI INCL CAD: CPT

## 2023-08-28 DIAGNOSIS — E11.9 TYPE 2 DIABETES MELLITUS WITHOUT COMPLICATION, WITHOUT LONG-TERM CURRENT USE OF INSULIN: ICD-10-CM

## 2023-08-29 RX ORDER — DULAGLUTIDE 3 MG/.5ML
3 INJECTION, SOLUTION SUBCUTANEOUS WEEKLY
Qty: 6 ML | Refills: 3 | Status: SHIPPED | OUTPATIENT
Start: 2023-08-29

## 2023-09-05 ENCOUNTER — OFFICE VISIT (OUTPATIENT)
Dept: ORTHOPEDIC SURGERY | Facility: CLINIC | Age: 68
End: 2023-09-05
Payer: MEDICARE

## 2023-09-05 VITALS — RESPIRATION RATE: 20 BRPM | BODY MASS INDEX: 34.2 KG/M2 | HEIGHT: 63 IN | WEIGHT: 193 LBS

## 2023-09-05 DIAGNOSIS — M17.12 PRIMARY OSTEOARTHRITIS OF LEFT KNEE: ICD-10-CM

## 2023-09-05 DIAGNOSIS — M17.11 PRIMARY OSTEOARTHRITIS OF RIGHT KNEE: Primary | ICD-10-CM

## 2023-09-05 NOTE — PROGRESS NOTES
"Alberta is a 67 y.o. year old female presents to [facility]    Chief Complaint   Patient presents with    Left Knee - Pain, Initial Evaluation     Pain-7        Right Knee - Pain, Initial Evaluation     Pain- 5       History of Present Illness  Johanna Swan is a 67 y.o. female presents to clinic with her , Angel, for evaluation of bilateral knee pain, historically left greater than right, secondary to known osteoarthritis. Past treatments: Bilateral knee meniscal surgery with Dr Boone, in 2017. Shortly after this in 2018 she fell in a [place] and struck her right knee causing pain to return. More recently she went [facility] in [place] where she had right knee PRP in 2022. She received a bilateral knee steroid injection with [facility], [place], in June 2023.     I have reviewed the patient's medical, family, and social history in detail and updated the computerized patient record.    Objective:  Resp 20   Ht 160 cm (63\")   Wt 87.5 kg (193 lb)   LMP  (LMP Unknown)   BMI 34.19 kg/m²      Physical Exam    Vital signs reviewed.   General: No acute distress.  Eyes: conjunctiva clear  ENT: external ears atraumatic  CV: no peripheral edema  Resp: normal respiratory effort  Skin: no rashes or wounds; normal turgor  Psych: mood and affect appropriate; recent and remote memory intact  Neuro: sensation to light touch intact    MSK Exam    Antalgic gait    Right knee:  Intact skin. No effusion.   No ecchymosis  Tenderness over the medial joint line  Mild laxity with varus and valgus stress  Extension 0, flexion 120    Left knee:  Intact skin. No effusion. No ecchymosis  Over the medial and lateral joint lines  Extension 0, flexion 125  Mild laxity with varus stress      Imaging:  XR Knee 3 View Bilateral (08/09/2023 14:04)   Findings:    Right knee: There is joint space narrowing in the medial compartment with marked irregularity of the articular surfaces " in the medial compartment and to a lesser degree the lateral compartment. There is spur formation along the intercondylar notch and   medial lateral joint line as well as advanced patellofemoral joint disease. There is a small joint effusion.    Left knee: There is joint space narrowing predominantly in the medial compartment with some compensatory widening of the lateral compartment. Marginal spur formation is identified medially, laterally and along the intercondylar notch and there is advanced patellofemoral joint disease. There is a small joint effusion.     IMPRESSION:  :  Advanced bilateral tricompartmental osteoarthritis    Assessment:  Primary Osteoarthritis of right knee ICD-10 M17.11    Primary Osteoarthritis of left knee ICD-10 M17.12      Plan: Recommend PT to address lumbar spine stenosis, bilateral knee OA, and left iliotibial band tightness. Also, recommend HA to address bilateral knee OA and to provide better mobility and subjective pain relief.  Follow-up once approved.      Follow Up   No follow-ups on file.  Patient was given instructions and counseling regarding her condition or for health maintenance advice. Please see specific information pulled into the AVS if appropriate.     EMR Dragon/Transcription disclaimer:    Much of this encounter note is an electronic transcription/translation of spoken language to printed text.  The electronic translation of spoken language may permit erroneous, or at times, nonsensical words or phrases to be inadvertently transcribed.  Although I have reviewed the note for such errors some may still exist.

## 2023-09-06 ENCOUNTER — PATIENT ROUNDING (BHMG ONLY) (OUTPATIENT)
Dept: ORTHOPEDIC SURGERY | Facility: CLINIC | Age: 68
End: 2023-09-06
Payer: MEDICARE

## 2023-09-12 ENCOUNTER — TELEPHONE (OUTPATIENT)
Dept: ORTHOPEDIC SURGERY | Facility: CLINIC | Age: 68
End: 2023-09-12

## 2023-09-12 NOTE — TELEPHONE ENCOUNTER
"Caller: Johanna Swan \"Dennis\"    Relationship to patient: Self    Best call back number: [phone] (home)       Chief complaint: BRIANNA KNEE    Type of visit: INJ     Requested date:      If rescheduling, when is the original appointment:      Additional notes: PATIENT WANTED TO KNOW IF THE SERIES INJ PA HAS BEEN DONE AND/OR APPROVED BY HER INSURANCE COMPANY YET.            "

## 2023-09-14 NOTE — TELEPHONE ENCOUNTER
Called#1 Called to advised patient request for Gel injection has been submitted waiting for respond. Left voicemail.

## 2023-09-26 NOTE — TELEPHONE ENCOUNTER
DOMINGUEZ FROM [employer] NEEDING A CALL BACK FROM [place]I [phone] THIS IS REGARDING PATIENT SECONDARY INSURANCE.

## 2023-09-28 NOTE — PROGRESS NOTES
Office Note     Name: Johanna Swan    : 1955     MRN: [id number]     Chief Complaint  URI    Subjective     Johanna Swan presents to [facility] for an acute complaint of URI and sore throat.    History of Present Illness  Patient began feeling sick last Thursday. Her grandkids and  have been sick as well. She has bene taking OTC tylenol, cough drops and using her inhaler  URI   This is a new problem. The current episode started in the past 7 days. The problem has been unchanged. There has been no fever. Associated symptoms include abdominal pain, congestion, coughing, ear pain and a sore throat. Pertinent negatives include no chest pain, diarrhea, nausea, vomiting or wheezing. She has tried decongestant for the symptoms. The treatment provided mild relief.     Upper respiratory infection  The patient had onset of pharyngitis, aphonia, tingling in her sinuses and throat, cough, myalgia, bilateral rhinorrhea, post-nasal drainage, severe headache, fatigue, and general malaise on 2023.   She is not maintaining proper hydration.   Her cough was initially productive of thin, watery mucus when she was lying down.  She took Mucinex DM 2 tablets and she is currently producing green mucus.  She notes the Mucinex DM was .  She denies shortness of breath when ambulating.    Allergies  She has numerous environmental allergies, for which she receives allergy injections.   Her most recent allergy injections were administered on 2023.   She uses Nasonex and albuterol inhaler.  She used her albuterol inhaler once on 2023 and once on 2023.  Her most recent sinus infection was 1 year ago, for which she was prescribed a Z-Carrillo.    Hearing loss  The patient has had 2 COVID-19 infections.  She developed associated hearing loss.  She has intermittent excess cerumen.  She had an MRI of her head.  She has seen an ear, nose, and throat specialist and  she was informed that she had fullness in her bilateral ears.  She was counseled on a surgical procedure; however she was not guaranteed that this would restore her hearing.    Migraine  She has migraines.   She administered her Aimovig injection on 09/28/2023.   Her headaches have improved.   She has had regular migraines for 20 years and administers Aimovig every 28 days.    Type 2 diabetes mellitus  Her blood glucose was poorly controlled recently as she missed an injection and underwent a nerve ablation in her neck.  Her blood glucose on 09/28/2023 was 80 mg/dL.  She has been experiencing diaphoresis.  She takes Trulicity.  She does not keep anything on hand in case of hypoglycemia.  Her hemoglobin A1c has  been steadily increasing, most recently from 6.3 to 6.4.      Current Outpatient Medications:     albuterol sulfate  (90 Base) MCG/ACT inhaler, Inhale 2 puffs Every 6 (Six) Hours As Needed for Wheezing., Disp: 18 g, Rfl: 3    Amitiza 24 MCG capsule, Take 1 capsule by mouth 2 (Two) Times a Day., Disp: , Rfl: 3    Azelastine HCl 137 MCG/SPRAY solution, USE 1 TO 2 SPRAY(S) IN EACH NOSTRIL TWICE DAILY AS NEEDED FOR DRAINAGE, SNEEZING OR COUGH, Disp: , Rfl:     benazepril (LOTENSIN) 10 MG tablet, TAKE 1 TABLET DAILY        (INCREASED), Disp: 90 tablet, Rfl: 3    dicyclomine (BENTYL) 10 MG capsule, Take 1 capsule by mouth 4 (Four) Times a Day Before Meals & at Bedtime., Disp: 30 capsule, Rfl: 0    Dulaglutide (Trulicity) 3 MG/0.5ML solution pen-injector, Inject 0.5 mL under the skin into the appropriate area as directed 1 (One) Time Per Week., Disp: 6 mL, Rfl: 3    DULoxetine (CYMBALTA) 60 MG capsule, Take 1 capsule by mouth Daily., Disp: , Rfl:     Erenumab-aooe (AIMOVIG) 140 MG/ML prefilled syringe, Inject 0.0071 mL under the skin into the appropriate area as directed Every 30 (Thirty) Days., Disp: , Rfl:     esomeprazole (nexIUM) 40 MG capsule, Take 1 capsule by mouth Daily., Disp: , Rfl:     glucose  blood (OneTouch Verio) test strip, Use as instructed to check glucose once daily, Disp: 50 each, Rfl: 12    glucose blood (OneTouch Verio) test strip, Use as instructed, Disp: 100 each, Rfl: 3    guaifenesin-dextromethorphan (MUCINEX DM)  MG tablet sustained-release 12 hour tablet, Take 1 tablet by mouth 2 (Two) Times a Day As Needed (cough and congestion)., Disp: 20 tablet, Rfl: 1    HYDROcodone-acetaminophen (NORCO) 5-325 MG per tablet, Take 1 tablet by mouth 2 (Two) Times a Day As Needed., Disp: , Rfl:     hydrOXYzine (ATARAX) 25 MG tablet, Take 1 tablet by mouth 3 (Three) Times a Day As Needed for Itching., Disp: , Rfl:     levothyroxine (Synthroid) 88 MCG tablet, Take 1 tablet by mouth Daily., Disp: 90 tablet, Rfl: 1    lidocaine (XYLOCAINE) 5 % ointment, APPLY .5 G OF CREAM TO AFFECTED AREA NOT EXCEEDING MORE THEN 13 TUBE PER DAY., Disp: , Rfl:     Lidocaine-Tetracaine 7-7 % cream, APPLY TO AFFECTED AREA TWICE DAILY AS NEEDED, Disp: , Rfl:     Lyrica 150 MG capsule, Take 1 capsule by mouth 2 (Two) Times a Day., Disp: , Rfl:     metoprolol succinate XL (TOPROL-XL) 25 MG 24 hr tablet, Take 1 tablet by mouth Daily., Disp: 90 tablet, Rfl: 3    mometasone (Elocon) 0.1 % cream, Apply to rash twice daily as needed, Disp: 50 g, Rfl: 1    mometasone (NASONEX) 50 MCG/ACT nasal spray, 2 sprays into the nostril(s) as directed by provider Daily., Disp: 3 each, Rfl: 3    ondansetron (Zofran) 4 MG tablet, 1-2 tablets every 6-8 hours if needed., Disp: 30 tablet, Rfl: 1    OneTouch Verio test strip, USE AND DISCARD 1 TEST     STRIP DAILY AS DIRECTED, Disp: 100 each, Rfl: 3    rizatriptan (MAXALT) 10 MG tablet, Take 1 tablet by mouth 1 (One) Time As Needed for Migraine (May repeat in 2 hours if needed for persistent headache)., Disp: 9 tablet, Rfl: 5    rosuvastatin (CRESTOR) 5 MG tablet, Take 1 tablet by mouth Daily., Disp: 90 tablet, Rfl: 1    tamsulosin (FLOMAX) 0.4 MG capsule 24 hr capsule, Take 1 capsule by mouth  Daily., Disp: , Rfl: 11    tiZANidine (ZANAFLEX) 4 MG tablet, Take 1 tablet by mouth Every Night., Disp: , Rfl:     azithromycin (Zithromax Z-Carrillo) 250 MG tablet, Take 2 tablets by mouth on day 1, then 1 tablet daily on days 2-5, Disp: 6 tablet, Rfl: 0    Not on File    Past Surgical History:   Procedure Laterality Date    BLADDER SURGERY  12/2005    bladder sling    COLONOSCOPY  05/31/2018    Dr. Kent    HYSTERECTOMY  12/2005    KNEE ARTHROSCOPY Left 02/27/2017    Dr. Boone    KNEE ARTHROSCOPY Right 01/29/2018    Dr. Boone    TUBAL ABDOMINAL LIGATION  12/2005        Family History   Problem Relation Age of Onset    Asthma Mother         Nasal polyps    Migraines Mother     Kidney disease Mother     Hyperlipidemia Father     Thyroid cancer Father     Heart failure Father     Cancer Father         Thyroid    Heart disease Father     Thyroid disease Father     Thyroid disease Sister     Autoimmune disease Sister     Glaucoma Sister     Drug abuse Sister     Hyperlipidemia Sister     Macular degeneration Sister     Heart failure Sister     Early death Sister     Stroke Sister     Vision loss Sister     Other Sister         Homocystinuria    Arthritis Sister         Rheumatoid    Heart disease Sister     Arthritis Sister         Rheumatoid    Drug abuse Sister     Diabetes Sister     Heart disease Sister     Early death Sister     Drug abuse Brother     Hyperlipidemia Brother     Kidney disease Brother     Heart disease Brother     Thyroid disease Brother     Hyperlipidemia Brother     Anxiety disorder Daughter     Diabetes Other     Heart disease Other     Hypertension Other     Hyperlipidemia Other     Cancer Other     Drug abuse Brother     Early death Brother     Breast cancer Neg Hx     Ovarian cancer Neg Hx             5/24/2023     1:00 PM   PHQ-2/PHQ-9 Depression Screening   Little Interest or Pleasure in Doing Things 0-->not at all   Feeling Down, Depressed or Hopeless 0-->not at all   Trouble Falling or  "Staying Asleep, or Sleeping Too Much 0-->not at all   Feeling Tired or Having Little Energy 2-->more than half the days   Poor Appetite or Overeating 0-->not at all   Feeling Bad about Yourself - or that You are a Failure or Have Let Yourself or Your Family Down 0-->not at all   Trouble Concentrating on Things, Such as Reading the Newspaper or Watching Television 2-->more than half the days   Moving or Speaking So Slowly that Other People Could Have Noticed? Or the Opposite - Being So Fidgety 0-->not at all   Thoughts that You Would be Better Off Dead or of Hurting Yourself in Some Way 0-->not at all   PHQ-9: Brief Depression Severity Measure Score 4   If You Checked Off Any Problems, How Difficult Have These Problems Made It For You to Do Your Work, Take Care of Things at Home, or Get Along with Other People? not difficult at all       Review of Systems   Constitutional:  Positive for activity change and fatigue. Negative for appetite change, chills and fever.   HENT:  Positive for congestion, ear pain and sore throat.    Respiratory:  Positive for cough. Negative for shortness of breath and wheezing.    Cardiovascular:  Negative for chest pain, palpitations and leg swelling.   Gastrointestinal:  Positive for abdominal pain. Negative for constipation, diarrhea, nausea and vomiting.   Allergic/Immunologic: Positive for environmental allergies.   Neurological: Negative.      Objective     /80 (BP Location: Left arm, Patient Position: Sitting, Cuff Size: Adult)   Pulse 89   Resp 18   Ht 160 cm (62.99\")   Wt 83.9 kg (185 lb)   LMP  (LMP Unknown)   SpO2 99%   BMI 32.78 kg/m²     BP Readings from Last 2 Encounters:   09/29/23 112/80   08/09/23 126/68       Wt Readings from Last 2 Encounters:   09/29/23 83.9 kg (185 lb)   09/05/23 87.5 kg (193 lb)                Physical Exam  HENT:      Head: Normocephalic and atraumatic.      Right Ear: Ear canal and external ear normal. A middle ear effusion is present.    "   Left Ear: Ear canal and external ear normal. A middle ear effusion is present.      Nose: Nose normal.      Right Sinus: Maxillary sinus tenderness present.      Left Sinus: Maxillary sinus tenderness present.      Mouth/Throat:      Lips: Pink.      Pharynx: Uvula midline.   Eyes:      General: Lids are normal. Vision grossly intact.   Neck:      Vascular: No carotid bruit.   Cardiovascular:      Rate and Rhythm: Normal rate and regular rhythm.      Heart sounds: Normal heart sounds.   Pulmonary:      Effort: Pulmonary effort is normal.      Breath sounds: Normal breath sounds and air entry.   Skin:     General: Skin is warm and dry.   Neurological:      Mental Status: She is oriented to person, place, and time.   Psychiatric:         Mood and Affect: Mood normal.         Behavior: Behavior normal.     Derm Physical Exam  Result Review :   Assessment and Plan         Problems Addressed this Visit    None  Visit Diagnoses       Upper respiratory tract infection, unspecified type    -  Primary    Relevant Medications    azithromycin (Zithromax Z-Carrillo) 250 MG tablet    Other Relevant Orders    POCT SARS-CoV-2 Antigen MARIAN + Flu (Completed)    POCT rapid strep A (Completed)    Acute recurrent pansinusitis              Diagnoses         Codes Comments    Upper respiratory tract infection, unspecified type    -  Primary ICD-10-CM: J06.9  ICD-9-CM: 465.9     Acute recurrent pansinusitis     ICD-10-CM: J01.41  ICD-9-CM: 461.8            Procedures    Problem List Items Addressed This Visit    None  Visit Diagnoses       Upper respiratory tract infection, unspecified type    -  Primary    Relevant Medications    azithromycin (Zithromax Z-Carrillo) 250 MG tablet    Other Relevant Orders    POCT SARS-CoV-2 Antigen MARIAN + Flu (Completed)    POCT rapid strep A (Completed)    Acute recurrent pansinusitis                     Wrapup Tab  No follow-ups on file.     Upper respiratory tract infection  Continue Mucinex DM.  A Z-Carrillo will be  sent to the patient's pharmacy.  Follow up with primary care provider if symptoms persist or worsen.    Acute recurrent pansinusitis  We discussed the use of a daily allergy medication until allergy injections have reached full efficacy.    There are no Patient Instructions on file for this visit.   Patient was given instructions and counseling regarding her condition or for health maintenance advice. Please see specific information pulled into the AVS if appropriate.  Hand hygiene was performed during entrance to exam room and following assessment of patient. This document is intended for medical expert use only.     EMR Dragon/Transcription disclaimer:   Much of this encounter note is an electronic transcription/translation of spoken language to printed text. The electronic translation of spoken language may permit erroneous, or at times, nonsensical words or phrases to be inadvertently transcribed.      VALENTINA Yoder, FNP-C  [address]  [facility]  [address] [place] [address] [address]    Transcribed from ambient dictation for VALENTINA Yoder by Darius Roberts.  09/29/23   12:25 EDT    Patient or patient representative verbalized consent to the visit recording.  I have personally performed the services described in this document as transcribed by the above individual, and it is both accurate and complete.  [place] [id number] [phone]

## 2023-09-29 ENCOUNTER — OFFICE VISIT (OUTPATIENT)
Dept: FAMILY MEDICINE CLINIC | Facility: CLINIC | Age: 68
End: 2023-09-29
Payer: MEDICARE

## 2023-09-29 VITALS
BODY MASS INDEX: 32.78 KG/M2 | DIASTOLIC BLOOD PRESSURE: 80 MMHG | OXYGEN SATURATION: 99 % | SYSTOLIC BLOOD PRESSURE: 112 MMHG | RESPIRATION RATE: 18 BRPM | HEIGHT: 63 IN | HEART RATE: 89 BPM | WEIGHT: 185 LBS

## 2023-09-29 DIAGNOSIS — J01.41 ACUTE RECURRENT PANSINUSITIS: ICD-10-CM

## 2023-09-29 DIAGNOSIS — J06.9 UPPER RESPIRATORY TRACT INFECTION, UNSPECIFIED TYPE: Primary | ICD-10-CM

## 2023-09-29 LAB
EXPIRATION DATE: NORMAL
EXPIRATION DATE: NORMAL
FLUAV AG UPPER RESP QL IA.RAPID: NOT DETECTED
FLUBV AG UPPER RESP QL IA.RAPID: NOT DETECTED
INTERNAL CONTROL: NORMAL
INTERNAL CONTROL: NORMAL
Lab: NORMAL
Lab: NORMAL
S PYO AG THROAT QL: NEGATIVE
SARS-COV-2 AG UPPER RESP QL IA.RAPID: NOT DETECTED

## 2023-09-29 PROCEDURE — 3074F SYST BP LT 130 MM HG: CPT

## 2023-09-29 PROCEDURE — 3079F DIAST BP 80-89 MM HG: CPT

## 2023-09-29 PROCEDURE — 99213 OFFICE O/P EST LOW 20 MIN: CPT

## 2023-09-29 PROCEDURE — 87428 SARSCOV & INF VIR A&B AG IA: CPT

## 2023-09-29 PROCEDURE — 3044F HG A1C LEVEL LT 7.0%: CPT

## 2023-09-29 PROCEDURE — 87880 STREP A ASSAY W/OPTIC: CPT

## 2023-09-29 RX ORDER — AZITHROMYCIN 250 MG/1
TABLET, FILM COATED ORAL
Qty: 6 TABLET | Refills: 0 | Status: SHIPPED | OUTPATIENT
Start: 2023-09-29

## 2023-10-02 ENCOUNTER — TELEPHONE (OUTPATIENT)
Dept: PHYSICAL THERAPY | Facility: CLINIC | Age: 68
End: 2023-10-02

## 2023-10-04 PROBLEM — K64.1 SECOND DEGREE HEMORRHOIDS: Status: ACTIVE | Noted: 2018-05-31

## 2023-10-10 ENCOUNTER — OFFICE VISIT (OUTPATIENT)
Dept: SURGERY | Facility: CLINIC | Age: 68
End: 2023-10-10
Payer: MEDICARE

## 2023-10-10 VITALS
HEART RATE: 88 BPM | DIASTOLIC BLOOD PRESSURE: 84 MMHG | SYSTOLIC BLOOD PRESSURE: 118 MMHG | BODY MASS INDEX: 34.38 KG/M2 | OXYGEN SATURATION: 97 % | HEIGHT: 63 IN | WEIGHT: 194 LBS

## 2023-10-10 DIAGNOSIS — K64.4 EXTERNAL HEMORRHOID: Primary | ICD-10-CM

## 2023-10-10 PROCEDURE — 1159F MED LIST DOCD IN RCRD: CPT | Performed by: STUDENT IN AN ORGANIZED HEALTH CARE EDUCATION/TRAINING PROGRAM

## 2023-10-10 PROCEDURE — 3074F SYST BP LT 130 MM HG: CPT | Performed by: STUDENT IN AN ORGANIZED HEALTH CARE EDUCATION/TRAINING PROGRAM

## 2023-10-10 PROCEDURE — 3079F DIAST BP 80-89 MM HG: CPT | Performed by: STUDENT IN AN ORGANIZED HEALTH CARE EDUCATION/TRAINING PROGRAM

## 2023-10-10 PROCEDURE — 1160F RVW MEDS BY RX/DR IN RCRD: CPT | Performed by: STUDENT IN AN ORGANIZED HEALTH CARE EDUCATION/TRAINING PROGRAM

## 2023-10-10 PROCEDURE — 99204 OFFICE O/P NEW MOD 45 MIN: CPT | Performed by: STUDENT IN AN ORGANIZED HEALTH CARE EDUCATION/TRAINING PROGRAM

## 2023-10-10 NOTE — PROGRESS NOTES
"Subjective   Johanna Swan is a 67 y.o. female.   Chief Complaint   Patient presents with    Hemorrhoids     New patient referral for hemorrhoids     /84 (BP Location: Right arm, Patient Position: Sitting, Cuff Size: Adult)   Pulse 88   Ht 160 cm (63\")   Wt 88 kg (194 lb)   LMP  (LMP Unknown)   SpO2 97%   BMI 34.37 kg/mý     HISTORY OF PRESENT ILLNESS:  Patient is a 67 year old woman here to discuss her hemorrhoids. Has had them for a long time. Has a had time having bowel movements and occasionally has to force them out. Takes multiple medicines for her constipation. Has to spend a long time wiping. Occasionally will have blood on the paper and having itching. Will rarely have pain. However, these do no impact her too much      Outpatient Encounter Medications as of 10/10/2023   Medication Sig Dispense Refill    albuterol sulfate  (90 Base) MCG/ACT inhaler Inhale 2 puffs Every 6 (Six) Hours As Needed for Wheezing. 18 g 3    Amitiza 24 MCG capsule Take 1 capsule by mouth 2 (Two) Times a Day.  3    Azelastine HCl 137 MCG/SPRAY solution USE 1 TO 2 SPRAY(S) IN EACH NOSTRIL TWICE DAILY AS NEEDED FOR DRAINAGE, SNEEZING OR COUGH      benazepril (LOTENSIN) 10 MG tablet TAKE 1 TABLET DAILY        (INCREASED) 90 tablet 3    dicyclomine (BENTYL) 10 MG capsule Take 1 capsule by mouth 4 (Four) Times a Day Before Meals & at Bedtime. 30 capsule 0    Dulaglutide (Trulicity) 3 MG/0.5ML solution pen-injector Inject 0.5 mL under the skin into the appropriate area as directed 1 (One) Time Per Week. 6 mL 3    DULoxetine (CYMBALTA) 60 MG capsule Take 1 capsule by mouth Daily.      Erenumab-aooe (AIMOVIG) 140 MG/ML prefilled syringe Inject 0.0071 mL under the skin into the appropriate area as directed Every 30 (Thirty) Days.      esomeprazole (nexIUM) 40 MG capsule Take 1 capsule by mouth Daily.      glucose blood (OneTouch Verio) test strip Use as instructed to check glucose once daily 50 each 12    glucose blood " (OneTouch Verio) test strip Use as instructed 100 each 3    HYDROcodone-acetaminophen (NORCO) 5-325 MG per tablet Take 1 tablet by mouth 2 (Two) Times a Day As Needed.      hydrOXYzine (ATARAX) 25 MG tablet Take 1 tablet by mouth 3 (Three) Times a Day As Needed for Itching.      levothyroxine (Synthroid) 88 MCG tablet Take 1 tablet by mouth Daily. 90 tablet 1    lidocaine (XYLOCAINE) 5 % ointment APPLY .5 G OF CREAM TO AFFECTED AREA NOT EXCEEDING MORE THEN 13 TUBE PER DAY.      Lidocaine-Tetracaine 7-7 % cream APPLY TO AFFECTED AREA TWICE DAILY AS NEEDED      Lyrica 150 MG capsule Take 1 capsule by mouth 2 (Two) Times a Day.      metoprolol succinate XL (TOPROL-XL) 25 MG 24 hr tablet Take 1 tablet by mouth Daily. 90 tablet 3    mometasone (Elocon) 0.1 % cream Apply to rash twice daily as needed 50 g 1    mometasone (NASONEX) 50 MCG/ACT nasal spray 2 sprays into the nostril(s) as directed by provider Daily. 3 each 3    ondansetron (Zofran) 4 MG tablet 1-2 tablets every 6-8 hours if needed. 30 tablet 1    OneTouch Verio test strip USE AND DISCARD 1 TEST     STRIP DAILY AS DIRECTED 100 each 3    rizatriptan (MAXALT) 10 MG tablet Take 1 tablet by mouth 1 (One) Time As Needed for Migraine (May repeat in 2 hours if needed for persistent headache). 9 tablet 5    rosuvastatin (CRESTOR) 5 MG tablet Take 1 tablet by mouth Daily. 90 tablet 1    tamsulosin (FLOMAX) 0.4 MG capsule 24 hr capsule Take 1 capsule by mouth Daily.  11    tiZANidine (ZANAFLEX) 4 MG tablet Take 1 tablet by mouth Every Night.      azithromycin (Zithromax Z-Carrillo) 250 MG tablet Take 2 tablets by mouth on day 1, then 1 tablet daily on days 2-5 (Patient not taking: Reported on 10/10/2023) 6 tablet 0    guaifenesin-dextromethorphan (MUCINEX DM)  MG tablet sustained-release 12 hour tablet Take 1 tablet by mouth 2 (Two) Times a Day As Needed (cough and congestion). (Patient not taking: Reported on 10/10/2023) 20 tablet 1     No facility-administered  encounter medications on file as of 10/10/2023.     Past Medical History:   Diagnosis Date    Diabetes mellitus     Dysphagia     GERD (gastroesophageal reflux disease)     H/O mammogram 07/18/2018    BIRAD 2 benign    Hiatal hernia     HL (hearing loss) 2020    Hyperlipidemia     Hypertension     Hypothyroid     IBS (irritable bowel syndrome)     Knee pain 10/2015    knee injections---bilateral    Low back pain     Migraine     Obesity     Scoliosis     Sleep apnea with use of continuous positive airway pressure (CPAP)     at night    Spinal stenosis     Spondylosis     TIA (transient ischemic attack)     TMJ (dislocation of temporomandibular joint)      Past Surgical History:   Procedure Laterality Date    BLADDER SURGERY  12/2005    bladder sling    COLONOSCOPY  05/31/2018    Dr. Kent    HYSTERECTOMY  12/2005    KNEE ARTHROSCOPY Left 02/27/2017    Dr. Boone    KNEE ARTHROSCOPY Right 01/29/2018    Dr. Boone    TUBAL ABDOMINAL LIGATION  12/2005     Family History   Problem Relation Age of Onset    Asthma Mother         Nasal polyps    Migraines Mother     Kidney disease Mother     Hyperlipidemia Father     Thyroid cancer Father     Heart failure Father     Cancer Father         Thyroid    Heart disease Father     Thyroid disease Father     Thyroid disease Sister     Autoimmune disease Sister     Glaucoma Sister     Drug abuse Sister     Hyperlipidemia Sister     Macular degeneration Sister     Heart failure Sister     Early death Sister     Stroke Sister     Vision loss Sister     Other Sister         Homocystinuria    Arthritis Sister         Rheumatoid    Heart disease Sister     Arthritis Sister         Rheumatoid    Drug abuse Sister     Diabetes Sister     Heart disease Sister     Early death Sister     Drug abuse Brother     Hyperlipidemia Brother     Kidney disease Brother     Heart disease Brother     Thyroid disease Brother     Hyperlipidemia Brother     Anxiety disorder Daughter     Diabetes Other      Heart disease Other     Hypertension Other     Hyperlipidemia Other     Cancer Other     Drug abuse Brother     Early death Brother     Breast cancer Neg Hx     Ovarian cancer Neg Hx        Social History     Tobacco Use    Smoking status: Former     Packs/day: 1.50     Years: 20.00     Additional pack years: 0.00     Total pack years: 30.00     Types: Cigarettes     Start date: 1970     Quit date: 1990     Years since quittin.7     Passive exposure: Past    Smokeless tobacco: Never   Vaping Use    Vaping Use: Never used   Substance Use Topics    Alcohol use: No    Drug use: No     Patient has no known allergies.    Review of Systems   Gastrointestinal:  Positive for blood in stool and rectal pain.       Objective     Physical Exam  Cardiovascular:      Rate and Rhythm: Normal rate.   Pulmonary:      Effort: Pulmonary effort is normal.   Abdominal:      General: Abdomen is flat.      Comments: Anal exam: Non-inflamed small external hemorrhoids   Neurological:      Mental Status: She is alert.           Assessment & Plan   Diagnoses and all orders for this visit:    1. External hemorrhoid (Primary)    -Her symptoms seemed to be most connected with her constipation and less with her hemorrhoids  -Seeing as she is minimally symptomatic, I recommend increased fiber intake, adequate hydration and optimization of her constipation  -Information provided about the pathophysiology and treatment of hemorrhoids  -Discussed she is free to make another appointment if her symptoms worsen and she agrees and understands.     Angel Zayas MD  10/10/2023  2:32 PM EDT

## 2023-10-16 ENCOUNTER — TREATMENT (OUTPATIENT)
Dept: PHYSICAL THERAPY | Facility: CLINIC | Age: 68
End: 2023-10-16
Payer: MEDICARE

## 2023-10-16 DIAGNOSIS — M17.11 PRIMARY OSTEOARTHRITIS OF RIGHT KNEE: Primary | ICD-10-CM

## 2023-10-16 DIAGNOSIS — R26.89 ANTALGIC GAIT: ICD-10-CM

## 2023-10-16 DIAGNOSIS — M17.12 PRIMARY OSTEOARTHRITIS OF LEFT KNEE: ICD-10-CM

## 2023-10-16 PROCEDURE — 97110 THERAPEUTIC EXERCISES: CPT | Performed by: PHYSICAL THERAPIST

## 2023-10-16 PROCEDURE — 97140 MANUAL THERAPY 1/> REGIONS: CPT | Performed by: PHYSICAL THERAPIST

## 2023-10-16 PROCEDURE — 97161 PT EVAL LOW COMPLEX 20 MIN: CPT | Performed by: PHYSICAL THERAPIST

## 2023-10-16 NOTE — PROGRESS NOTES
Physical Therapy Initial Evaluation and Plan of Care  [address] [place] [address], [address], [facility] [place]  [address]    Patient: Johanna Swan   : 1955  Diagnosis/ICD-10 Code:  Primary osteoarthritis of right knee [M17.11]  Referring practitioner: Eugenio Duffy PA-C  Date of Initial Visit: 10/16/2023  Today's Date: 10/16/2023  Patient seen for 1 sessions           Subjective Questionnaire: Oxford knee score = 69% impairment      Subjective Patient is a 67 y.o. WF who presents with chronic B knee pain.  She is disabled due to back pain as well.  Personal goals are to play with her grandbabies and walk on the beach.  She is going to the beach in 2 weeks.  X-ray shows advanced tricompartmental OA in both knees.  Pain ranges from 0-5/10 and increases with walking, standing.  Pain decreases with sitting and laying down.        Objective Oxford knee score indicates 69% impairment with limitations in walking, standing, kneel, sleep. Ambulates with antalgia, B knee varus deformity noted.  Unable to SLS on either leg.  Repeated sit to stand = 5 reps in 30 sec using B arms and abnormal movement.  AROM knee flexion = 125 deg R, 115 deg L; extension 5-10 deg lag B.  Moderately tight ham B.  MMT:  4/5 B knee flexion, hip extension.      Assessment/Plan  Patient independent with HEP in 2 weeks  Tolerate 10 min Nustep/bike in 2 weeks  Able to tolerate walking 10 min in 2 weeks  Improve function as evidenced by Oxford knee score of 20% or less in 12 weeks (69% impairment initially)  Able to return to work in 12 weeks  Perform 10 reps of repeated sit to stand without arms in 30 seconds in 12 weeks    Timed:         Manual Therapy:    15     mins  33818;     Therapeutic Exercise:    15     mins  76076;     Neuromuscular Torie:        mins  15483;    Therapeutic Activity:          mins  79514;     Gait Training:           mins  21629;     Ultrasound:          mins  19223;    Self-care  ____ mins [id number]    Un-Timed:  Electrical  Stimulation:         mins  85484 ( );  Dry Needling          mins self-pay 40249  Traction          mins 28144  Low Eval     30     Mins  79466  Mod Eval          Mins  18518  Canal repositioning _____ mins  91219        Timed Treatment:   30   mins   Total Treatment:     60   mins    PT SIGNATURE: Robin A Sprigler, PT   [place] license # [id number]  Electronically signed by Robin A Sprigler, PT, 10/16/23, 2:36 PM EDT    Initial Certification  Certification Period: 10/16/2023 through 1/13/2024  I certify that the therapy services are furnished while this patient is under my care.  The services outlined above are required by this patient, and will be reviewed every 90 days.     PHYSICIAN: Eugenio Duffy PA-C ______________________________________________________________________________________________     DATE: _________________________________________________________________________________________________________________________________    Please sign and return via fax to [phone]. Thank you, [facility].

## 2023-10-19 ENCOUNTER — TELEPHONE (OUTPATIENT)
Dept: PHYSICAL THERAPY | Facility: OTHER | Age: 68
End: 2023-10-19
Payer: MEDICARE

## 2023-10-19 NOTE — TELEPHONE ENCOUNTER
"  Caller: Johanna Swan \"Dennis\"    Relationship: Self    What was the call regarding: PATIENT CANCELLED APPT TODAY BECAUSE THEY CANT MAKE IT       "

## 2023-11-15 DIAGNOSIS — E03.9 ACQUIRED HYPOTHYROIDISM: ICD-10-CM

## 2023-11-15 RX ORDER — LEVOTHYROXINE SODIUM 88 MCG
88 TABLET ORAL DAILY
Qty: 90 TABLET | Refills: 1 | Status: SHIPPED | OUTPATIENT
Start: 2023-11-15

## 2023-11-29 ENCOUNTER — FLU SHOT (OUTPATIENT)
Dept: FAMILY MEDICINE CLINIC | Facility: CLINIC | Age: 68
End: 2023-11-29
Payer: MEDICARE

## 2023-11-29 DIAGNOSIS — Z23 NEED FOR INFLUENZA VACCINATION: Primary | ICD-10-CM

## 2023-11-29 PROCEDURE — 90662 IIV NO PRSV INCREASED AG IM: CPT | Performed by: FAMILY MEDICINE

## 2023-11-29 PROCEDURE — G0008 ADMIN INFLUENZA VIRUS VAC: HCPCS | Performed by: FAMILY MEDICINE

## 2023-12-11 ENCOUNTER — TELEPHONE (OUTPATIENT)
Dept: ORTHOPEDIC SURGERY | Facility: CLINIC | Age: 68
End: 2023-12-11

## 2023-12-11 NOTE — TELEPHONE ENCOUNTER
"    Hub staff attempted to follow warm transfer process and was unsuccessful     Caller: Johanna Swan \"Dennis\"    Relationship to patient: Self    Best call back number: [phone]    Patient is needing: PATIENT RETURNING CALL TO R/S EUFLEXXA INJECTIONS        "

## 2023-12-30 DIAGNOSIS — I10 ESSENTIAL HYPERTENSION: ICD-10-CM

## 2024-01-02 ENCOUNTER — OFFICE VISIT (OUTPATIENT)
Dept: ORTHOPEDIC SURGERY | Facility: CLINIC | Age: 69
End: 2024-01-02
Payer: MEDICARE

## 2024-01-02 VITALS — BODY MASS INDEX: 32.25 KG/M2 | HEART RATE: 67 BPM | HEIGHT: 63 IN | WEIGHT: 182 LBS

## 2024-01-02 DIAGNOSIS — M17.11 PRIMARY OSTEOARTHRITIS OF RIGHT KNEE: Primary | ICD-10-CM

## 2024-01-02 DIAGNOSIS — M17.12 PRIMARY OSTEOARTHRITIS OF LEFT KNEE: ICD-10-CM

## 2024-01-02 PROCEDURE — 20610 DRAIN/INJ JOINT/BURSA W/O US: CPT | Performed by: PHYSICIAN ASSISTANT

## 2024-01-02 RX ORDER — EPINEPHRINE 0.3 MG/.3ML
INJECTION SUBCUTANEOUS
COMMUNITY
Start: 2023-11-03

## 2024-01-02 RX ORDER — HYALURONATE SODIUM 10 MG/ML
20 SYRINGE (ML) INTRAARTICULAR
Status: COMPLETED | OUTPATIENT
Start: 2024-01-02 | End: 2024-01-02

## 2024-01-02 RX ORDER — ROSUVASTATIN CALCIUM 5 MG/1
5 TABLET, COATED ORAL DAILY
Qty: 90 TABLET | Refills: 1 | OUTPATIENT
Start: 2024-01-02

## 2024-01-02 RX ADMIN — Medication 20 MG: at 15:17

## 2024-01-02 RX ADMIN — Medication 20 MG: at 15:18

## 2024-01-02 NOTE — PROGRESS NOTES
"   Patient ID: Johanna Sawn is a 68 y.o. female presents with her , Angel, and grandson, Jayant 5y/o, for bilateral knee Euflexxa HA injection.       Objective:  Pulse 67   Ht 160 cm (63\")   Wt 82.6 kg (182 lb)   LMP  (LMP Unknown)   BMI 32.24 kg/m²     Physical Examination:    Bilateral knees demonstrate intact skin, no apparent effusion, no warmth.  Range of motion 0-120    Imaging:   No new imaging.     Assessment:    Diagnoses and all orders for this visit:    1. Primary osteoarthritis of right knee (Primary)  -     Large Joint Arthrocentesis    2. Primary osteoarthritis of left knee  -     Large Joint Arthrocentesis     Plan: Restrictions discussed. May perform activity as tolerated by Thursday afternoon. Follow up for next injection, #2, series. All questions answered.     Large Joint Arthrocentesis: R knee  Date/Time: 1/2/2024 3:17 PM  Consent given by: patient  Site marked: site marked  Timeout: Immediately prior to procedure a time out was called to verify the correct patient, procedure, equipment, support staff and site/side marked as required   Supporting Documentation  Indications: pain   Procedure Details  Location: knee - R knee  Preparation: Patient was prepped and draped in the usual sterile fashion  Needle size: 22 G  Approach: anterolateral  Medications administered: 20 mg Sodium Hyaluronate (Viscosup) 20 MG/2ML  Aspirate amount: 0 mL  Patient tolerance: patient tolerated the procedure well with no immediate complications      Large Joint Arthrocentesis: L knee  Date/Time: 1/2/2024 3:18 PM  Consent given by: patient  Site marked: site marked  Timeout: Immediately prior to procedure a time out was called to verify the correct patient, procedure, equipment, support staff and site/side marked as required   Supporting Documentation  Indications: pain   Procedure Details  Location: knee - L knee  Preparation: Patient was prepped and draped in the usual sterile fashion  Needle size: 22 " G  Approach: anterolateral  Medications administered: 20 mg Sodium Hyaluronate (Viscosup) 20 MG/2ML  Aspirate amount: 0 mL  Patient tolerance: patient tolerated the procedure well with no immediate complications        Disclaimer: Part of this note may be an electronic transcription/translation of spoken language to printed text using the Dragon Dictation System

## 2024-01-02 NOTE — TELEPHONE ENCOUNTER
I cannot get this prescription to send.  States I need to change it to the requested pharmacy but cannot find this pharmacy on the pharmacy list.  [facility] - [place], [place] - [address] - [phone]  - [phone] FX

## 2024-01-03 DIAGNOSIS — I10 ESSENTIAL HYPERTENSION: ICD-10-CM

## 2024-01-03 DIAGNOSIS — E78.2 MIXED HYPERLIPIDEMIA: Primary | ICD-10-CM

## 2024-01-03 RX ORDER — BENAZEPRIL HYDROCHLORIDE 10 MG/1
TABLET ORAL
Qty: 90 TABLET | Refills: 3 | OUTPATIENT
Start: 2024-01-03

## 2024-01-03 RX ORDER — ROSUVASTATIN CALCIUM 5 MG/1
5 TABLET, COATED ORAL DAILY
Qty: 90 TABLET | Refills: 1 | Status: SHIPPED | OUTPATIENT
Start: 2024-01-03

## 2024-01-03 RX ORDER — BENAZEPRIL HYDROCHLORIDE 10 MG/1
10 TABLET ORAL DAILY
Qty: 90 TABLET | Refills: 3 | Status: SHIPPED | OUTPATIENT
Start: 2024-01-03

## 2024-01-03 NOTE — PROGRESS NOTES
Chief Complaint  Diabetes and Knee Pain    History of Present Illness  Johanna Swan presents today for diabetes and knee pain follow up    Diabetes  Patient does check blood sugar at home occasionally.  Per patient fasting blood sugars range between 99 to 110. Have had  4 episodes of dizziness and sweats, 2 low readings recorded at 49 and 65. Do not want a CGM.   Associated symptoms include None  Per patient current diet is Variety of foods.  Patient does not avoid concentrated sweets.  Patient does see podiatry.  Patient see's Dr. Ledezma for diabetic eye exam and last eye exam was 10/2023.  Patient reports they are taking medications as prescribed and they are not having side effects.  Side effects include none    Diabetic Foot Exam Performed     Knee Pain: Patient presents with knee pain involving the  bilateral knee. Onset of the symptoms was several years ago. Inciting event: injured while falling several times prior . Current symptoms include giving out and locking. Pain is aggravated by standing and walking.  Patient has had no prior knee problems. Evaluation to date:  Dr. Duffy on 01/02/2024 for injections, patient goes back today . Treatment to date:  hydrocodone acetaminophen 5-325 mg . Do not take regularly will take at HS when pain is worse.  Do have a pain cream from pain management that includes gabapentin, ibuprofen, lidocaine, baclofen and at least 1 other ingredients she cannot remember today. Getting knee injections series of 3 hyaluronic acid injections with Dr Duffy    Patient Care Team:  Spring Silverio MD as PCP - General (Family Medicine)  Spring Silverio MD as PCP - Family Medicine   Current Outpatient Medications on File Prior to Visit   Medication Sig    albuterol sulfate  (90 Base) MCG/ACT inhaler Inhale 2 puffs Every 6 (Six) Hours As Needed for Wheezing.    Amitiza 24 MCG capsule Take 1 capsule by mouth 2 (Two) Times a Day.    Azelastine HCl 137  MCG/SPRAY solution USE 1 TO 2 SPRAY(S) IN EACH NOSTRIL TWICE DAILY AS NEEDED FOR DRAINAGE, SNEEZING OR COUGH    benazepril (LOTENSIN) 10 MG tablet Take 1 tablet by mouth Daily.    benzonatate (TESSALON) 200 MG capsule Take 1 capsule by mouth 3 (Three) Times a Day As Needed for Cough.    dicyclomine (BENTYL) 10 MG capsule Take 1 capsule by mouth 4 (Four) Times a Day Before Meals & at Bedtime.    DULoxetine (CYMBALTA) 60 MG capsule Take 1 capsule by mouth Daily.    EPINEPHrine (EPIPEN) 0.3 MG/0.3ML solution auto-injector injection     Erenumab-aooe (AIMOVIG) 140 MG/ML prefilled syringe Inject 0.0071 mL under the skin into the appropriate area as directed Every 30 (Thirty) Days.    esomeprazole (nexIUM) 40 MG capsule Take 1 capsule by mouth Daily.    glucose blood (OneTouch Verio) test strip Use as instructed    guaiFENesin-codeine (GUAIFENESIN AC) 100-10 MG/5ML liquid Take 5 mL by mouth 3 (Three) Times a Day As Needed for Cough.    HYDROcodone-acetaminophen (NORCO) 5-325 MG per tablet Take 1 tablet by mouth 2 (Two) Times a Day As Needed.    hydrOXYzine (ATARAX) 25 MG tablet Take 1 tablet by mouth 3 (Three) Times a Day As Needed for Itching.    lidocaine (XYLOCAINE) 5 % ointment APPLY .5 G OF CREAM TO AFFECTED AREA NOT EXCEEDING MORE THEN 13 TUBE PER DAY.    Lidocaine-Tetracaine 7-7 % cream APPLY TO AFFECTED AREA TWICE DAILY AS NEEDED    Lyrica 150 MG capsule Take 1 capsule by mouth 2 (Two) Times a Day.    metoprolol succinate XL (TOPROL-XL) 25 MG 24 hr tablet Take 1 tablet by mouth Daily.    mometasone (NASONEX) 50 MCG/ACT nasal spray 2 sprays into the nostril(s) as directed by provider Daily.    NON FORMULARY Allergy shots    ondansetron (Zofran) 4 MG tablet 1-2 tablets every 6-8 hours if needed.    OneTouch Verio test strip USE AND DISCARD 1 TEST     STRIP DAILY AS DIRECTED    rizatriptan (MAXALT) 10 MG tablet Take 1 tablet by mouth 1 (One) Time As Needed for Migraine (May repeat in 2 hours if needed for persistent  "headache).    Synthroid 88 MCG tablet TAKE 1 TABLET DAILY    tamsulosin (FLOMAX) 0.4 MG capsule 24 hr capsule Take 1 capsule by mouth Daily.    tiZANidine (ZANAFLEX) 4 MG tablet Take 1 tablet by mouth Every Night.    [DISCONTINUED] Dulaglutide (Trulicity) 3 MG/0.5ML solution pen-injector Inject 0.5 mL under the skin into the appropriate area as directed 1 (One) Time Per Week.    [DISCONTINUED] glucose blood (OneTouch Verio) test strip Use as instructed to check glucose once daily    methylPREDNISolone (MEDROL) 4 MG dose pack Take as directed on package instructions.    mometasone (Elocon) 0.1 % cream Apply to rash twice daily as needed    rosuvastatin (CRESTOR) 5 MG tablet Take 1 tablet by mouth Daily.     No current facility-administered medications on file prior to visit.       Objective   Vital Signs:   /72 (BP Location: Right arm, Patient Position: Sitting, Cuff Size: Large Adult)   Pulse (!) 126   Temp 98.2 °F (36.8 °C) (Temporal)   Resp 18   Ht 160 cm (63\")   Wt 87.5 kg (193 lb)   SpO2 95%   BMI 34.19 kg/m²    BP Readings from Last 3 Encounters:   01/09/24 112/72   10/28/23 106/84   10/23/23 106/76     Wt Readings from Last 3 Encounters:   01/09/24 87.5 kg (193 lb)   01/09/24 87.5 kg (193 lb)   01/02/24 82.6 kg (182 lb)         Physical Exam  Vitals and nursing note reviewed.   Constitutional:       General: She is not in acute distress.     Appearance: She is well-developed. She is obese. She is not ill-appearing.   HENT:      Head: Normocephalic and atraumatic.   Cardiovascular:      Rate and Rhythm: Normal rate and regular rhythm.      Heart sounds: No murmur heard.  Pulmonary:      Effort: Pulmonary effort is normal.      Breath sounds: Normal breath sounds. No wheezing.   Musculoskeletal:         General: Normal range of motion.      Cervical back: Normal range of motion and neck supple.      Comments: Very antalgic gait, at least for the first several yards she does not fully extend her legs " at her knees.  There is arthritic changes including extension limited to about 170 degrees bilaterally.  Positive crepitus, no joint laxity.   Feet:      Right foot:      Protective Sensation: 10 sites tested.  10 sites sensed.      Skin integrity: Skin integrity normal.      Toenail Condition: Right toenails are normal.      Left foot:      Protective Sensation: 10 sites tested.  10 sites sensed.      Skin integrity: Skin integrity normal.      Toenail Condition: Left toenails are normal.   Lymphadenopathy:      Cervical: No cervical adenopathy.   Skin:     General: Skin is warm and dry.      Findings: No rash.   Neurological:      Mental Status: She is alert and oriented to person, place, and time.   Psychiatric:         Mood and Affect: Mood normal.         Behavior: Behavior normal.         Thought Content: Thought content normal.            No visits with results within 1 Day(s) from this visit.   Latest known visit with results is:   Admission on 10/23/2023, Discharged on 10/23/2023   Component Date Value Ref Range Status    SARS Antigen 10/23/2023 Detected (A)  Not Detected, Presumptive Negative Final    Influenza A Antigen MARIAN 10/23/2023 Not Detected  Not Detected Final    Influenza B Antigen MARIAN 10/23/2023 Not Detected  Not Detected Final    Internal Control 10/23/2023 Passed  Passed Final    Lot Number 10/23/2023 [id number]   Final    Expiration Date 10/23/2023 102,724   Final     A1C Last 3 Results          4/21/2023    11:12 7/20/2023    10:17 1/8/2024    10:21   HGBA1C Last 3 Results   Hemoglobin A1C 6.7  6.3  6.6      Lab Results   Component Value Date    CHLPL 182 07/20/2023    TRIG 184 (H) 07/20/2023    HDL 51 07/20/2023    LDL 99 07/20/2023     Lab Results   Component Value Date    TSH 1.430 07/20/2023     Lab Results   Component Value Date    GLUCOSE 105 (H) 01/08/2024    BUN 16 01/08/2024    CREATININE 0.78 01/08/2024    EGFRIFNONA 70 02/21/2022    EGFRIFAFRI 81 02/21/2022    BCR 21 01/08/2024    K  4.6 01/08/2024    CO2 22 01/08/2024    CALCIUM 9.6 01/08/2024    PROTENTOTREF 6.7 01/08/2024    ALBUMIN 4.1 01/08/2024    LABIL2 1.6 01/08/2024    AST 13 01/08/2024    ALT 12 01/08/2024     Lab Results   Component Value Date    WBC 5.0 04/21/2023    HGB 12.3 04/21/2023    HCT 37.8 04/21/2023    MCV 87 04/21/2023     04/21/2023                      Assessment and Plan    Diagnoses and all orders for this visit:    1. Type 2 diabetes mellitus without complication, without long-term current use of insulin (Primary)  -     glucose blood (OneTouch Verio) test strip; Use as instructed to check glucose once daily  Dispense: 50 each; Refill: 12  -     Tirzepatide (Mounjaro) 2.5 MG/0.5ML solution pen-injector pen; Inject 0.5 mL under the skin into the appropriate area as directed 1 (One) Time Per Week.  Dispense: 2 mL; Refill: 0    2. Chronic pain of both knees    3. Class 1 obesity due to excess calories with serious comorbidity and body mass index (BMI) of 34.0 to 34.9 in adult    4. Mixed hyperlipidemia  -     Lipid Panel    5. Essential hypertension    Diabetes, excellent control however she is having a few hypoglycemic episodes.  She is gaining weight but attributes this to over indulging through the holidays including eating more leny bars.  Appears Mounjuro is also covered by her insurance we will try to make this change to see if Mounjuro is less likely to cause hypoglycemia and more effective at appetite suppression and weight loss.  Did remind her to follow a low concentrated sweets/reduced calorie diet.    Hyperlipidemia.  Patient was out of rosuvastatin due to issue with pharmacy and/or insurance, and in anticipation of labs purposefully did not restart when she finally received it from the pharmacy 2 weeks ago.  She would like to check cholesterol today without medication.  Advised regardless of level would recommend she restart Crestor for cardiovascular protection, she does agree.    Hypertension at  goal continue current medications    Knee pain, patient is currently receiving Hyalgan injections Ortho/sports medicine.    Medications Discontinued During This Encounter   Medication Reason    Dulaglutide (Trulicity) 3 MG/0.5ML solution pen-injector Alternate therapy    glucose blood (OneTouch Verio) test strip Reorder         Follow Up     Return in about 3 months (around 4/9/2024) for diabetes, with Labs.    Patient was given instructions and counseling regarding her condition or for health maintenance advice. Please see specific information pulled into the AVS if appropriate.

## 2024-01-09 ENCOUNTER — OFFICE VISIT (OUTPATIENT)
Dept: FAMILY MEDICINE CLINIC | Facility: CLINIC | Age: 69
End: 2024-01-09
Payer: MEDICARE

## 2024-01-09 ENCOUNTER — OFFICE VISIT (OUTPATIENT)
Dept: ORTHOPEDIC SURGERY | Facility: CLINIC | Age: 69
End: 2024-01-09
Payer: MEDICARE

## 2024-01-09 VITALS — BODY MASS INDEX: 34.2 KG/M2 | HEIGHT: 63 IN | WEIGHT: 193 LBS | HEART RATE: 82 BPM | OXYGEN SATURATION: 95 %

## 2024-01-09 VITALS
HEIGHT: 63 IN | RESPIRATION RATE: 18 BRPM | DIASTOLIC BLOOD PRESSURE: 72 MMHG | OXYGEN SATURATION: 95 % | SYSTOLIC BLOOD PRESSURE: 112 MMHG | BODY MASS INDEX: 34.2 KG/M2 | HEART RATE: 126 BPM | TEMPERATURE: 98.2 F | WEIGHT: 193 LBS

## 2024-01-09 DIAGNOSIS — E78.2 MIXED HYPERLIPIDEMIA: ICD-10-CM

## 2024-01-09 DIAGNOSIS — G89.29 CHRONIC PAIN OF BOTH KNEES: ICD-10-CM

## 2024-01-09 DIAGNOSIS — M25.562 CHRONIC PAIN OF BOTH KNEES: ICD-10-CM

## 2024-01-09 DIAGNOSIS — E66.09 CLASS 1 OBESITY DUE TO EXCESS CALORIES WITH SERIOUS COMORBIDITY AND BODY MASS INDEX (BMI) OF 34.0 TO 34.9 IN ADULT: ICD-10-CM

## 2024-01-09 DIAGNOSIS — M17.11 PRIMARY OSTEOARTHRITIS OF RIGHT KNEE: Primary | ICD-10-CM

## 2024-01-09 DIAGNOSIS — M17.12 PRIMARY OSTEOARTHRITIS OF LEFT KNEE: ICD-10-CM

## 2024-01-09 DIAGNOSIS — M25.561 CHRONIC PAIN OF BOTH KNEES: ICD-10-CM

## 2024-01-09 DIAGNOSIS — I10 ESSENTIAL HYPERTENSION: ICD-10-CM

## 2024-01-09 DIAGNOSIS — E11.9 TYPE 2 DIABETES MELLITUS WITHOUT COMPLICATION, WITHOUT LONG-TERM CURRENT USE OF INSULIN: Primary | ICD-10-CM

## 2024-01-09 LAB
ALBUMIN SERPL-MCNC: 4.1 G/DL (ref 3.9–4.9)
ALBUMIN/GLOB SERPL: 1.6 {RATIO} (ref 1.2–2.2)
ALP SERPL-CCNC: 93 IU/L (ref 44–121)
ALT SERPL-CCNC: 12 IU/L (ref 0–32)
AST SERPL-CCNC: 13 IU/L (ref 0–40)
BILIRUB SERPL-MCNC: 0.6 MG/DL (ref 0–1.2)
BUN SERPL-MCNC: 16 MG/DL (ref 8–27)
BUN/CREAT SERPL: 21 (ref 12–28)
CALCIUM SERPL-MCNC: 9.6 MG/DL (ref 8.7–10.3)
CHLORIDE SERPL-SCNC: 103 MMOL/L (ref 96–106)
CO2 SERPL-SCNC: 22 MMOL/L (ref 20–29)
CREAT SERPL-MCNC: 0.78 MG/DL (ref 0.57–1)
EGFRCR SERPLBLD CKD-EPI 2021: 83 ML/MIN/1.73
GLOBULIN SER CALC-MCNC: 2.6 G/DL (ref 1.5–4.5)
GLUCOSE SERPL-MCNC: 105 MG/DL (ref 70–99)
HBA1C MFR BLD: 6.6 % (ref 4.8–5.6)
POTASSIUM SERPL-SCNC: 4.6 MMOL/L (ref 3.5–5.2)
PROT SERPL-MCNC: 6.7 G/DL (ref 6–8.5)
SODIUM SERPL-SCNC: 140 MMOL/L (ref 134–144)

## 2024-01-09 PROCEDURE — 1160F RVW MEDS BY RX/DR IN RCRD: CPT | Performed by: PHYSICIAN ASSISTANT

## 2024-01-09 PROCEDURE — 20610 DRAIN/INJ JOINT/BURSA W/O US: CPT | Performed by: PHYSICIAN ASSISTANT

## 2024-01-09 PROCEDURE — 1160F RVW MEDS BY RX/DR IN RCRD: CPT | Performed by: FAMILY MEDICINE

## 2024-01-09 PROCEDURE — 3074F SYST BP LT 130 MM HG: CPT | Performed by: FAMILY MEDICINE

## 2024-01-09 PROCEDURE — 99214 OFFICE O/P EST MOD 30 MIN: CPT | Performed by: FAMILY MEDICINE

## 2024-01-09 PROCEDURE — 3078F DIAST BP <80 MM HG: CPT | Performed by: FAMILY MEDICINE

## 2024-01-09 PROCEDURE — 1159F MED LIST DOCD IN RCRD: CPT | Performed by: FAMILY MEDICINE

## 2024-01-09 PROCEDURE — 3044F HG A1C LEVEL LT 7.0%: CPT | Performed by: FAMILY MEDICINE

## 2024-01-09 PROCEDURE — 1159F MED LIST DOCD IN RCRD: CPT | Performed by: PHYSICIAN ASSISTANT

## 2024-01-09 RX ORDER — BLOOD SUGAR DIAGNOSTIC
STRIP MISCELLANEOUS
Qty: 50 EACH | Refills: 12 | Status: SHIPPED | OUTPATIENT
Start: 2024-01-09

## 2024-01-09 RX ORDER — HYALURONATE SODIUM 10 MG/ML
20 SYRINGE (ML) INTRAARTICULAR
Status: COMPLETED | OUTPATIENT
Start: 2024-01-09 | End: 2024-01-09

## 2024-01-09 RX ADMIN — Medication 20 MG: at 15:23

## 2024-01-09 RX ADMIN — Medication 20 MG: at 15:11

## 2024-01-09 NOTE — PROGRESS NOTES
"   Patient ID: Johanna Swan is a 68 y.o. female presents for #2 of 3 part series  Euflexxa HA injection to bilateral knees.  She reports decreased pain in  bilateral knees compared to prior to the last injection.        Objective:  Pulse 82   Ht 160 cm (63\")   Wt 87.5 kg (193 lb)   LMP  (LMP Unknown)   SpO2 95%   BMI 34.19 kg/m²     Physical Examination:     Right knee:  Intact skin.  Trace effusion no warmth  Extension 0, flexion 115  Laxity    Left knee:  Intact skin.  No effusion.  No warmth  Extension 1, flexion 120  Laxity    Imaging:   None new    Assessment:    Diagnoses and all orders for this visit:    1. Primary osteoarthritis of right knee (Primary)    2. Primary osteoarthritis of left knee    Other orders  -     Large Joint Arthrocentesis  -     Large Joint Arthrocentesis    Plan: Euflexxa HA injected to bilateral knees today.  Restrictions discussed.  May resume activity as tolerated Thursday afternoon.  Follow-up in 1 week for third and final Euflexxa HA injections of the series.  All questions answered    Large Joint Arthrocentesis: L knee  Date/Time: 1/9/2024 3:11 PM  Consent given by: patient  Site marked: site marked  Timeout: Immediately prior to procedure a time out was called to verify the correct patient, procedure, equipment, support staff and site/side marked as required   Supporting Documentation  Indications: pain   Procedure Details  Location: knee - L knee  Preparation: Patient was prepped and draped in the usual sterile fashion  Needle size: 22 G  Approach: anterolateral  Medications administered: 20 mg Sodium Hyaluronate (Viscosup) 20 MG/2ML  Patient tolerance: patient tolerated the procedure well with no immediate complications      Large Joint Arthrocentesis: R knee  Date/Time: 1/9/2024 3:23 PM  Consent given by: patient  Site marked: site marked  Timeout: Immediately prior to procedure a time out was called to verify the correct patient, procedure, equipment, support staff " and site/side marked as required   Supporting Documentation  Indications: pain   Procedure Details  Location: knee - R knee  Preparation: Patient was prepped and draped in the usual sterile fashion  Needle size: 22 G  Approach: anterolateral  Medications administered: 20 mg Sodium Hyaluronate (Viscosup) 20 MG/2ML  Patient tolerance: patient tolerated the procedure well with no immediate complications      Disclaimer: Part of this note may be an electronic transcription/translation of spoken language to printed text using the Dragon Dictation System

## 2024-01-10 LAB
CHOLEST SERPL-MCNC: 265 MG/DL (ref 100–199)
HDLC SERPL-MCNC: 48 MG/DL
LDLC SERPL CALC-MCNC: 179 MG/DL (ref 0–99)
SPECIMEN STATUS: NORMAL
TRIGL SERPL-MCNC: 205 MG/DL (ref 0–149)
VLDLC SERPL CALC-MCNC: 38 MG/DL (ref 5–40)

## 2024-01-16 ENCOUNTER — OFFICE VISIT (OUTPATIENT)
Dept: ORTHOPEDIC SURGERY | Facility: CLINIC | Age: 69
End: 2024-01-16
Payer: MEDICARE

## 2024-01-16 VITALS — HEIGHT: 63 IN | WEIGHT: 193 LBS | OXYGEN SATURATION: 100 % | BODY MASS INDEX: 34.2 KG/M2 | HEART RATE: 94 BPM

## 2024-01-16 DIAGNOSIS — E78.2 MIXED HYPERLIPIDEMIA: Primary | ICD-10-CM

## 2024-01-16 DIAGNOSIS — M17.11 PRIMARY OSTEOARTHRITIS OF RIGHT KNEE: Primary | ICD-10-CM

## 2024-01-16 DIAGNOSIS — E03.9 ACQUIRED HYPOTHYROIDISM: ICD-10-CM

## 2024-01-16 DIAGNOSIS — M17.12 PRIMARY OSTEOARTHRITIS OF LEFT KNEE: ICD-10-CM

## 2024-01-16 DIAGNOSIS — E11.9 TYPE 2 DIABETES MELLITUS WITHOUT COMPLICATION, WITHOUT LONG-TERM CURRENT USE OF INSULIN: ICD-10-CM

## 2024-01-16 RX ORDER — HYALURONATE SODIUM 10 MG/ML
20 SYRINGE (ML) INTRAARTICULAR
Status: COMPLETED | OUTPATIENT
Start: 2024-01-16 | End: 2024-01-16

## 2024-01-16 RX ADMIN — Medication 20 MG: at 15:24

## 2024-01-16 RX ADMIN — Medication 20 MG: at 15:22

## 2024-01-16 NOTE — PROGRESS NOTES
"   Patient ID: Johanna Swan is a 68 y.o. female presents for 3rd and final injection of of the series of Euflexxa to bilateral knees. Reports improvement in knee pain since initiating the injection series.     Objective:  Pulse 94   Ht 160 cm (63\")   Wt 87.5 kg (193 lb)   LMP  (LMP Unknown)   SpO2 100%   BMI 34.19 kg/m²     Physical Examination:     Right knee:  Intact skin.  No effusion.  No warmth  Extension 0, flexion 100    Left Knee:  Intact skin.  No effusion.  No warmth  Extension 0, flexion 100    Imaging:   None new    Assessment:    Diagnoses and all orders for this visit:    1. Primary osteoarthritis of right knee (Primary)    2. Primary osteoarthritis of left knee    Other orders  -     Large Joint Arthrocentesis  -     Large Joint Arthrocentesis    Plan: Euflexxa HA injected to bilateral knees today.  Restrictions discussed.  Follow-up in 6 months for further evaluation of efficacy.  All questions answered.    Large Joint Arthrocentesis: R knee  Date/Time: 1/16/2024 3:22 PM  Consent given by: patient  Site marked: site marked  Timeout: Immediately prior to procedure a time out was called to verify the correct patient, procedure, equipment, support staff and site/side marked as required   Supporting Documentation  Indications: pain   Procedure Details  Location: knee - R knee  Preparation: Patient was prepped and draped in the usual sterile fashion  Needle size: 22 G  Approach: anterolateral  Medications administered: 20 mg Sodium Hyaluronate (Viscosup) 20 MG/2ML  Aspirate amount: 0 mL  Patient tolerance: patient tolerated the procedure well with no immediate complications      Large Joint Arthrocentesis: L knee  Date/Time: 1/16/2024 3:24 PM  Consent given by: patient  Site marked: site marked  Timeout: Immediately prior to procedure a time out was called to verify the correct patient, procedure, equipment, support staff and site/side marked as required   Supporting Documentation  Indications: " pain   Procedure Details  Location: knee - L knee  Preparation: Patient was prepped and draped in the usual sterile fashion  Needle size: 22 G  Approach: anterolateral  Medications administered: 20 mg Sodium Hyaluronate (Viscosup) 20 MG/2ML  Aspirate amount: 0 mL  Patient tolerance: patient tolerated the procedure well with no immediate complications       Disclaimer: Part of this note may be an electronic transcription/translation of spoken language to printed text using the Dragon Dictation System

## 2024-01-22 DIAGNOSIS — I10 PRIMARY HYPERTENSION: ICD-10-CM

## 2024-01-22 DIAGNOSIS — R00.2 PALPITATIONS: ICD-10-CM

## 2024-01-22 RX ORDER — METOPROLOL SUCCINATE 25 MG/1
25 TABLET, EXTENDED RELEASE ORAL DAILY
Qty: 90 TABLET | Refills: 0 | Status: SHIPPED | OUTPATIENT
Start: 2024-01-22

## 2024-02-06 DIAGNOSIS — E11.9 TYPE 2 DIABETES MELLITUS WITHOUT COMPLICATION, WITHOUT LONG-TERM CURRENT USE OF INSULIN: ICD-10-CM

## 2024-02-07 RX ORDER — TIRZEPATIDE 2.5 MG/.5ML
INJECTION, SOLUTION SUBCUTANEOUS
Qty: 4 ML | Refills: 0 | OUTPATIENT
Start: 2024-02-07

## 2024-02-07 NOTE — TELEPHONE ENCOUNTER
Please contact patient and inform I have received request for refill of Mounjuro 2.5 mg.  Ask how her blood sugars are doing with change from Trulicity to Mounjuro.  If she is having high readings (ie above 140 morning fasting ) we would want to increase Mounjuro to 5 mg weekly.  If she is still having any low sugars or side effects would not want to increase.

## 2024-02-07 NOTE — TELEPHONE ENCOUNTER
I called and spoke with the patient, she states she still had 1 more month of the Trulicity at her last office visit so she has not started the Mounjaro yet. She requested a refill because she likes to stay a month ahead.

## 2024-02-07 NOTE — TELEPHONE ENCOUNTER
Tell her to call me back after she has taken the Mounjuro for at least a couple of weeks to make sure she is not having side effects and then I can send in the higher dose.

## 2024-03-04 ENCOUNTER — TELEPHONE (OUTPATIENT)
Dept: FAMILY MEDICINE CLINIC | Facility: CLINIC | Age: 69
End: 2024-03-04
Payer: MEDICARE

## 2024-03-04 DIAGNOSIS — E11.9 TYPE 2 DIABETES MELLITUS WITHOUT COMPLICATION, WITHOUT LONG-TERM CURRENT USE OF INSULIN: ICD-10-CM

## 2024-03-04 NOTE — TELEPHONE ENCOUNTER
I called and informed the patient, she voiced understanding. She did start Mounjaro and is okay with increasing to 5 mg.

## 2024-03-04 NOTE — TELEPHONE ENCOUNTER
I have sent in 5 mg to use weekly for the next 4 weeks.  Her next appointment is not until April 12.  Assuming she will need another refill before the appointment.  Have her let me know in a month if she is continuing to not have hypoglycemia if she is wanting to increase dose of medication again.

## 2024-03-04 NOTE — TELEPHONE ENCOUNTER
"    Caller: Johanna Swan \"Dennis\"    Relationship to patient: Self    Best call back number: [phone]    Patient is needing:     CALLING TO REPORT BLOOD SUGAR READINGS FOR POSSIBLE MEDICATION ADJUSTMENT.     AVERAGE READINGS 120-HUMBERTO (FASTING)     ONE LOW ONE IN THE 80'S  ONE HIGH ONE IN 'S           "

## 2024-04-01 ENCOUNTER — POP HEALTH PHARMACY (OUTPATIENT)
Dept: PHARMACY | Facility: OTHER | Age: 69
End: 2024-04-01
Payer: MEDICARE

## 2024-04-01 DIAGNOSIS — E11.9 TYPE 2 DIABETES MELLITUS WITHOUT COMPLICATION, WITHOUT LONG-TERM CURRENT USE OF INSULIN: ICD-10-CM

## 2024-04-01 RX ORDER — TIRZEPATIDE 5 MG/.5ML
INJECTION, SOLUTION SUBCUTANEOUS
Qty: 4 ML | Refills: 0 | Status: SHIPPED | OUTPATIENT
Start: 2024-04-01

## 2024-04-09 DIAGNOSIS — E11.9 TYPE 2 DIABETES MELLITUS WITHOUT COMPLICATION, WITHOUT LONG-TERM CURRENT USE OF INSULIN: ICD-10-CM

## 2024-04-09 NOTE — TELEPHONE ENCOUNTER
"  Caller: Johanna Swan \"Dennis\"    Relationship: Self    Best call back number:   [place], Johanna WHEATLEY \"Dennis\" (Self) [phone] (Mobile)       What was the call regarding: [facility] HAS MOUNJARO IN STOCK AND THE RX NEEDS TO GO THERE    THEY HAVE 2.5 MG IN STOCK     CAN YOU SEND ASAP     Is it okay if the provider responds through MyChart:CALL IF NEEDED      PATIENT'S NEXT DOSE IS DUE ON SUNDAY     "

## 2024-04-10 NOTE — PROGRESS NOTES
Chief Complaint  Diabetes, Hypothyroidism, and Hyperlipidemia    History of Present Illness  Johanna Swan presents today for follow up on diabetes, hypothyroidism, and hyperlipidemia      Diabetes  Patient does not check blood sugar at home.  Associated symptoms include Blurred vision  Per patient current diet is Well Balanced.  Patient does avoid concentrated sweets.  Patient does see podiatry.  Patient see's Dr. Ledezma for diabetic eye exam and last eye exam was 2024.  Patient reports they are taking medications as prescribed and they are not having side effects.    Hypothyroidism  Patient presents for evaluation of thyroid function.   Symptoms consist of denies fatigue, weight changes, heat/cold intolerance, bowel/skin changes or CVS symptoms. Symptoms have present for several years.  The problem has been unchanged.    Patient reports they are taking medications as prescribed and they are not having side effects.    Hyperlipidemia  Patient is following a low cholesterol diet.   Currently is on statin therapy.  Patient reports is exercising.  Patient reports they are taking medications as prescribed and they are not having side effects.    Patient Care Team:  Spring Silverio MD as PCP - General (Family Medicine)  Spring Silverio MD as PCP - Family Medicine   Current Outpatient Medications on File Prior to Visit   Medication Sig    albuterol sulfate  (90 Base) MCG/ACT inhaler Inhale 2 puffs Every 6 (Six) Hours As Needed for Wheezing.    Amitiza 24 MCG capsule Take 1 capsule by mouth 2 (Two) Times a Day.    Azelastine HCl 137 MCG/SPRAY solution USE 1 TO 2 SPRAY(S) IN EACH NOSTRIL TWICE DAILY AS NEEDED FOR DRAINAGE, SNEEZING OR COUGH    benazepril (LOTENSIN) 10 MG tablet Take 1 tablet by mouth Daily.    benzonatate (TESSALON) 200 MG capsule Take 1 capsule by mouth 3 (Three) Times a Day As Needed for Cough.    dicyclomine (BENTYL) 10 MG capsule Take 1 capsule by mouth 4 (Four) Times a  Day Before Meals & at Bedtime.    DULoxetine (CYMBALTA) 60 MG capsule Take 1 capsule by mouth Daily.    EPINEPHrine (EPIPEN) 0.3 MG/0.3ML solution auto-injector injection     Erenumab-aooe (AIMOVIG) 140 MG/ML prefilled syringe Inject 0.0071 mL under the skin into the appropriate area as directed Every 30 (Thirty) Days.    esomeprazole (nexIUM) 40 MG capsule Take 1 capsule by mouth Daily.    glucose blood (OneTouch Verio) test strip Use as instructed    glucose blood (OneTouch Verio) test strip Use as instructed to check glucose once daily    guaiFENesin-codeine (GUAIFENESIN AC) 100-10 MG/5ML liquid Take 5 mL by mouth 3 (Three) Times a Day As Needed for Cough.    HYDROcodone-acetaminophen (NORCO) 5-325 MG per tablet Take 1 tablet by mouth 2 (Two) Times a Day As Needed.    hydrOXYzine (ATARAX) 25 MG tablet Take 1 tablet by mouth 3 (Three) Times a Day As Needed for Itching.    lidocaine (XYLOCAINE) 5 % ointment APPLY .5 G OF CREAM TO AFFECTED AREA NOT EXCEEDING MORE THEN 13 TUBE PER DAY.    Lidocaine-Tetracaine 7-7 % cream APPLY TO AFFECTED AREA TWICE DAILY AS NEEDED    Lyrica 150 MG capsule Take 1 capsule by mouth 2 (Two) Times a Day.    methylPREDNISolone (MEDROL) 4 MG dose pack Take as directed on package instructions.    metoprolol succinate XL (TOPROL-XL) 25 MG 24 hr tablet Take 1 tablet by mouth once daily    mometasone (Elocon) 0.1 % cream Apply to rash twice daily as needed    mometasone (NASONEX) 50 MCG/ACT nasal spray 2 sprays into the nostril(s) as directed by provider Daily.    NON FORMULARY Allergy shots    ondansetron (Zofran) 4 MG tablet 1-2 tablets every 6-8 hours if needed.    OneTouch Verio test strip USE AND DISCARD 1 TEST     STRIP DAILY AS DIRECTED    rizatriptan (MAXALT) 10 MG tablet Take 1 tablet by mouth 1 (One) Time As Needed for Migraine (May repeat in 2 hours if needed for persistent headache).    rosuvastatin (CRESTOR) 5 MG tablet Take 1 tablet by mouth Daily.    Synthroid 88 MCG tablet TAKE  "1 TABLET DAILY    tamsulosin (FLOMAX) 0.4 MG capsule 24 hr capsule Take 1 capsule by mouth Daily.    tiZANidine (ZANAFLEX) 4 MG tablet Take 1 tablet by mouth Every Night.    [DISCONTINUED] Tirzepatide (Mounjaro) 5 MG/0.5ML solution pen-injector pen Inject 0.5 mL under the skin into the appropriate area as directed 1 (One) Time Per Week.     No current facility-administered medications on file prior to visit.       Objective   Vital Signs:   /84 (BP Location: Right arm, Patient Position: Sitting, Cuff Size: Large Adult)   Pulse 94   Temp 97.8 °F (36.6 °C) (Temporal)   Resp 18   Ht 160 cm (63\")   Wt 86.2 kg (190 lb)   SpO2 100%   BMI 33.66 kg/m²    BP Readings from Last 3 Encounters:   04/12/24 130/84   01/09/24 112/72   10/28/23 106/84     Wt Readings from Last 3 Encounters:   04/12/24 86.2 kg (190 lb)   01/16/24 87.5 kg (193 lb)   01/09/24 87.5 kg (193 lb)         Physical Exam  Vitals and nursing note reviewed.   Constitutional:       General: She is not in acute distress.     Appearance: She is well-developed.   HENT:      Head: Normocephalic and atraumatic.   Cardiovascular:      Rate and Rhythm: Normal rate and regular rhythm.      Heart sounds: No murmur heard.  Pulmonary:      Effort: Pulmonary effort is normal.      Breath sounds: Normal breath sounds. No wheezing.   Musculoskeletal:         General: Normal range of motion.   Skin:     General: Skin is warm and dry.      Findings: No rash.   Neurological:      Mental Status: She is alert and oriented to person, place, and time.            No visits with results within 1 Day(s) from this visit.   Latest known visit with results is:   Results Encounter on 04/01/2024   Component Date Value Ref Range Status    Hemoglobin A1C 04/05/2024 6.6 (H)  4.8 - 5.6 % Final    Comment:          Prediabetes: 5.7 - 6.4           Diabetes: >6.4           Glycemic control for adults with diabetes: <7.0      Glucose 04/05/2024 118 (H)  70 - 99 mg/dL Final    BUN " 04/05/2024 18  8 - 27 mg/dL Final    Creatinine 04/05/2024 0.88  0.57 - 1.00 mg/dL Final    EGFR Result 04/05/2024 72  >59 mL/min/1.73 Final    BUN/Creatinine Ratio 04/05/2024 20  12 - 28 Final    Sodium 04/05/2024 143  134 - 144 mmol/L Final    Potassium 04/05/2024 4.5  3.5 - 5.2 mmol/L Final    Chloride 04/05/2024 106  96 - 106 mmol/L Final    Total CO2 04/05/2024 23  20 - 29 mmol/L Final    Calcium 04/05/2024 9.1  8.7 - 10.3 mg/dL Final    Total Protein 04/05/2024 7.0  6.0 - 8.5 g/dL Final    Albumin 04/05/2024 4.2  3.9 - 4.9 g/dL Final    Globulin 04/05/2024 2.8  1.5 - 4.5 g/dL Final    A/G Ratio 04/05/2024 1.5  1.2 - 2.2 Final    Total Bilirubin 04/05/2024 0.7  0.0 - 1.2 mg/dL Final    Alkaline Phosphatase 04/05/2024 104  44 - 121 IU/L Final    AST (SGOT) 04/05/2024 15  0 - 40 IU/L Final    ALT (SGPT) 04/05/2024 10  0 - 32 IU/L Final    Total Cholesterol 04/05/2024 163  100 - 199 mg/dL Final    Triglycerides 04/05/2024 142  0 - 149 mg/dL Final    HDL Cholesterol 04/05/2024 46  >39 mg/dL Final    VLDL Cholesterol Silverio 04/05/2024 25  5 - 40 mg/dL Final    LDL Chol Calc (NIH) 04/05/2024 92  0 - 99 mg/dL Final    TSH 04/05/2024 2.010  0.450 - 4.500 uIU/mL Final    Free T4 04/05/2024 1.26  0.82 - 1.77 ng/dL Final     A1C Last 3 Results          7/20/2023    10:17 1/8/2024    10:21 4/5/2024    11:36   HGBA1C Last 3 Results   Hemoglobin A1C 6.3  6.6  6.6      Lab Results   Component Value Date    CHLPL 163 04/05/2024    TRIG 142 04/05/2024    HDL 46 04/05/2024    LDL 92 04/05/2024     Lab Results   Component Value Date    TSH 2.010 04/05/2024     Lab Results   Component Value Date    GLUCOSE 118 (H) 04/05/2024    BUN 18 04/05/2024    CREATININE 0.88 04/05/2024    EGFRIFNONA 70 02/21/2022    EGFRIFAFRI 81 02/21/2022    BCR 20 04/05/2024    K 4.5 04/05/2024    CO2 23 04/05/2024    CALCIUM 9.1 04/05/2024    PROTENTOTREF 7.0 04/05/2024    ALBUMIN 4.2 04/05/2024    LABIL2 1.5 04/05/2024    AST 15 04/05/2024    ALT 10  04/05/2024     Lab Results   Component Value Date    WBC 5.0 04/21/2023    HGB 12.3 04/21/2023    HCT 37.8 04/21/2023    MCV 87 04/21/2023     04/21/2023                      Assessment and Plan    Diagnoses and all orders for this visit:    1. Type 2 diabetes mellitus without complication, without long-term current use of insulin (Primary)  -     Lancets Misc. kit; Brand to match current supply of lancets DX E11.9  Dispense: 1 each; Refill: 0  -     Tirzepatide (MOUNJARO) 7.5 MG/0.5ML solution pen-injector pen; Inject 0.5 mL under the skin into the appropriate area as directed 1 (One) Time Per Week.  Dispense: 2 mL; Refill: 0  -     Hemoglobin A1c; Future  -     Comprehensive Metabolic Panel; Future    2. Acquired hypothyroidism    3. Mixed hyperlipidemia    4. Class 1 obesity with body mass index (BMI) of 33.0 to 33.9 in adult, unspecified obesity type, unspecified whether serious comorbidity present      Diabetes, stable, A1c is remaining at 6.6. Mounjuro currently 5 mg  Weekly she is tolerating well and has been losing weight. Increasing to 7.5 mg weekly.  Prescription for lancet device.  Patient has misplaced hers.    Hyperlipidemia incredible improvement since restarting statin therapy  After significant elevation 3 months ago.  Continue Crestor 5 mg daily     Hypothyroidism, at goal, continue same dose Synthroid 88 mcg daily      Medications Discontinued During This Encounter   Medication Reason    Tirzepatide (Mounjaro) 5 MG/0.5ML solution pen-injector pen          Follow Up     Return in about 3 months (around 7/12/2024) for diabetes weight managment.    Patient was given instructions and counseling regarding her condition or for health maintenance advice. Please see specific information pulled into the AVS if appropriate.

## 2024-04-12 ENCOUNTER — OFFICE VISIT (OUTPATIENT)
Dept: FAMILY MEDICINE CLINIC | Facility: CLINIC | Age: 69
End: 2024-04-12
Payer: MEDICARE

## 2024-04-12 VITALS
SYSTOLIC BLOOD PRESSURE: 130 MMHG | HEART RATE: 94 BPM | TEMPERATURE: 97.8 F | RESPIRATION RATE: 18 BRPM | DIASTOLIC BLOOD PRESSURE: 84 MMHG | HEIGHT: 63 IN | OXYGEN SATURATION: 100 % | WEIGHT: 190 LBS | BODY MASS INDEX: 33.66 KG/M2

## 2024-04-12 DIAGNOSIS — E78.2 MIXED HYPERLIPIDEMIA: ICD-10-CM

## 2024-04-12 DIAGNOSIS — E66.9 CLASS 1 OBESITY WITH BODY MASS INDEX (BMI) OF 33.0 TO 33.9 IN ADULT, UNSPECIFIED OBESITY TYPE, UNSPECIFIED WHETHER SERIOUS COMORBIDITY PRESENT: ICD-10-CM

## 2024-04-12 DIAGNOSIS — E11.9 TYPE 2 DIABETES MELLITUS WITHOUT COMPLICATION, WITHOUT LONG-TERM CURRENT USE OF INSULIN: Primary | ICD-10-CM

## 2024-04-12 DIAGNOSIS — E03.9 ACQUIRED HYPOTHYROIDISM: ICD-10-CM

## 2024-04-15 ENCOUNTER — TELEPHONE (OUTPATIENT)
Dept: FAMILY MEDICINE CLINIC | Facility: CLINIC | Age: 69
End: 2024-04-15
Payer: MEDICARE

## 2024-04-15 NOTE — TELEPHONE ENCOUNTER
Yes, she will have to continue the 5 mg until insurance allows the 7.5 mg to be filled.  She will probably have to remind the pharmacy to send the higher dose

## 2024-04-15 NOTE — TELEPHONE ENCOUNTER
Spoke with [facility]ait[facility] to confirm that they are to fill the mounjaro 7.5 sent over on 04/12/2024

## 2024-04-15 NOTE — TELEPHONE ENCOUNTER
Pharmacy cannot fill the 7.5 mg yet because the 5 mg was already sent out on 04/09/2024. They can send the 7.5 mg as soon as insurance allows it. Should the patient just take the 5 mg until then?

## 2024-04-17 ENCOUNTER — POP HEALTH PHARMACY (OUTPATIENT)
Dept: PHARMACY | Facility: OTHER | Age: 69
End: 2024-04-17
Payer: MEDICARE

## 2024-04-20 DIAGNOSIS — E03.9 ACQUIRED HYPOTHYROIDISM: ICD-10-CM

## 2024-04-22 RX ORDER — LEVOTHYROXINE SODIUM 88 MCG
88 TABLET ORAL DAILY
Qty: 90 TABLET | Refills: 1 | Status: SHIPPED | OUTPATIENT
Start: 2024-04-22

## 2024-05-03 ENCOUNTER — EXTERNAL PBMM DATA (OUTPATIENT)
Dept: PHARMACY | Facility: OTHER | Age: 69
End: 2024-05-03
Payer: MEDICARE

## 2024-05-07 RX ORDER — HYDROXYZINE HYDROCHLORIDE 25 MG/1
25 TABLET, FILM COATED ORAL 3 TIMES DAILY PRN
Qty: 30 TABLET | Refills: 0 | Status: SHIPPED | OUTPATIENT
Start: 2024-05-07

## 2024-05-20 DIAGNOSIS — E11.9 TYPE 2 DIABETES MELLITUS WITHOUT COMPLICATION, WITHOUT LONG-TERM CURRENT USE OF INSULIN: ICD-10-CM

## 2024-05-20 NOTE — TELEPHONE ENCOUNTER
Pharmacy Name: [facility] #[id number] - [place], [place] - [address] - [phone]  - [phone] FX     What medication are you calling in regards to: Tirzepatide (MOUNJARO) 7.5 MG/0.5ML solution pen-injector pen     What question does the pharmacy have: PATIENT WOULD LIKE MEDICATION SENT TO [facility] SINCE THEY HAVE IT IN STOCK

## 2024-05-23 ENCOUNTER — TELEPHONE (OUTPATIENT)
Dept: FAMILY MEDICINE CLINIC | Facility: CLINIC | Age: 69
End: 2024-05-23
Payer: MEDICARE

## 2024-05-23 DIAGNOSIS — Z86.010 HISTORY OF COLON POLYPS: Primary | ICD-10-CM

## 2024-05-23 NOTE — TELEPHONE ENCOUNTER
I called and spoke with the patient, she needs a new referral for Dr. Alcazar because Dr. Kent left the practice. Her next appointment is actually 08/19 and not 06/19.

## 2024-05-24 DIAGNOSIS — E78.2 MIXED HYPERLIPIDEMIA: ICD-10-CM

## 2024-05-24 RX ORDER — ROSUVASTATIN CALCIUM 5 MG/1
5 TABLET, COATED ORAL DAILY
Qty: 90 TABLET | Refills: 1 | Status: SHIPPED | OUTPATIENT
Start: 2024-05-24

## 2024-05-27 NOTE — TELEPHONE ENCOUNTER
Appears last referral was in 2019 for history Polyp & Tubular Adenoma found on previous  Colonoscopy on 3/24/2015.  This is the reason for the referral or does she have other diagnoses or concerns?

## 2024-06-05 DIAGNOSIS — E11.9 TYPE 2 DIABETES MELLITUS WITHOUT COMPLICATION, WITHOUT LONG-TERM CURRENT USE OF INSULIN: ICD-10-CM

## 2024-06-05 RX ORDER — TIRZEPATIDE 7.5 MG/.5ML
INJECTION, SOLUTION SUBCUTANEOUS
Qty: 2 ML | Refills: 1 | Status: SHIPPED | OUTPATIENT
Start: 2024-06-05

## 2024-06-05 NOTE — TELEPHONE ENCOUNTER
Please contact patient and inform I have received a refill request for Mounjuro 7.5 mg.  I have not seen her since we increase this to this dose in April.  Want to confirm she is doing okay with this dosing does not want to increase further to 10 mg weekly.

## 2024-06-19 ENCOUNTER — PATIENT MESSAGE (OUTPATIENT)
Dept: FAMILY MEDICINE CLINIC | Facility: CLINIC | Age: 69
End: 2024-06-19
Payer: MEDICARE

## 2024-06-19 DIAGNOSIS — E11.9 TYPE 2 DIABETES MELLITUS WITHOUT COMPLICATION, WITHOUT LONG-TERM CURRENT USE OF INSULIN: ICD-10-CM

## 2024-06-21 NOTE — TELEPHONE ENCOUNTER
From: Johanna Swan  To: Spring Silverio  Sent: 6/19/2024 5:26 PM EDT  Subject: Jose David Collins I have not picked up the .7 [place] yet and I know we talked about me going up to the 10? I wanted to see if you could go ahead and send over a prescription four for the higher dosage to the [facility] because I have not picked up the 7 yet and I’m not having any problems. Thank you so much for everything you do I did talk to Melida about this and she told me to message you so she already knows. Thank you so much

## 2024-07-01 DIAGNOSIS — I10 PRIMARY HYPERTENSION: ICD-10-CM

## 2024-07-01 DIAGNOSIS — R00.2 PALPITATIONS: ICD-10-CM

## 2024-07-01 RX ORDER — METOPROLOL SUCCINATE 25 MG/1
25 TABLET, EXTENDED RELEASE ORAL DAILY
Qty: 90 TABLET | Refills: 0 | Status: SHIPPED | OUTPATIENT
Start: 2024-07-01

## 2024-07-15 ENCOUNTER — TELEPHONE (OUTPATIENT)
Dept: FAMILY MEDICINE CLINIC | Facility: CLINIC | Age: 69
End: 2024-07-15
Payer: MEDICARE

## 2024-07-15 DIAGNOSIS — G89.29 CHRONIC BILATERAL LOW BACK PAIN WITH BILATERAL SCIATICA: Chronic | ICD-10-CM

## 2024-07-15 DIAGNOSIS — M54.41 CHRONIC BILATERAL LOW BACK PAIN WITH BILATERAL SCIATICA: Chronic | ICD-10-CM

## 2024-07-15 DIAGNOSIS — M48.02 SPINAL STENOSIS OF CERVICAL REGION: ICD-10-CM

## 2024-07-15 DIAGNOSIS — M54.6 CHRONIC BILATERAL THORACIC BACK PAIN: ICD-10-CM

## 2024-07-15 DIAGNOSIS — M48.062 SPINAL STENOSIS OF LUMBAR REGION WITH NEUROGENIC CLAUDICATION: ICD-10-CM

## 2024-07-15 DIAGNOSIS — M47.16 LUMBAR SPONDYLOSIS WITH MYELOPATHY: ICD-10-CM

## 2024-07-15 DIAGNOSIS — M50.30 DEGENERATIVE DISC DISEASE, CERVICAL: ICD-10-CM

## 2024-07-15 DIAGNOSIS — M54.42 CHRONIC BILATERAL LOW BACK PAIN WITH BILATERAL SCIATICA: Chronic | ICD-10-CM

## 2024-07-15 DIAGNOSIS — M48.04 SPINAL STENOSIS OF THORACIC REGION: ICD-10-CM

## 2024-07-15 DIAGNOSIS — M47.12 CERVICAL SPONDYLOSIS WITH MYELOPATHY: ICD-10-CM

## 2024-07-15 DIAGNOSIS — G89.29 CHRONIC BILATERAL THORACIC BACK PAIN: ICD-10-CM

## 2024-07-15 DIAGNOSIS — M51.37 DEGENERATION OF LUMBAR OR LUMBOSACRAL INTERVERTEBRAL DISC: Primary | ICD-10-CM

## 2024-07-15 NOTE — TELEPHONE ENCOUNTER
Patient is requesting a referral to [facility] downstairs. She has been seeing a pain maangement facility outside of [facility] for quite some time but would prefer to see [facility].

## 2024-07-16 NOTE — TELEPHONE ENCOUNTER
"Maternal History:  The mother is a 24 y.o.    with an estimated date of conception of  She  has no past medical history on file. Prenatal Labs Review:   The pregnancy was . Prenatal ultrasound revealed . Prenatal care was . Mother received . Onset of labor:  and was .  Membranes ruptured on   at   by  . There  a maternal fever.    Delivery Information:  Infant delivered on 2022 at 12:14 PM by , Low Transverse.  indicated. Anesthesia . Apgars were Apgars: 1Min.: 8 5 Min.: 9 10 Min.:  . Amniotic fluid amount  ; color  .  Intervention/Resuscitation: .    Scheduled Meds:    ampicillin IV syringe (NICU/PICU/PEDS) (standard concentration)  100 mg/kg Intravenous Q12H    dextrose 10 % in water (D10W)        erythromycin        gentamicin IV syringe (NICU/PICU/PEDS)  4 mg/kg Intravenous Q24H    heparin, porcine (PF)        heparin, porcine (PF)        phytonadione vitamin k         Continuous Infusions:    [START ON 2022] dextrose 10 % in water (D10W) 10 % with heparin, porcine (PF) (heparin flush 100 units/mL) 100 unit/mL 125 Units      AA 3% no.2 ped-D10-calcium-hep 7.1 mL/hr at 22 1334     PRN Meds:     Nutritional Support: Parenteral: TPN (See Orders)    Objective:     Vital Signs (Most Recent):  Temp: 98 °F (36.7 °C) (22 1315)  Pulse: 150 (22 1350)  Resp: 48 (22 1350)  BP: (!) 69/36 (22 1315)  SpO2: (!) 100 % (22 1350)   Vital Signs (24h Range):  Temp:  [97.7 °F (36.5 °C)-98 °F (36.7 °C)] 98 °F (36.7 °C)  Pulse:  [130-150] 150  Resp:  [48-55] 48  SpO2:  [95 %-100 %] 100 %  BP: (69-77)/(25-39) 69/36     Anthropometrics:  Head Circumference: 30.5 cm   Weight: 2154 g (4 lb 12 oz) 19 %ile (Z= -0.88) based on Ismael (Boys, 22-50 Weeks) weight-for-age data using vitals from 2022.  Height: 48.3 cm (19") 80 %ile (Z= 0.84) based on Ismael (Boys, 22-50 Weeks) Length-for-age data based on Length recorded on 2022.     Physical Exam  Vitals reviewed.   Pulmonary:    " Need diagnosis and purpose of referral.  She has multiple degenerative/arthritic conditions.  Visit for back pain, knee pain etc.?  Is she wanting pain medication management, injections, or something else?  If not already available would request last office note from current pain management provider.     Effort: Nasal flaring and retractions present.      Breath sounds: Rales present.       Laboratory:  CBC:   Recent Labs   Lab 07/08/22  1301   WBC 8.52*   RBC 4.29   HGB 16.0   HCT 46.1          Diagnostic Results:  X-Ray: Reviewed

## 2024-07-16 NOTE — TELEPHONE ENCOUNTER
Spoke with the patient, she states she has been going to [facility] of [facility] in [place] for cervical, thoracic, and lumbar pain. She has been getting epidural and trigger point injections for several years.    I called and spoke with the patient's current pain management office, they are faxing over last office note from 06/21/2024 now.

## 2024-07-24 NOTE — PROGRESS NOTES
Chief Complaint  Diabetes and Obesity    History of Present Illness  Johanna Swan presents today for follow up on diabetes and weight management      Diabetes  Patient does check blood sugar at home occasionally in am's 64 to 102  Associated symptoms include Neuropathy  Per patient current diet is Variety of foods.  Patient does not avoid concentrated sweets.  Patient does see podiatry.  Patient see's [facility] for diabetic eye exam and last eye exam was 2024.  Patient reports they are taking Mounjuro as prescribed and they are not having side effects. But do not get hungry and have to remind self to eat.  Have not increased Mounjuro to 10 mg have continued 7.5 mg for the past month.     Weight Management  Patient is currently taking Mounjuro.  Patient reports they are taking medications as prescribed and they are not having side effects.  Current diet is Variety of foods  Patient has lost  3 lbs in 3 months  Patient is not exercising.     Patient Care Team:  Spring Silverio MD as PCP - General (Family Medicine)  Spring Silverio MD as PCP - Family Medicine   Current Outpatient Medications on File Prior to Visit   Medication Sig    albuterol sulfate  (90 Base) MCG/ACT inhaler Inhale 2 puffs Every 6 (Six) Hours As Needed for Wheezing.    Amitiza 24 MCG capsule Take 1 capsule by mouth 2 (Two) Times a Day.    Azelastine HCl 137 MCG/SPRAY solution USE 1 TO 2 SPRAY(S) IN EACH NOSTRIL TWICE DAILY AS NEEDED FOR DRAINAGE, SNEEZING OR COUGH    benazepril (LOTENSIN) 10 MG tablet Take 1 tablet by mouth Daily.    benzonatate (TESSALON) 200 MG capsule Take 1 capsule by mouth 3 (Three) Times a Day As Needed for Cough.    dicyclomine (BENTYL) 10 MG capsule Take 1 capsule by mouth 4 (Four) Times a Day Before Meals & at Bedtime.    DULoxetine (CYMBALTA) 60 MG capsule Take 1 capsule by mouth Daily.    EPINEPHrine (EPIPEN) 0.3 MG/0.3ML solution auto-injector injection     Erenumab-aooe (AIMOVIG) 140 MG/ML  prefilled syringe Inject 0.0071 mL under the skin into the appropriate area as directed Every 30 (Thirty) Days.    esomeprazole (nexIUM) 40 MG capsule Take 1 capsule by mouth Daily.    glucose blood (OneTouch Verio) test strip Use as instructed    glucose blood (OneTouch Verio) test strip Use as instructed to check glucose once daily    guaiFENesin-codeine (GUAIFENESIN AC) 100-10 MG/5ML liquid Take 5 mL by mouth 3 (Three) Times a Day As Needed for Cough.    HYDROcodone-acetaminophen (NORCO) 5-325 MG per tablet Take 1 tablet by mouth 2 (Two) Times a Day As Needed.    hydrOXYzine (ATARAX) 25 MG tablet Take 1 tablet by mouth 3 (Three) Times a Day As Needed for Itching.    Lancets Misc. kit Brand to match current supply of lancets DX E11.9    lidocaine (XYLOCAINE) 5 % ointment APPLY .5 G OF CREAM TO AFFECTED AREA NOT EXCEEDING MORE THEN 13 TUBE PER DAY.    Lidocaine-Tetracaine 7-7 % cream APPLY TO AFFECTED AREA TWICE DAILY AS NEEDED    Lyrica 150 MG capsule Take 1 capsule by mouth 2 (Two) Times a Day.    methylPREDNISolone (MEDROL) 4 MG dose pack Take as directed on package instructions.    metoprolol succinate XL (TOPROL-XL) 25 MG 24 hr tablet Take 1 tablet by mouth once daily    mometasone (Elocon) 0.1 % cream Apply to rash twice daily as needed    mometasone (NASONEX) 50 MCG/ACT nasal spray 2 sprays into the nostril(s) as directed by provider Daily.    NON FORMULARY Allergy shots    ondansetron (Zofran) 4 MG tablet 1-2 tablets every 6-8 hours if needed.    OneTouch Verio test strip USE AND DISCARD 1 TEST     STRIP DAILY AS DIRECTED    rizatriptan (MAXALT) 10 MG tablet Take 1 tablet by mouth 1 (One) Time As Needed for Migraine (May repeat in 2 hours if needed for persistent headache).    rosuvastatin (CRESTOR) 5 MG tablet TAKE 1 TABLET BY MOUTH DAILY.    Synthroid 88 MCG tablet TAKE 1 TABLET DAILY    tamsulosin (FLOMAX) 0.4 MG capsule 24 hr capsule Take 1 capsule by mouth Daily.    tiZANidine (ZANAFLEX) 4 MG tablet  "Take 1 tablet by mouth Every Night.     No current facility-administered medications on file prior to visit.       Objective   Vital Signs:   /76 (BP Location: Right arm, Patient Position: Sitting, Cuff Size: Large Adult)   Pulse 102   Temp 97.1 °F (36.2 °C) (Temporal)   Resp 18   Ht 160 cm (63\")   Wt 84.8 kg (187 lb)   SpO2 93%   BMI 33.13 kg/m²    BP Readings from Last 3 Encounters:   07/26/24 130/76   04/12/24 130/84   01/09/24 112/72     Wt Readings from Last 3 Encounters:   07/26/24 84.8 kg (187 lb)   04/12/24 86.2 kg (190 lb)   01/16/24 87.5 kg (193 lb)         Physical Exam  Vitals and nursing note reviewed.   Constitutional:       General: She is not in acute distress.     Appearance: She is well-developed. She is obese.   HENT:      Head: Normocephalic and atraumatic.   Cardiovascular:      Rate and Rhythm: Normal rate and regular rhythm.      Heart sounds: No murmur heard.  Pulmonary:      Effort: Pulmonary effort is normal.      Breath sounds: Normal breath sounds. No wheezing.   Musculoskeletal:         General: Normal range of motion.   Skin:     General: Skin is warm and dry.      Findings: No rash.   Neurological:      Mental Status: She is alert and oriented to person, place, and time.   Psychiatric:         Mood and Affect: Mood normal.            Office Visit on 07/26/2024   Component Date Value Ref Range Status    Microalbumin, Urine 07/26/2024 20   Final    Creatinine, Urine 07/26/2024 n/a   Final    Lot Number 07/26/2024 [id number]   Corrected    Expiration Date 07/26/2024 05/31/2025   Corrected     A1C Last 3 Results          1/8/2024    10:21 4/5/2024    11:36 7/23/2024    15:24   HGBA1C Last 3 Results   Hemoglobin A1C 6.6  6.6  6.1      Lab Results   Component Value Date    CHLPL 163 04/05/2024    TRIG 142 04/05/2024    HDL 46 04/05/2024    LDL 92 04/05/2024     Lab Results   Component Value Date    TSH 2.010 04/05/2024     Lab Results   Component Value Date    GLUCOSE 89 " 07/23/2024    BUN 13 07/23/2024    CREATININE 0.98 07/23/2024    EGFRIFNONA 70 02/21/2022    EGFRIFAFRI 81 02/21/2022    BCR 13 07/23/2024    K 4.6 07/23/2024    CO2 24 07/23/2024    CALCIUM 9.6 07/23/2024    PROTENTOTREF 7.3 07/23/2024    ALBUMIN 4.3 07/23/2024    LABIL2 1.5 04/05/2024    AST 15 07/23/2024    ALT 12 07/23/2024     Lab Results   Component Value Date    WBC 5.0 04/21/2023    HGB 12.3 04/21/2023    HCT 37.8 04/21/2023    MCV 87 04/21/2023     04/21/2023                      Assessment and Plan    Diagnoses and all orders for this visit:    1. Type 2 diabetes mellitus without complication, without long-term current use of insulin (Primary)  -     POCT microalbumin  -     Tirzepatide (MOUNJARO) 7.5 MG/0.5ML solution pen-injector pen; Inject 0.5 mL under the skin into the appropriate area as directed 1 (One) Time Per Week.  Dispense: 2 mL; Refill: 3  -     Hemoglobin A1c; Future  -     Comprehensive Metabolic Panel; Future    2. Class 1 obesity with body mass index (BMI) of 33.0 to 33.9 in adult, unspecified obesity type, unspecified whether serious comorbidity present    3. Degeneration of lumbar or lumbosacral intervertebral disc  -     MRI Lumbar Spine Without Contrast; Future  -     XR Spine Lumbar Complete 4+VW    4. Spinal stenosis of lumbar region with neurogenic claudication  -     MRI Lumbar Spine Without Contrast; Future  -     XR Spine Lumbar Complete 4+VW    5. Lumbar spondylosis with myelopathy  -     MRI Lumbar Spine Without Contrast; Future  -     XR Spine Lumbar Complete 4+VW    6. Chronic bilateral low back pain with bilateral sciatica  -     MRI Lumbar Spine Without Contrast; Future  -     XR Spine Lumbar Complete 4+VW    7. Chronic pain of both knees    8. Irritable bowel syndrome with both constipation and diarrhea    9. Menopause  -     DEXA Bone Density Axial; Future    10. Encounter for screening mammogram for breast cancer  -     Mammo Screening Digital Tomosynthesis  Bilateral With CAD; Future      Diabetes, excellent control on Mounjuro.  Patient has had side effects with diarrhea but they are improving.  She has not yet increased to 7.5 mg and is continuing to have significant appetite suppression.  We will hold at the current 7.5 mg dose due to those side effects and excellent diabetes control.  May consider increasing in the future if appetite suppression wanes.  Do recommend ongoing efforts at weight reduction, stop eating when full, do recommend eating 3 times daily at least 2 to 300 gaye per meal.    Chronic lumbar back pain from lumbar degenerative disc disease and spinal stenosis.  Patient is changing pain management to local provider.  She states she believes she has enough Lyrica and pain creams last however depends on when they actually schedule her first appointment.  She has been getting a prescription for ketamine/gabapentin/ibuprofen/lidocaine/baclofen at 4/10/3/4/2% and applying 1 to 2 g/1-2 pumps to her knees and lower back up to 4 times daily, but typically only once or twice a day.  This is obtained through [facility] pharmacy in [place] at [address] [place] [address] , [address] phone #[phone] #[phone]    At the request of pain management requiring imaging for the past year before they will schedule an appointment, I have placed order for lumbar x-ray that she is completing today and MRI and she is desiring future intervention, specifically would like nerve ablation therapy.  She has previously received epidurals which helped when they were performed by Dr. Scott many years ago but have not been helpful since.  She has had previous nerve ablation in the lumbar spine which was helpful.  She has also been seeing Ortho, Dr. Eugenio Duffy for her knees and has had Synvisc injections.    She is nearly due for mammogram and DEXA scan, placing orders now to be done next month.      Medications Discontinued During This Encounter   Medication Reason    Tirzepatide  (MOUNJARO) 10 MG/0.5ML solution pen-injector pen Dose adjustment         Follow Up     Return in about 3 months (around 10/26/2024) for diabetes, with Labs.    Patient was given instructions and counseling regarding her condition or for health maintenance advice. Please see specific information pulled into the AVS if appropriate.

## 2024-07-26 ENCOUNTER — OFFICE VISIT (OUTPATIENT)
Dept: FAMILY MEDICINE CLINIC | Facility: CLINIC | Age: 69
End: 2024-07-26
Payer: MEDICARE

## 2024-07-26 ENCOUNTER — HOSPITAL ENCOUNTER (OUTPATIENT)
Dept: GENERAL RADIOLOGY | Facility: HOSPITAL | Age: 69
Discharge: HOME | End: 2024-07-26
Payer: MEDICARE

## 2024-07-26 VITALS
DIASTOLIC BLOOD PRESSURE: 76 MMHG | HEART RATE: 102 BPM | SYSTOLIC BLOOD PRESSURE: 130 MMHG | OXYGEN SATURATION: 93 % | RESPIRATION RATE: 18 BRPM | BODY MASS INDEX: 33.13 KG/M2 | HEIGHT: 63 IN | TEMPERATURE: 97.1 F | WEIGHT: 187 LBS

## 2024-07-26 DIAGNOSIS — G89.29 CHRONIC PAIN OF BOTH KNEES: ICD-10-CM

## 2024-07-26 DIAGNOSIS — E66.9 CLASS 1 OBESITY WITH BODY MASS INDEX (BMI) OF 33.0 TO 33.9 IN ADULT, UNSPECIFIED OBESITY TYPE, UNSPECIFIED WHETHER SERIOUS COMORBIDITY PRESENT: ICD-10-CM

## 2024-07-26 DIAGNOSIS — M48.062 SPINAL STENOSIS OF LUMBAR REGION WITH NEUROGENIC CLAUDICATION: ICD-10-CM

## 2024-07-26 DIAGNOSIS — G89.29 CHRONIC BILATERAL LOW BACK PAIN WITH BILATERAL SCIATICA: ICD-10-CM

## 2024-07-26 DIAGNOSIS — M47.16 LUMBAR SPONDYLOSIS WITH MYELOPATHY: ICD-10-CM

## 2024-07-26 DIAGNOSIS — M51.37 DEGENERATION OF LUMBAR OR LUMBOSACRAL INTERVERTEBRAL DISC: ICD-10-CM

## 2024-07-26 DIAGNOSIS — M25.562 CHRONIC PAIN OF BOTH KNEES: ICD-10-CM

## 2024-07-26 DIAGNOSIS — M54.42 CHRONIC BILATERAL LOW BACK PAIN WITH BILATERAL SCIATICA: ICD-10-CM

## 2024-07-26 DIAGNOSIS — K58.2 IRRITABLE BOWEL SYNDROME WITH BOTH CONSTIPATION AND DIARRHEA: ICD-10-CM

## 2024-07-26 DIAGNOSIS — E11.9 TYPE 2 DIABETES MELLITUS WITHOUT COMPLICATION, WITHOUT LONG-TERM CURRENT USE OF INSULIN: Primary | ICD-10-CM

## 2024-07-26 DIAGNOSIS — M25.561 CHRONIC PAIN OF BOTH KNEES: ICD-10-CM

## 2024-07-26 DIAGNOSIS — M54.41 CHRONIC BILATERAL LOW BACK PAIN WITH BILATERAL SCIATICA: ICD-10-CM

## 2024-07-26 DIAGNOSIS — Z12.31 ENCOUNTER FOR SCREENING MAMMOGRAM FOR BREAST CANCER: ICD-10-CM

## 2024-07-26 DIAGNOSIS — Z78.0 MENOPAUSE: ICD-10-CM

## 2024-07-26 LAB
EXPIRATION DATE: NORMAL
Lab: NORMAL
POC CREATININE URINE: NORMAL
POC MICROALBUMIN URINE: 20

## 2024-07-26 PROCEDURE — 72110 X-RAY EXAM L-2 SPINE 4/>VWS: CPT

## 2024-07-29 NOTE — PROGRESS NOTES
I have sent the following message to patient through [facility]:  Dennis,  X-ray of the lumbar spine does show Lumbar degenerative disc disease with some disc narrowing and facet arthritis as well as anterior listhesis of L4 on L5,  And some scoliosis.  Do recommend getting MRI as ordered for further evaluation.  Spring Silverio MD

## 2024-07-30 ENCOUNTER — TELEPHONE (OUTPATIENT)
Dept: ORTHOPEDIC SURGERY | Facility: CLINIC | Age: 69
End: 2024-07-30

## 2024-07-30 ENCOUNTER — TELEPHONE (OUTPATIENT)
Dept: FAMILY MEDICINE CLINIC | Facility: CLINIC | Age: 69
End: 2024-07-30
Payer: MEDICARE

## 2024-07-30 NOTE — TELEPHONE ENCOUNTER
Patient completed xray from 07/26/2024 and has been advised to complete the MRI Lumbar Spine Without Contrast order in her chart. Please advise as patient needs pre cert completed prior.

## 2024-07-30 NOTE — TELEPHONE ENCOUNTER
"    Caller: Johanna Swan \"Dennis\"    Relationship to patient: Self    Best call back number: [phone] (home)      Chief complaint: BRIANNA KNEE PAIN     Type of visit: GEL INJECTION BRIANNA KNEE     Requested date: 08/08/24 IF POSSIBLE SHE HAS ANOTHER APPOINTMENT THAT DAY.      If rescheduling, when is the original appointment: NA      Additional notes:NA        "

## 2024-07-31 NOTE — TELEPHONE ENCOUNTER
Called left voicemail for patient to call office as she will need follow up visit for Right knee to get gel injection. Last gel injection done on 01/16/2024.

## 2024-08-05 ENCOUNTER — OFFICE VISIT (OUTPATIENT)
Dept: ORTHOPEDIC SURGERY | Facility: CLINIC | Age: 69
End: 2024-08-05
Payer: MEDICARE

## 2024-08-05 VITALS
RESPIRATION RATE: 20 BRPM | OXYGEN SATURATION: 100 % | WEIGHT: 187 LBS | HEART RATE: 87 BPM | HEIGHT: 63 IN | BODY MASS INDEX: 33.13 KG/M2

## 2024-08-05 DIAGNOSIS — M17.12 PRIMARY OSTEOARTHRITIS OF LEFT KNEE: Primary | ICD-10-CM

## 2024-08-05 DIAGNOSIS — M17.11 PRIMARY OSTEOARTHRITIS OF RIGHT KNEE: ICD-10-CM

## 2024-08-05 NOTE — PROGRESS NOTES
"   Patient ID: Johanna Swan is a 68 y.o. female presents with her , Angel , for further follow-up on bilateral knee pain, left greater than right,  secondary to known osteoarthritis. Provocative: long periods of standing, walking, kneeling/squatting.  Reports her knee pain inhibits her from performing activities of daily living such as taking care of her home and cleaning. Past treatments: steroid injections (until they were no longer beneficial -Pain management of Radha), topical-pain creams, HA injections of Euflexxa finished on 1/16/2024 that provided good relief until pain crept back July 2017, hydrocodone,       Objective:  Pulse 87   Resp 20   Ht 160 cm (63\")   Wt 84.8 kg (187 lb)   LMP  (LMP Unknown)   SpO2 100%   BMI 33.13 kg/m²     Physical Examination:    Mild-Antalgic gait    Right knee:  Intact skin.  Warmth.  Trace effusion  Tenderness along the joint lines (medial) and the popliteal space  Extension 0, flexion 95 with mild stiffness  Laxity with varus/valgus stress  Positive retropatellar crepitus  Negative calf tenderness  Range of motion to the hip and ankle grossly intact    Left knee:  Intact skin.  Warmth.  Trace to mild effusion.  Tenderness along the joint line, medial greater than lateral  Extension 1, flexion 100 with stiffness and pain  Laxity varus valgus stress  Positive retropatellar crepitus  Negative calf tenderness  Range of motion to the hip and ankle grossly intact    Imaging:   XR Knee 4+ View Bilateral (08/05/2024 15:09)       XR Knee 3 View Bilateral (08/09/2023 14:04)   Findings:  Right knee: There is joint space narrowing in the medial compartment with marked irregularity of the articular surfaces in the medial compartment and to a lesser degree the lateral compartment. There is spur formation along the intercondylar notch and   medial lateral joint line as well as advanced patellofemoral joint disease. There is a small joint effusion.  Left knee: There is " joint space narrowing predominantly in the medial compartment with some compensatory widening of the lateral compartment. Marginal spur formation is identified medially, laterally and along the intercondylar notch and there is   advanced patellofemoral joint disease. There is a small joint effusion.     IMPRESSION:    Advanced bilateral tricompartmental osteoarthritis    Assessment:    Diagnoses and all orders for this visit:    1. Primary osteoarthritis of left knee (Primary)  -     XR Knee 4+ View Bilateral  -     Visco Treatment; Future    2. Primary osteoarthritis of right knee  -     XR Knee 4+ View Bilateral  -     Visco Treatment; Future    Plan: Recommend continued journey with weight loss.  Also, recommend repeating hyaluronic acid injection to bilateral knees, Euflexxa.  Also recommend Drytec knee brace to the left knee for providing added support.       DME  Greater than 15 minutes was spent demonstrating proper fit and use of the DryTec knee brace and signs to monitor for complications      Disclaimer: Part of this note may be an electronic transcription/translation of spoken language to printed text using the Dragon Dictation System

## 2024-08-08 ENCOUNTER — OFFICE VISIT (OUTPATIENT)
Dept: PAIN MEDICINE | Facility: CLINIC | Age: 69
End: 2024-08-08
Payer: MEDICARE

## 2024-08-08 ENCOUNTER — TELEPHONE (OUTPATIENT)
Dept: PAIN MEDICINE | Facility: CLINIC | Age: 69
End: 2024-08-08

## 2024-08-08 VITALS
HEART RATE: 81 BPM | BODY MASS INDEX: 33.52 KG/M2 | DIASTOLIC BLOOD PRESSURE: 70 MMHG | OXYGEN SATURATION: 98 % | RESPIRATION RATE: 16 BRPM | WEIGHT: 189.2 LBS | SYSTOLIC BLOOD PRESSURE: 97 MMHG

## 2024-08-08 DIAGNOSIS — M51.37 DEGENERATION OF LUMBAR OR LUMBOSACRAL INTERVERTEBRAL DISC: ICD-10-CM

## 2024-08-08 DIAGNOSIS — Z79.899 HIGH RISK MEDICATION USE: Primary | ICD-10-CM

## 2024-08-08 DIAGNOSIS — M48.062 SPINAL STENOSIS OF LUMBAR REGION WITH NEUROGENIC CLAUDICATION: ICD-10-CM

## 2024-08-08 DIAGNOSIS — M50.30 DEGENERATIVE DISC DISEASE, CERVICAL: ICD-10-CM

## 2024-08-08 DIAGNOSIS — M25.561 CHRONIC PAIN OF BOTH KNEES: ICD-10-CM

## 2024-08-08 DIAGNOSIS — G89.29 CHRONIC BILATERAL LOW BACK PAIN WITH BILATERAL SCIATICA: Chronic | ICD-10-CM

## 2024-08-08 DIAGNOSIS — G89.29 CHRONIC BILATERAL THORACIC BACK PAIN: ICD-10-CM

## 2024-08-08 DIAGNOSIS — M48.04 SPINAL STENOSIS OF THORACIC REGION: ICD-10-CM

## 2024-08-08 DIAGNOSIS — M47.12 CERVICAL SPONDYLOSIS WITH MYELOPATHY: ICD-10-CM

## 2024-08-08 DIAGNOSIS — M54.6 CHRONIC BILATERAL THORACIC BACK PAIN: ICD-10-CM

## 2024-08-08 DIAGNOSIS — M25.562 CHRONIC PAIN OF BOTH KNEES: ICD-10-CM

## 2024-08-08 DIAGNOSIS — M47.16 LUMBAR SPONDYLOSIS WITH MYELOPATHY: ICD-10-CM

## 2024-08-08 DIAGNOSIS — G89.29 CHRONIC PAIN OF BOTH KNEES: ICD-10-CM

## 2024-08-08 DIAGNOSIS — M54.41 CHRONIC BILATERAL LOW BACK PAIN WITH BILATERAL SCIATICA: Chronic | ICD-10-CM

## 2024-08-08 DIAGNOSIS — M79.672 PAIN IN BOTH FEET: ICD-10-CM

## 2024-08-08 DIAGNOSIS — G25.81 RESTLESS LEG SYNDROME: ICD-10-CM

## 2024-08-08 DIAGNOSIS — M79.671 PAIN IN BOTH FEET: ICD-10-CM

## 2024-08-08 DIAGNOSIS — M54.42 CHRONIC BILATERAL LOW BACK PAIN WITH BILATERAL SCIATICA: Chronic | ICD-10-CM

## 2024-08-08 DIAGNOSIS — M54.2 CERVICALGIA: ICD-10-CM

## 2024-08-08 DIAGNOSIS — M48.02 SPINAL STENOSIS OF CERVICAL REGION: ICD-10-CM

## 2024-08-08 PROCEDURE — 1159F MED LIST DOCD IN RCRD: CPT | Performed by: PHYSICAL MEDICINE & REHABILITATION

## 2024-08-08 PROCEDURE — 3074F SYST BP LT 130 MM HG: CPT | Performed by: PHYSICAL MEDICINE & REHABILITATION

## 2024-08-08 PROCEDURE — 3078F DIAST BP <80 MM HG: CPT | Performed by: PHYSICAL MEDICINE & REHABILITATION

## 2024-08-08 PROCEDURE — 99204 OFFICE O/P NEW MOD 45 MIN: CPT | Performed by: PHYSICAL MEDICINE & REHABILITATION

## 2024-08-08 PROCEDURE — 1125F AMNT PAIN NOTED PAIN PRSNT: CPT | Performed by: PHYSICAL MEDICINE & REHABILITATION

## 2024-08-08 PROCEDURE — 1160F RVW MEDS BY RX/DR IN RCRD: CPT | Performed by: PHYSICAL MEDICINE & REHABILITATION

## 2024-08-08 RX ORDER — HYDROCODONE BITARTRATE AND ACETAMINOPHEN 5; 325 MG/1; MG/1
1 TABLET ORAL EVERY 6 HOURS PRN
Qty: 28 TABLET | Refills: 0 | Status: SHIPPED | OUTPATIENT
Start: 2024-08-08

## 2024-08-08 RX ORDER — TIZANIDINE 4 MG/1
4 TABLET ORAL EVERY 8 HOURS PRN
Qty: 90 TABLET | Refills: 1 | Status: SHIPPED | OUTPATIENT
Start: 2024-08-08

## 2024-08-08 NOTE — PROGRESS NOTES
Subjective   Johanna Swan is a 68 y.o. female.     History of Present Illness  Chronic pain in neck, thoracic and lumbar spine, b/l knees, 8/10 at worst, 3/10 at best, always present, varies, aching, sharp, worse with bending and lifting, interferes with ADLs, activity, failed PT, chiropractor, massage, acupuncture, meds. MRI shows DJD in C-, T-, L-spine with severe L3/4 stenosis. X-ray with severe b/l knee OA. Saw [facility], notes reviewed, good relief with cervical RFA, thoracic and lumbar MBB, ILESI, no red flags noted, taking Norco 5mg qdaily prn, Lyrica 150mg BID, Tizanidine 4mg TID prn, RxAlt #2 cream. Has strong FH of substance abuse.   Back Pain  Associated symptoms include abdominal pain. Pertinent negatives include no bladder incontinence, chest pain, fever, numbness or weakness.   Neck Pain   Associated symptoms include trouble swallowing. Pertinent negatives include no chest pain, fever, numbness or weakness.        The following portions of the patient's history were reviewed and updated as appropriate: allergies, current medications, past family history, past medical history, past social history, past surgical history, and problem list.    Review of Systems   Constitutional:  Positive for fatigue. Negative for chills and fever.   HENT:  Positive for hearing loss and trouble swallowing.    Eyes:  Negative for visual disturbance.   Respiratory:  Negative for shortness of breath.    Cardiovascular:  Negative for chest pain.   Gastrointestinal:  Positive for abdominal pain, constipation and diarrhea. Negative for nausea and vomiting.   Genitourinary:  Negative for urinary incontinence.   Musculoskeletal:  Positive for arthralgias, back pain and neck pain. Negative for joint swelling and myalgias.   Neurological:  Positive for headache. Negative for dizziness, weakness and numbness.       Objective   Physical Exam  Constitutional:       Appearance: Normal appearance. She is well-developed.   HENT:      Head:  Normocephalic and atraumatic.   Eyes:      Pupils: Pupils are equal, round, and reactive to light.   Cardiovascular:      Rate and Rhythm: Normal rate and regular rhythm.      Heart sounds: Normal heart sounds.   Pulmonary:      Effort: Pulmonary effort is normal.      Breath sounds: Normal breath sounds.   Abdominal:      General: Bowel sounds are normal. There is no distension.      Palpations: Abdomen is soft.      Tenderness: There is no abdominal tenderness.   Musculoskeletal:      Cervical back: Normal range of motion.   Neurological:      Mental Status: She is alert and oriented to person, place, and time.      Sensory: No sensory deficit.      Deep Tendon Reflexes: Reflexes are normal and symmetric. Reflexes normal.   Psychiatric:         Mood and Affect: Mood normal.         Behavior: Behavior normal.         Thought Content: Thought content normal.         Judgment: Judgment normal.           Assessment & Plan   Problems Addressed this Visit          Other    Chronic bilateral low back pain with bilateral sciatica - Primary (Chronic)    Relevant Medications    HYDROcodone-acetaminophen (NORCO) 5-325 MG per tablet    Lyrica 150 MG capsule    Spinal stenosis of cervical region    Degeneration of lumbar or lumbosacral intervertebral disc    Degenerative disc disease, cervical    Chronic pain of both knees    Restless leg syndrome    Cervicalgia    Thoracic back pain    Spinal stenosis of thoracic region    Cervical spondylosis with myelopathy    Lumbar spondylosis with myelopathy    Spinal stenosis of lumbar region with neurogenic claudication    Pain in both feet     Diagnoses         Codes Comments    Chronic bilateral low back pain with bilateral sciatica    -  Primary ICD-10-CM: M54.42, M54.41, G89.29  ICD-9-CM: 724.2, 724.3, 338.29     Cervicalgia     ICD-10-CM: M54.2  ICD-9-CM: 723.1     Cervical spondylosis with myelopathy     ICD-10-CM: M47.12  ICD-9-CM: 721.1     Chronic pain of both knees      ICD-10-CM: M25.561, M25.562, G89.29  ICD-9-CM: 719.46, 338.29     Degenerative disc disease, cervical     ICD-10-CM: M50.30  ICD-9-CM: 722.4     Degeneration of lumbar or lumbosacral intervertebral disc     ICD-10-CM: M51.37  ICD-9-CM: 722.52     Lumbar spondylosis with myelopathy     ICD-10-CM: M47.16  ICD-9-CM: 721.42     Pain in both feet     ICD-10-CM: M79.671, M79.672  ICD-9-CM: 729.5     Restless leg syndrome     ICD-10-CM: G25.81  ICD-9-CM: 333.94     Spinal stenosis of cervical region     ICD-10-CM: M48.02  ICD-9-CM: 723.0     Spinal stenosis of lumbar region with neurogenic claudication     ICD-10-CM: M48.062  ICD-9-CM: 724.03     Spinal stenosis of thoracic region     ICD-10-CM: M48.04  ICD-9-CM: 724.01     Chronic bilateral thoracic back pain     ICD-10-CM: M54.6, G89.29  ICD-9-CM: 724.1, 338.29             Discussed risks and benefits of opioid treatment for chonic pain with patient, including expectations related to prescription requests, alternative modalities to opioids for managing pain, her treatment plan, risks of dependency and addiction, and safe storage practices for prescribed opioids, as well as proper and improper disposal of all medications.  Will obtain UDS today, pain contract today.  Treatment plan will consist of continuing current medication as long as it remains effective and is necessary, while evaluating patient at each visit and determining if the medication can be lowered or discontinued, while also using nonopioid therapies to reduce reliance on opioids.  Begin Norco 5mg QID prn this month, then qdaily prn. Last filled   Cont Lyrica 150mg BID  Cont Tizanidine 4mg TID prn  Cont RxAlt #2 cream  Plan cervical RFA when we receive records from [facility] showing specific levels.  RTC in 1 month for f/u, schedule procedures.    INSPECT REPORT     As part of the patient's treatment plan, I am prescribing controlled substances. The patient has been made aware of appropriate use of such  medications, including potential risk of somnolence, limited ability to drive and/or work safely, and the potential for dependence or overdose. It has also bee made clear that these medications are for use by this patient only, without concomitant use of alcohol or other substances unless prescribed.      Patient has completed prescribing agreement detailing terms of continued prescribing of controlled substances, including monitoring INSPECT reports, urine drug screening, and pill counts if necessary. The patient is aware that inappropriate use will results in cessation of prescribing such medications.     INSPECT report has been reviewed and scanned into the patient's chart.     As the clinician, I personally reviewed the INSPECT while the patient was in the office today.     History and physical exam exhibit continued safe and appropriate use of controlled substances.

## 2024-08-16 ENCOUNTER — HOSPITAL ENCOUNTER (OUTPATIENT)
Dept: MAMMOGRAPHY | Facility: HOSPITAL | Age: 69
Discharge: HOME | End: 2024-08-16
Payer: MEDICARE

## 2024-08-16 ENCOUNTER — HOSPITAL ENCOUNTER (OUTPATIENT)
Dept: BONE DENSITY | Facility: HOSPITAL | Age: 69
Discharge: HOME | End: 2024-08-16
Payer: MEDICARE

## 2024-08-16 DIAGNOSIS — Z12.31 ENCOUNTER FOR SCREENING MAMMOGRAM FOR BREAST CANCER: ICD-10-CM

## 2024-08-16 DIAGNOSIS — Z78.0 MENOPAUSE: ICD-10-CM

## 2024-08-16 PROCEDURE — 77067 SCR MAMMO BI INCL CAD: CPT

## 2024-08-16 PROCEDURE — 77063 BREAST TOMOSYNTHESIS BI: CPT

## 2024-08-16 PROCEDURE — 77080 DXA BONE DENSITY AXIAL: CPT

## 2024-08-17 NOTE — PROGRESS NOTES
I have sent the following message to patient through [address]:  Dennis,  Your bone scan again shows osteopenia. T score at femur is -1.3 this is a slight progression from -1.0, 2 years ago and -0.4 in 2018 this still does not need criteria to recommend bisphosphonate therapy.  You do need to be getting a total of 1200 to 1500 mg of calcium through diet and supplements as well as 600 to 800 IUs of vitamin D on a daily basis. Should also be doing weight bearing exercises (anything other than swimming) to help improve bone density. Should repeat in 2 years to watch for continued progression.  Spring Silverio MD

## 2024-08-19 ENCOUNTER — OFFICE (AMBULATORY)
Age: 69
End: 2024-08-19
Payer: MEDICAID

## 2024-08-19 ENCOUNTER — OFFICE (AMBULATORY)
Dept: URBAN - METROPOLITAN AREA CLINIC 64 | Facility: CLINIC | Age: 69
End: 2024-08-19
Payer: MEDICAID

## 2024-08-19 VITALS
HEART RATE: 79 BPM | DIASTOLIC BLOOD PRESSURE: 64 MMHG | WEIGHT: 188 LBS | WEIGHT: 188 LBS | HEART RATE: 79 BPM | SYSTOLIC BLOOD PRESSURE: 111 MMHG | HEIGHT: 63 IN | HEART RATE: 79 BPM | WEIGHT: 188 LBS | HEIGHT: 63 IN | SYSTOLIC BLOOD PRESSURE: 111 MMHG | HEIGHT: 63 IN | HEART RATE: 79 BPM | DIASTOLIC BLOOD PRESSURE: 64 MMHG | DIASTOLIC BLOOD PRESSURE: 64 MMHG | SYSTOLIC BLOOD PRESSURE: 111 MMHG | SYSTOLIC BLOOD PRESSURE: 111 MMHG | DIASTOLIC BLOOD PRESSURE: 64 MMHG | HEIGHT: 63 IN | DIASTOLIC BLOOD PRESSURE: 64 MMHG | SYSTOLIC BLOOD PRESSURE: 111 MMHG | SYSTOLIC BLOOD PRESSURE: 111 MMHG | HEIGHT: 63 IN | HEART RATE: 79 BPM | WEIGHT: 188 LBS | WEIGHT: 188 LBS | HEIGHT: 63 IN | DIASTOLIC BLOOD PRESSURE: 64 MMHG | WEIGHT: 188 LBS | SYSTOLIC BLOOD PRESSURE: 111 MMHG | HEIGHT: 63 IN | WEIGHT: 188 LBS | HEART RATE: 79 BPM | DIASTOLIC BLOOD PRESSURE: 64 MMHG | HEART RATE: 79 BPM

## 2024-08-19 DIAGNOSIS — R13.10 DYSPHAGIA, UNSPECIFIED: ICD-10-CM

## 2024-08-19 DIAGNOSIS — R14.0 ABDOMINAL DISTENSION (GASEOUS): ICD-10-CM

## 2024-08-19 DIAGNOSIS — K21.9 GASTRO-ESOPHAGEAL REFLUX DISEASE WITHOUT ESOPHAGITIS: ICD-10-CM

## 2024-08-19 DIAGNOSIS — K59.00 CONSTIPATION, UNSPECIFIED: ICD-10-CM

## 2024-08-19 PROCEDURE — 99214 OFFICE O/P EST MOD 30 MIN: CPT | Performed by: INTERNAL MEDICINE

## 2024-08-22 DIAGNOSIS — E11.9 TYPE 2 DIABETES MELLITUS WITHOUT COMPLICATION, WITHOUT LONG-TERM CURRENT USE OF INSULIN: ICD-10-CM

## 2024-08-22 RX ORDER — TIRZEPATIDE 7.5 MG/.5ML
INJECTION, SOLUTION SUBCUTANEOUS
Qty: 2 ML | Refills: 2 | Status: SHIPPED | OUTPATIENT
Start: 2024-08-22

## 2024-08-23 ENCOUNTER — HOSPITAL ENCOUNTER (OUTPATIENT)
Dept: MRI IMAGING | Facility: HOSPITAL | Age: 69
Discharge: HOME | End: 2024-08-23
Payer: MEDICARE

## 2024-08-23 DIAGNOSIS — G89.29 CHRONIC BILATERAL LOW BACK PAIN WITH BILATERAL SCIATICA: ICD-10-CM

## 2024-08-23 DIAGNOSIS — M48.062 SPINAL STENOSIS OF LUMBAR REGION WITH NEUROGENIC CLAUDICATION: ICD-10-CM

## 2024-08-23 DIAGNOSIS — M47.16 LUMBAR SPONDYLOSIS WITH MYELOPATHY: ICD-10-CM

## 2024-08-23 DIAGNOSIS — M54.41 CHRONIC BILATERAL LOW BACK PAIN WITH BILATERAL SCIATICA: ICD-10-CM

## 2024-08-23 DIAGNOSIS — M51.37 DEGENERATION OF LUMBAR OR LUMBOSACRAL INTERVERTEBRAL DISC: ICD-10-CM

## 2024-08-23 DIAGNOSIS — M54.42 CHRONIC BILATERAL LOW BACK PAIN WITH BILATERAL SCIATICA: ICD-10-CM

## 2024-08-23 PROCEDURE — 72148 MRI LUMBAR SPINE W/O DYE: CPT

## 2024-08-26 NOTE — PROGRESS NOTES
I have sent the following message to patient through [employer]:  Dennis,   MRI of lumbar spine does show degenerative changes of the lumbar spine.  There is moderate canal stenosis at L3-L4.  Keep follow-up with Dr. Khan as scheduled on September 5.  Spring Silverio MD

## 2024-09-05 ENCOUNTER — OFFICE VISIT (OUTPATIENT)
Dept: PAIN MEDICINE | Facility: CLINIC | Age: 69
End: 2024-09-05
Payer: MEDICARE

## 2024-09-05 VITALS
BODY MASS INDEX: 33.3 KG/M2 | OXYGEN SATURATION: 99 % | DIASTOLIC BLOOD PRESSURE: 75 MMHG | RESPIRATION RATE: 16 BRPM | WEIGHT: 188 LBS | SYSTOLIC BLOOD PRESSURE: 101 MMHG | HEART RATE: 88 BPM

## 2024-09-05 DIAGNOSIS — M54.42 CHRONIC BILATERAL LOW BACK PAIN WITH BILATERAL SCIATICA: Primary | Chronic | ICD-10-CM

## 2024-09-05 DIAGNOSIS — G89.29 CHRONIC PAIN OF BOTH KNEES: ICD-10-CM

## 2024-09-05 DIAGNOSIS — M48.04 SPINAL STENOSIS OF THORACIC REGION: ICD-10-CM

## 2024-09-05 DIAGNOSIS — G43.809 OTHER MIGRAINE WITHOUT STATUS MIGRAINOSUS, NOT INTRACTABLE: ICD-10-CM

## 2024-09-05 DIAGNOSIS — M54.6 CHRONIC BILATERAL THORACIC BACK PAIN: ICD-10-CM

## 2024-09-05 DIAGNOSIS — M51.37 DEGENERATION OF LUMBAR OR LUMBOSACRAL INTERVERTEBRAL DISC: ICD-10-CM

## 2024-09-05 DIAGNOSIS — M54.2 CERVICALGIA: ICD-10-CM

## 2024-09-05 DIAGNOSIS — M50.20 HERNIATED CERVICAL DISC: ICD-10-CM

## 2024-09-05 DIAGNOSIS — G89.29 CHRONIC BILATERAL THORACIC BACK PAIN: ICD-10-CM

## 2024-09-05 DIAGNOSIS — M48.062 SPINAL STENOSIS OF LUMBAR REGION WITH NEUROGENIC CLAUDICATION: ICD-10-CM

## 2024-09-05 DIAGNOSIS — M79.671 PAIN IN BOTH FEET: ICD-10-CM

## 2024-09-05 DIAGNOSIS — M54.41 CHRONIC BILATERAL LOW BACK PAIN WITH BILATERAL SCIATICA: Primary | Chronic | ICD-10-CM

## 2024-09-05 DIAGNOSIS — M47.12 CERVICAL SPONDYLOSIS WITH MYELOPATHY: ICD-10-CM

## 2024-09-05 DIAGNOSIS — G89.29 CHRONIC BILATERAL LOW BACK PAIN WITH BILATERAL SCIATICA: Primary | Chronic | ICD-10-CM

## 2024-09-05 DIAGNOSIS — M25.562 CHRONIC PAIN OF BOTH KNEES: ICD-10-CM

## 2024-09-05 DIAGNOSIS — M79.672 PAIN IN BOTH FEET: ICD-10-CM

## 2024-09-05 DIAGNOSIS — G25.81 RESTLESS LEG SYNDROME: ICD-10-CM

## 2024-09-05 DIAGNOSIS — M47.16 LUMBAR SPONDYLOSIS WITH MYELOPATHY: ICD-10-CM

## 2024-09-05 DIAGNOSIS — M25.561 CHRONIC PAIN OF BOTH KNEES: ICD-10-CM

## 2024-09-05 DIAGNOSIS — M50.30 DEGENERATIVE DISC DISEASE, CERVICAL: ICD-10-CM

## 2024-09-05 DIAGNOSIS — M48.02 SPINAL STENOSIS OF CERVICAL REGION: ICD-10-CM

## 2024-09-05 PROCEDURE — 1125F AMNT PAIN NOTED PAIN PRSNT: CPT | Performed by: PHYSICAL MEDICINE & REHABILITATION

## 2024-09-05 PROCEDURE — 3074F SYST BP LT 130 MM HG: CPT | Performed by: PHYSICAL MEDICINE & REHABILITATION

## 2024-09-05 PROCEDURE — 3078F DIAST BP <80 MM HG: CPT | Performed by: PHYSICAL MEDICINE & REHABILITATION

## 2024-09-05 PROCEDURE — 99214 OFFICE O/P EST MOD 30 MIN: CPT | Performed by: PHYSICAL MEDICINE & REHABILITATION

## 2024-09-05 PROCEDURE — 1160F RVW MEDS BY RX/DR IN RCRD: CPT | Performed by: PHYSICAL MEDICINE & REHABILITATION

## 2024-09-05 PROCEDURE — 1159F MED LIST DOCD IN RCRD: CPT | Performed by: PHYSICAL MEDICINE & REHABILITATION

## 2024-09-05 RX ORDER — HYDROCODONE BITARTRATE AND ACETAMINOPHEN 5; 325 MG/1; MG/1
1 TABLET ORAL DAILY PRN
Qty: 30 TABLET | Refills: 0 | Status: SHIPPED | OUTPATIENT
Start: 2024-09-05

## 2024-09-05 RX ORDER — PREGABALIN 150 MG/1
150 CAPSULE ORAL 2 TIMES DAILY
Qty: 60 CAPSULE | Refills: 2 | Status: SHIPPED | OUTPATIENT
Start: 2024-09-05

## 2024-09-05 NOTE — PROGRESS NOTES
Subjective   Johanna Swan is a 68 y.o. female.     History of Present Illness  Chronic pain in neck, thoracic and lumbar spine, b/l knees, 8/10 at worst, 3/10 at best, always present, varies, aching, sharp, worse with bending and lifting, interferes with ADLs, activity, failed PT, chiropractor, massage, acupuncture, meds. MRI shows DJD in C-, T-, L-spine with severe L3/4 stenosis. X-ray with severe b/l knee OA. Saw [facility], notes reviewed, good relief with cervical RFA, thoracic and lumbar MBB, ILESI, no red flags noted, taking Norco 5mg qdaily prn, Lyrica 150mg BID, Tizanidine 4mg TID prn, RxAlt #2 cream. Has strong FH of substance abuse. LBP > neck pain.   Back Pain  Associated symptoms include abdominal pain. Pertinent negatives include no bladder incontinence, chest pain, fever, numbness or weakness.   Neck Pain   Associated symptoms include trouble swallowing. Pertinent negatives include no chest pain, fever, numbness or weakness.        The following portions of the patient's history were reviewed and updated as appropriate: allergies, current medications, past family history, past medical history, past social history, past surgical history, and problem list.    Review of Systems   Constitutional:  Positive for fatigue. Negative for chills and fever.   HENT:  Positive for hearing loss and trouble swallowing.    Eyes:  Negative for visual disturbance.   Respiratory:  Negative for shortness of breath.    Cardiovascular:  Negative for chest pain.   Gastrointestinal:  Positive for abdominal pain, constipation and diarrhea. Negative for nausea and vomiting.   Genitourinary:  Negative for urinary incontinence.   Musculoskeletal:  Positive for arthralgias, back pain and neck pain. Negative for joint swelling and myalgias.   Neurological:  Positive for headache. Negative for dizziness, weakness and numbness.       Objective   Physical Exam  Constitutional:       Appearance: Normal appearance. She is well-developed.    HENT:      Head: Normocephalic and atraumatic.   Eyes:      Pupils: Pupils are equal, round, and reactive to light.   Cardiovascular:      Rate and Rhythm: Normal rate and regular rhythm.      Heart sounds: Normal heart sounds.   Pulmonary:      Effort: Pulmonary effort is normal.      Breath sounds: Normal breath sounds.   Abdominal:      General: Bowel sounds are normal. There is no distension.      Palpations: Abdomen is soft.      Tenderness: There is no abdominal tenderness.   Musculoskeletal:      Cervical back: Normal range of motion.   Neurological:      Mental Status: She is alert and oriented to person, place, and time.      Sensory: No sensory deficit.      Deep Tendon Reflexes: Reflexes are normal and symmetric. Reflexes normal.   Psychiatric:         Mood and Affect: Mood normal.         Behavior: Behavior normal.         Thought Content: Thought content normal.         Judgment: Judgment normal.         Assessment & Plan   Problems Addressed this Visit          Other    Chronic bilateral low back pain with bilateral sciatica - Primary (Chronic)    Spinal stenosis of cervical region    Degeneration of lumbar or lumbosacral intervertebral disc    Degenerative disc disease, cervical    Herniated cervical disc    Chronic pain of both knees    Restless leg syndrome    Cervicalgia    Thoracic back pain    Migraine    Spinal stenosis of thoracic region    Cervical spondylosis with myelopathy    Lumbar spondylosis with myelopathy    Spinal stenosis of lumbar region with neurogenic claudication    Pain in both feet     Diagnoses         Codes Comments    Chronic bilateral low back pain with bilateral sciatica    -  Primary ICD-10-CM: M54.42, M54.41, G89.29  ICD-9-CM: 724.2, 724.3, 338.29     Cervicalgia     ICD-10-CM: M54.2  ICD-9-CM: 723.1     Cervical spondylosis with myelopathy     ICD-10-CM: M47.12  ICD-9-CM: 721.1     Chronic pain of both knees     ICD-10-CM: M25.561, M25.562, G89.29  ICD-9-CM: 719.46,  338.29     Degeneration of lumbar or lumbosacral intervertebral disc     ICD-10-CM: M51.37  ICD-9-CM: 722.52     Degenerative disc disease, cervical     ICD-10-CM: M50.30  ICD-9-CM: 722.4     Herniated cervical disc     ICD-10-CM: M50.20  ICD-9-CM: 722.0     Lumbar spondylosis with myelopathy     ICD-10-CM: M47.16  ICD-9-CM: 721.42     Other migraine without status migrainosus, not intractable     ICD-10-CM: G43.809  ICD-9-CM: 346.80     Pain in both feet     ICD-10-CM: M79.671, M79.672  ICD-9-CM: 729.5     Restless leg syndrome     ICD-10-CM: G25.81  ICD-9-CM: 333.94     Spinal stenosis of cervical region     ICD-10-CM: M48.02  ICD-9-CM: 723.0     Spinal stenosis of lumbar region with neurogenic claudication     ICD-10-CM: M48.062  ICD-9-CM: 724.03     Spinal stenosis of thoracic region     ICD-10-CM: M48.04  ICD-9-CM: 724.01     Chronic bilateral thoracic back pain     ICD-10-CM: M54.6, G89.29  ICD-9-CM: 724.1, 338.29             UDS in order 8/8/24  Discussed risks and benefits of opioid treatment for chonic pain with patient, including expectations related to prescription requests, alternative modalities to opioids for managing pain, her treatment plan, risks of dependency and addiction, and safe storage practices for prescribed opioids, as well as proper and improper disposal of all medications.  Treatment plan will consist of continuing current medication as long as it remains effective and is necessary, while evaluating patient at each visit and determining if the medication can be lowered or discontinued, while also using nonopioid therapies to reduce reliance on opioids.  Began Norco 5mg QID prn this month, then qdaily prn, has FH of substance abuse. Last filled 8/8/24.  Patient's symptoms are still adequately managed by current medication regimen, is doing well at this strength and dosage, therefore I will continue to prescribe unchanged as the most appropriate course of treatment.  Cont Lyrica 150mg  BID  Cont Tizanidine 4mg TID prn  Cont RxAlt #2 cream  Plan cervical RFA when we receive records from [facility] showing specific levels.  Schedule 2 b/l L3-5 MBB for RFA.  RTC for procedures.    INSPECT REPORT     As part of the patient's treatment plan, I am prescribing controlled substances. The patient has been made aware of appropriate use of such medications, including potential risk of somnolence, limited ability to drive and/or work safely, and the potential for dependence or overdose. It has also bee made clear that these medications are for use by this patient only, without concomitant use of alcohol or other substances unless prescribed.      Patient has completed prescribing agreement detailing terms of continued prescribing of controlled substances, including monitoring INSPECT reports, urine drug screening, and pill counts if necessary. The patient is aware that inappropriate use will results in cessation of prescribing such medications.     INSPECT report has been reviewed and scanned into the patient's chart.     As the clinician, I personally reviewed the INSPECT while the patient was in the office today.     History and physical exam exhibit continued safe and appropriate use of controlled substances.

## 2024-09-10 ENCOUNTER — OFFICE VISIT (OUTPATIENT)
Dept: ORTHOPEDIC SURGERY | Facility: CLINIC | Age: 69
End: 2024-09-10
Payer: MEDICARE

## 2024-09-10 VITALS — OXYGEN SATURATION: 94 % | HEART RATE: 91 BPM | HEIGHT: 63 IN | WEIGHT: 188 LBS | BODY MASS INDEX: 33.31 KG/M2

## 2024-09-10 DIAGNOSIS — M17.12 PRIMARY OSTEOARTHRITIS OF LEFT KNEE: ICD-10-CM

## 2024-09-10 DIAGNOSIS — M17.11 PRIMARY OSTEOARTHRITIS OF RIGHT KNEE: ICD-10-CM

## 2024-09-10 PROCEDURE — 20610 DRAIN/INJ JOINT/BURSA W/O US: CPT | Performed by: PHYSICIAN ASSISTANT

## 2024-09-10 RX ORDER — HYALURONATE SODIUM 10 MG/ML
20 SYRINGE (ML) INTRAARTICULAR
Status: COMPLETED | OUTPATIENT
Start: 2024-09-10 | End: 2024-09-10

## 2024-09-10 RX ADMIN — Medication 20 MG: at 13:11

## 2024-09-10 RX ADMIN — Medication 20 MG: at 13:22

## 2024-09-10 NOTE — PROGRESS NOTES
"   Patient ID: Johanna Swan is a 68 y.o. female presents with her , Angel, for bilateral knee Euflexxa HA injections. She looks forward to the benefit these injections will provide with her daily activities and exercise on [place].     Objective:  Pulse 91   Ht 160 cm (63\")   Wt 85.3 kg (188 lb)   LMP  (LMP Unknown)   SpO2 94%   BMI 33.30 kg/m²     Physical Examination:    Ambulating with an antalgic gait    Bilateral knee send demonstrate intact skin.  No warmth.  Retropatellar crepitus and range of motion grossly intact at the ankle and hand hip.    Imaging:   No new imaging.    Assessment:    Diagnoses and all orders for this visit:    1. Primary osteoarthritis of left knee  -     Visco Treatment    2. Primary osteoarthritis of right knee  -     Visco Treatment    Other orders  -     Large Joint Arthrocentesis  -     Large Joint Arthrocentesis    Plan: Restrictions discussed.  May resume activity as tolerated on Thursday morning.  Follow-up in 1 week for injection #2.    Large Joint Arthrocentesis: R knee  Date/Time: 9/10/2024 1:11 PM  Consent given by: patient  Site marked: site marked  Timeout: Immediately prior to procedure a time out was called to verify the correct patient, procedure, equipment, support staff and site/side marked as required   Supporting Documentation  Indications: pain   Procedure Details  Location: knee - R knee  Preparation: Patient was prepped and draped in the usual sterile fashion  Needle size: 22 G  Approach: anterolateral  Medications administered: 20 mg Sodium Hyaluronate (Viscosup) 20 MG/2ML  Patient tolerance: patient tolerated the procedure well with no immediate complications      Large Joint Arthrocentesis: L knee  Date/Time: 9/10/2024 1:22 PM  Consent given by: patient  Site marked: site marked  Timeout: Immediately prior to procedure a time out was called to verify the correct patient, procedure, equipment, support staff and site/side marked as " required   Supporting Documentation  Indications: pain   Procedure Details  Location: knee - L knee  Preparation: Patient was prepped and draped in the usual sterile fashion  Needle size: 22 G  Approach: anterolateral  Medications administered: 20 mg Sodium Hyaluronate (Viscosup) 20 MG/2ML  Patient tolerance: patient tolerated the procedure well with no immediate complications        BMI is >= 30 and <35. (Class 1 Obesity). The following options were offered after discussion;: exercise counseling/recommendations and nutrition counseling/recommendations     Disclaimer: Part of this note may be an electronic transcription/translation of spoken language to printed text using the Dragon Dictation System

## 2024-09-16 ENCOUNTER — TELEPHONE (OUTPATIENT)
Dept: PAIN MEDICINE | Facility: CLINIC | Age: 69
End: 2024-09-16
Payer: MEDICARE

## 2024-09-16 ENCOUNTER — OFFICE VISIT (OUTPATIENT)
Dept: FAMILY MEDICINE CLINIC | Facility: CLINIC | Age: 69
End: 2024-09-16
Payer: MEDICARE

## 2024-09-16 VITALS
WEIGHT: 188.25 LBS | BODY MASS INDEX: 33.36 KG/M2 | TEMPERATURE: 97.5 F | RESPIRATION RATE: 18 BRPM | DIASTOLIC BLOOD PRESSURE: 74 MMHG | HEIGHT: 63 IN | HEART RATE: 106 BPM | OXYGEN SATURATION: 96 % | SYSTOLIC BLOOD PRESSURE: 126 MMHG

## 2024-09-16 DIAGNOSIS — R05.1 ACUTE COUGH: Primary | ICD-10-CM

## 2024-09-16 DIAGNOSIS — B96.89 ACUTE BACTERIAL SINUSITIS: ICD-10-CM

## 2024-09-16 DIAGNOSIS — R09.82 POSTNASAL DRIP: ICD-10-CM

## 2024-09-16 DIAGNOSIS — J01.90 ACUTE BACTERIAL SINUSITIS: ICD-10-CM

## 2024-09-16 LAB
EXPIRATION DATE: NORMAL
FLUAV AG UPPER RESP QL IA.RAPID: NOT DETECTED
FLUBV AG UPPER RESP QL IA.RAPID: NOT DETECTED
INTERNAL CONTROL: NORMAL
Lab: NORMAL
SARS-COV-2 AG UPPER RESP QL IA.RAPID: NOT DETECTED

## 2024-09-16 PROCEDURE — 1126F AMNT PAIN NOTED NONE PRSNT: CPT

## 2024-09-16 PROCEDURE — 99214 OFFICE O/P EST MOD 30 MIN: CPT

## 2024-09-16 PROCEDURE — 3078F DIAST BP <80 MM HG: CPT

## 2024-09-16 PROCEDURE — 3044F HG A1C LEVEL LT 7.0%: CPT

## 2024-09-16 PROCEDURE — 87428 SARSCOV & INF VIR A&B AG IA: CPT

## 2024-09-16 PROCEDURE — 3074F SYST BP LT 130 MM HG: CPT

## 2024-09-16 RX ORDER — IPRATROPIUM BROMIDE 21 UG/1
2 SPRAY, METERED NASAL EVERY 12 HOURS
Qty: 20 ML | Refills: 0 | Status: SHIPPED | OUTPATIENT
Start: 2024-09-16 | End: 2024-09-21

## 2024-09-23 ENCOUNTER — OFFICE VISIT (OUTPATIENT)
Dept: ORTHOPEDIC SURGERY | Facility: CLINIC | Age: 69
End: 2024-09-23
Payer: MEDICARE

## 2024-09-23 VITALS
WEIGHT: 188 LBS | BODY MASS INDEX: 33.31 KG/M2 | HEART RATE: 94 BPM | OXYGEN SATURATION: 96 % | HEIGHT: 63 IN | RESPIRATION RATE: 20 BRPM

## 2024-09-23 DIAGNOSIS — M17.12 PRIMARY OSTEOARTHRITIS OF LEFT KNEE: Primary | ICD-10-CM

## 2024-09-23 DIAGNOSIS — M17.11 PRIMARY OSTEOARTHRITIS OF RIGHT KNEE: ICD-10-CM

## 2024-09-23 PROCEDURE — 20610 DRAIN/INJ JOINT/BURSA W/O US: CPT | Performed by: PHYSICIAN ASSISTANT

## 2024-09-23 PROCEDURE — 1160F RVW MEDS BY RX/DR IN RCRD: CPT | Performed by: PHYSICIAN ASSISTANT

## 2024-09-23 PROCEDURE — 1159F MED LIST DOCD IN RCRD: CPT | Performed by: PHYSICIAN ASSISTANT

## 2024-09-23 RX ADMIN — Medication 20 MG: at 15:03

## 2024-09-23 RX ADMIN — Medication 20 MG: at 15:09

## 2024-09-24 ENCOUNTER — TELEPHONE (OUTPATIENT)
Dept: PAIN MEDICINE | Facility: CLINIC | Age: 69
End: 2024-09-24
Payer: MEDICARE

## 2024-09-24 RX ORDER — HYALURONATE SODIUM 10 MG/ML
20 SYRINGE (ML) INTRAARTICULAR
Status: COMPLETED | OUTPATIENT
Start: 2024-09-23 | End: 2024-09-23

## 2024-09-30 ENCOUNTER — OFFICE VISIT (OUTPATIENT)
Dept: ORTHOPEDIC SURGERY | Facility: CLINIC | Age: 69
End: 2024-09-30
Payer: MEDICARE

## 2024-09-30 VITALS
BODY MASS INDEX: 33.31 KG/M2 | OXYGEN SATURATION: 96 % | WEIGHT: 188 LBS | HEART RATE: 100 BPM | RESPIRATION RATE: 20 BRPM | HEIGHT: 63 IN

## 2024-09-30 DIAGNOSIS — M17.11 PRIMARY OSTEOARTHRITIS OF RIGHT KNEE: ICD-10-CM

## 2024-09-30 DIAGNOSIS — M17.12 PRIMARY OSTEOARTHRITIS OF LEFT KNEE: Primary | ICD-10-CM

## 2024-09-30 DIAGNOSIS — R00.2 PALPITATIONS: ICD-10-CM

## 2024-09-30 DIAGNOSIS — I10 PRIMARY HYPERTENSION: ICD-10-CM

## 2024-09-30 PROCEDURE — 1159F MED LIST DOCD IN RCRD: CPT | Performed by: PHYSICIAN ASSISTANT

## 2024-09-30 PROCEDURE — 1160F RVW MEDS BY RX/DR IN RCRD: CPT | Performed by: PHYSICIAN ASSISTANT

## 2024-09-30 PROCEDURE — 20610 DRAIN/INJ JOINT/BURSA W/O US: CPT | Performed by: PHYSICIAN ASSISTANT

## 2024-09-30 RX ORDER — METOPROLOL SUCCINATE 25 MG/1
25 TABLET, EXTENDED RELEASE ORAL DAILY
Qty: 90 TABLET | Refills: 0 | Status: SHIPPED | OUTPATIENT
Start: 2024-09-30

## 2024-09-30 RX ADMIN — Medication 20 MG: at 13:15

## 2024-09-30 NOTE — PROGRESS NOTES
"   Patient ID: Johanna Swan is a 68 y.o. female presents with her , and her 2 grandchildren for her #3 final Euflexxa HA injection to bilateral knees.  Reports benefit from the previous 2 injections and is looking forward to the final result from the last injection.    Objective:  Pulse 100   Resp 20   Ht 160 cm (63\")   Wt 85.3 kg (188 lb)   LMP  (LMP Unknown)   SpO2 96%   BMI 33.30 kg/m²     Physical Examination:    Bilateral knees demonstrate intact skin.  No apparent effusion nor signs of infection.  Range of motion grossly 0-95+  Range of motion of the ankle and hip grossly intact  Negative calf tenderness bilaterally    Imaging:     Assessment:    Diagnoses and all orders for this visit:    1. Primary osteoarthritis of left knee (Primary)    2. Primary osteoarthritis of right knee    Other orders  -     Large Joint Arthrocentesis  -     Large Joint Arthrocentesis     Plan: Euflexxa HA injection provided to the intra-articular space of the left and right knee today.  Restrictions discussed.  May resume activity as tolerated Wednesday afternoon.  Follow-up in 3 months for further evaluation of efficacy.       Large Joint Arthrocentesis: R knee  Date/Time: 9/30/2024 1:15 PM  Consent given by: patient  Site marked: site marked  Timeout: Immediately prior to procedure a time out was called to verify the correct patient, procedure, equipment, support staff and site/side marked as required   Supporting Documentation  Indications: pain   Procedure Details  Location: knee - R knee  Preparation: Patient was prepped and draped in the usual sterile fashion  Needle size: 22 G  Approach: anterolateral  Medications administered: 20 mg Sodium Hyaluronate (Viscosup) 20 MG/2ML  Patient tolerance: patient tolerated the procedure well with no immediate complications      Large Joint Arthrocentesis: L knee  Date/Time: 9/30/2024 1:15 PM  Consent given by: patient  Site marked: site marked  Timeout: Immediately " prior to procedure a time out was called to verify the correct patient, procedure, equipment, support staff and site/side marked as required   Supporting Documentation  Indications: pain   Procedure Details  Location: knee - L knee  Preparation: Patient was prepped and draped in the usual sterile fashion  Needle size: 22 G  Approach: anterolateral  Medications administered: 20 mg Sodium Hyaluronate (Viscosup) 20 MG/2ML  Patient tolerance: patient tolerated the procedure well with no immediate complications      Disclaimer: Part of this note may be an electronic transcription/translation of spoken language to printed text using the Dragon Dictation System

## 2024-10-01 ENCOUNTER — HOSPITAL ENCOUNTER (OUTPATIENT)
Dept: PAIN MEDICINE | Facility: HOSPITAL | Age: 69
Discharge: HOME | End: 2024-10-01
Payer: MEDICARE

## 2024-10-01 VITALS
OXYGEN SATURATION: 99 % | HEART RATE: 88 BPM | RESPIRATION RATE: 16 BRPM | DIASTOLIC BLOOD PRESSURE: 70 MMHG | WEIGHT: 188 LBS | SYSTOLIC BLOOD PRESSURE: 115 MMHG | BODY MASS INDEX: 33.31 KG/M2 | TEMPERATURE: 97.1 F | HEIGHT: 63 IN

## 2024-10-01 DIAGNOSIS — M54.42 CHRONIC BILATERAL LOW BACK PAIN WITH BILATERAL SCIATICA: Primary | Chronic | ICD-10-CM

## 2024-10-01 DIAGNOSIS — R52 PAIN: ICD-10-CM

## 2024-10-01 DIAGNOSIS — G89.29 CHRONIC BILATERAL LOW BACK PAIN WITH BILATERAL SCIATICA: Primary | Chronic | ICD-10-CM

## 2024-10-01 DIAGNOSIS — M47.16 LUMBAR SPONDYLOSIS WITH MYELOPATHY: ICD-10-CM

## 2024-10-01 DIAGNOSIS — M54.41 CHRONIC BILATERAL LOW BACK PAIN WITH BILATERAL SCIATICA: Primary | Chronic | ICD-10-CM

## 2024-10-01 PROCEDURE — 77003 FLUOROGUIDE FOR SPINE INJECT: CPT

## 2024-10-01 PROCEDURE — 64493 INJ PARAVERT F JNT L/S 1 LEV: CPT | Performed by: PHYSICAL MEDICINE & REHABILITATION

## 2024-10-01 PROCEDURE — 25010000002 BUPIVACAINE (PF) 0.25 % SOLUTION: Performed by: PHYSICAL MEDICINE & REHABILITATION

## 2024-10-01 PROCEDURE — 64494 INJ PARAVERT F JNT L/S 2 LEV: CPT | Performed by: PHYSICAL MEDICINE & REHABILITATION

## 2024-10-01 RX ORDER — HYALURONATE SODIUM 10 MG/ML
20 SYRINGE (ML) INTRAARTICULAR
Status: COMPLETED | OUTPATIENT
Start: 2024-09-30 | End: 2024-09-30

## 2024-10-01 RX ORDER — BUPIVACAINE HYDROCHLORIDE 2.5 MG/ML
5 INJECTION, SOLUTION EPIDURAL; INFILTRATION; INTRACAUDAL ONCE
Status: COMPLETED | OUTPATIENT
Start: 2024-10-01 | End: 2024-10-01

## 2024-10-01 RX ADMIN — BUPIVACAINE HYDROCHLORIDE 5 ML: 2.5 INJECTION, SOLUTION EPIDURAL; INFILTRATION; INTRACAUDAL; PERINEURAL at 09:27

## 2024-10-01 NOTE — PROCEDURES
Procedures    Lumbar Medial Branch Block    PREOPERATIVE DIAGNOSIS: Lumbar spondylosis    POSTOPERATIVE DIAGNOSIS: Lumbar spondylosis    PROCEDURE PERFORMED: Lumbar Medial Branch Block, bilateral L3/4, L4/5    The patient presents with a history of lumbosacral spondylosis [ bilaterally ] at level L3, L4, L5.  The patient presents today for a lumbar medial branch block. This is the [ first ] procedure. The patient understands the risks and benefits of the procedure and wishes to proceed. The patient was seen in the preoperative area.  Patient's consent was obtained and updated.  Vitals were taken.  Patient was then brought to the procedure suite and placed in a prone position. The appropriate anatomic area was widely prepped with Chloroprep and draped in a sterile fashion.  Under fluoroscopic guidance an AP view with no tilt was obtained. A 22 gauge curved tip spinal needle was passed through skin anesthetized with 1% Lidocaine without epinephrine. The needle tip was guided into the superior medial aspect of the transverse process at L3, L4, L5.   At this point 0.5 mL of 0.25% Marcaine was injected. A sterile dressing was placed over the puncture sites. The patient tolerated the procedure with [ no complications ]. They were then brought to the post procedure area where they recovered nicely.    Approximately 25 minutes after procedure the patient reported [ 85 ]% relief from intervention.    Discharge:  The patient will be discharged home in stable condition.   Patient understands to contact the Center with any post procedure questions or concerns.  Discharge instructions given by nursing staff.

## 2024-10-02 ENCOUNTER — TELEPHONE (OUTPATIENT)
Dept: PAIN MEDICINE | Facility: HOSPITAL | Age: 69
End: 2024-10-02
Payer: MEDICARE

## 2024-10-02 NOTE — TELEPHONE ENCOUNTER
Post procedure phone call completed.  Pt states they are doing good and denies questions or concerns.  Patient reports pain level a #0 with 100% pain relief.

## 2024-10-07 DIAGNOSIS — E03.9 ACQUIRED HYPOTHYROIDISM: ICD-10-CM

## 2024-10-07 RX ORDER — LEVOTHYROXINE SODIUM 88 MCG
88 TABLET ORAL DAILY
Qty: 90 TABLET | Refills: 1 | Status: SHIPPED | OUTPATIENT
Start: 2024-10-07

## 2024-10-25 NOTE — PROGRESS NOTES
Chief Complaint  Diabetes and Obesity    History of Present Illness  Johanna Swan presents today for follow up on diabetes and weight management    Diabetes  Patient does not check blood sugar at home daily.  Associated symptoms include None  Per patient current diet is Well Balanced.  Patient does not avoid concentrated sweets.  Patient does see podiatry.  Patient see's Dr. Ledezma for diabetic eye exam and last eye exam was 2023.  Patient reports they are taking medications as prescribed and they are not having side effects.    Weight Management  Patient is currently taking Mounjaro.  Patient reports they are taking medications as prescribed and they are not having side effects.  Current diet is Well Balanced  Patient has gained 1 lb in 3 months  Patient is not exercising.      History of Present Illness  The patient is a 68-year-old female who presents for follow-up on diabetes and weight management. She is accompanied by her .    Her weight has been stable at 188 pounds. She is currently taking Mounjaro 7.5 mg weekly and reports an improvement in her condition with this medication. Initially, she experienced explosive diarrheal bowel movements and gas, but these symptoms have significantly subsided.    She has continued Mounjaro 7.5 mg weekly and is considering an increase to 10 mg, as she has a supply of this dosage at home. She has experienced some weight loss, which she attributes to a decrease in appetite. However, she notes that her appetite has recently returned, particularly in the mornings.    She has been advised to consume smaller meals with fewer calories. Additionally, she mentions that she has been eating at home almost exclusively, previously had been eating out frequently due to a dislike for cooking.  She had been seen by Dr. Flood on 9/16/24 and treated with Augmentin for sinus infection symptoms have pretty much resolved.  She  has established with Dr. Khan for pain  management.    Patient Care Team:  Spring Silverio MD as PCP - General (Family Medicine)  Spring Silverio MD as PCP - Family Medicine   Current Outpatient Medications on File Prior to Visit   Medication Sig    albuterol sulfate  (90 Base) MCG/ACT inhaler Inhale 2 puffs Every 6 (Six) Hours As Needed for Wheezing.    Amitiza 24 MCG capsule Take 1 capsule by mouth 2 (Two) Times a Day.    Azelastine HCl 137 MCG/SPRAY solution USE 1 TO 2 SPRAY(S) IN EACH NOSTRIL TWICE DAILY AS NEEDED FOR DRAINAGE, SNEEZING OR COUGH    benazepril (LOTENSIN) 10 MG tablet Take 1 tablet by mouth Daily.    dicyclomine (BENTYL) 10 MG capsule Take 1 capsule by mouth 4 (Four) Times a Day Before Meals & at Bedtime.    DULoxetine (CYMBALTA) 60 MG capsule Take 1 capsule by mouth Daily.    EPINEPHrine (EPIPEN) 0.3 MG/0.3ML solution auto-injector injection     Erenumab-aooe (AIMOVIG) 140 MG/ML prefilled syringe Inject 0.0071 mL under the skin into the appropriate area as directed Every 30 (Thirty) Days.    esomeprazole (nexIUM) 40 MG capsule Take 1 capsule by mouth Daily.    glucose blood (OneTouch Verio) test strip Use as instructed    glucose blood (OneTouch Verio) test strip Use as instructed to check glucose once daily    guaiFENesin-codeine (GUAIFENESIN AC) 100-10 MG/5ML liquid Take 5 mL by mouth 3 (Three) Times a Day As Needed for Cough.    HYDROcodone-acetaminophen (NORCO) 5-325 MG per tablet Take 1 tablet by mouth Daily As Needed for Moderate Pain.    HYDROcodone-acetaminophen (Norco) 5-325 MG per tablet Take 1 tablet by mouth Daily As Needed for Moderate Pain.    HYDROcodone-acetaminophen (Norco) 5-325 MG per tablet Take 1 tablet by mouth Daily As Needed for Moderate Pain.    hydrOXYzine (ATARAX) 25 MG tablet Take 1 tablet by mouth 3 (Three) Times a Day As Needed for Itching.    ipratropium (ATROVENT) 0.03 % nasal spray 2 sprays into the nostril(s) as directed by provider Every 12 (Twelve) Hours for 5 days.     "Lancets Misc. kit Brand to match current supply of lancets DX E11.9    lidocaine (XYLOCAINE) 5 % ointment APPLY .5 G OF CREAM TO AFFECTED AREA NOT EXCEEDING MORE THEN 13 TUBE PER DAY.    Lidocaine-Tetracaine 7-7 % cream APPLY TO AFFECTED AREA TWICE DAILY AS NEEDED    Lyrica 150 MG capsule Take 1 capsule by mouth 2 (Two) Times a Day.    metoprolol succinate XL (TOPROL-XL) 25 MG 24 hr tablet Take 1 tablet by mouth once daily    mometasone (Elocon) 0.1 % cream Apply to rash twice daily as needed    mometasone (NASONEX) 50 MCG/ACT nasal spray 2 sprays into the nostril(s) as directed by provider Daily.    NON FORMULARY Allergy shots    ondansetron (Zofran) 4 MG tablet 1-2 tablets every 6-8 hours if needed.    OneTouch Verio test strip USE AND DISCARD 1 TEST     STRIP DAILY AS DIRECTED    rizatriptan (MAXALT) 10 MG tablet Take 1 tablet by mouth 1 (One) Time As Needed for Migraine (May repeat in 2 hours if needed for persistent headache).    rosuvastatin (CRESTOR) 5 MG tablet TAKE 1 TABLET BY MOUTH DAILY.    Synthroid 88 MCG tablet TAKE ONE TABLET BY MOUTH EVERY DAY    tamsulosin (FLOMAX) 0.4 MG capsule 24 hr capsule Take 1 capsule by mouth Daily.    tiZANidine (ZANAFLEX) 4 MG tablet Take 1 tablet by mouth Every 8 (Eight) Hours As Needed for Muscle Spasms.    [DISCONTINUED] Tirzepatide (Mounjaro) 7.5 MG/0.5ML solution pen-injector pen INJECT 0.5 ML SUBCUTANEOUSLY INTO THE APPROPRIATE AREA AS DIRECTED ONCE WEEKLY    benzonatate (TESSALON) 200 MG capsule Take 1 capsule by mouth 3 (Three) Times a Day As Needed for Cough. (Patient not taking: Reported on 10/28/2024)     No current facility-administered medications on file prior to visit.       Objective   Vital Signs:   /76 (BP Location: Right arm, Patient Position: Sitting, Cuff Size: Large Adult)   Pulse 109   Temp 97.8 °F (36.6 °C) (Temporal)   Resp 18   Ht 160 cm (63\")   Wt 85.3 kg (188 lb)   SpO2 93%   BMI 33.30 kg/m²    BP Readings from Last 3 Encounters: "   10/28/24 112/76   10/01/24 115/70   09/16/24 126/74     Wt Readings from Last 3 Encounters:   10/28/24 85.3 kg (188 lb)   10/01/24 85.3 kg (188 lb)   09/30/24 85.3 kg (188 lb)         Physical Exam  Vitals and nursing note reviewed.   Constitutional:       General: She is not in acute distress.     Appearance: She is well-developed. She is obese.   HENT:      Head: Normocephalic and atraumatic.   Cardiovascular:      Rate and Rhythm: Normal rate and regular rhythm.      Heart sounds: No murmur heard.  Pulmonary:      Effort: Pulmonary effort is normal.      Breath sounds: Normal breath sounds. No wheezing.   Musculoskeletal:         General: Normal range of motion.   Skin:     General: Skin is warm and dry.      Findings: No rash.   Neurological:      Mental Status: She is alert and oriented to person, place, and time.   Psychiatric:         Mood and Affect: Mood normal.          Physical Exam        No visits with results within 1 Day(s) from this visit.   Latest known visit with results is:   Results Encounter on 10/19/2024   Component Date Value Ref Range Status    Hemoglobin A1C 10/25/2024 6.4 (H)  4.8 - 5.6 % Final    Comment:          Prediabetes: 5.7 - 6.4           Diabetes: >6.4           Glycemic control for adults with diabetes: <7.0      Glucose 10/25/2024 101 (H)  70 - 99 mg/dL Final    BUN 10/25/2024 12  8 - 27 mg/dL Final    Creatinine 10/25/2024 0.82  0.57 - 1.00 mg/dL Final    EGFR Result 10/25/2024 78  >59 mL/min/1.73 Final    BUN/Creatinine Ratio 10/25/2024 15  12 - 28 Final    Sodium 10/25/2024 139  134 - 144 mmol/L Final    Potassium 10/25/2024 4.8  3.5 - 5.2 mmol/L Final    Chloride 10/25/2024 101  96 - 106 mmol/L Final    Total CO2 10/25/2024 24  20 - 29 mmol/L Final    Calcium 10/25/2024 9.1  8.7 - 10.3 mg/dL Final    Total Protein 10/25/2024 7.0  6.0 - 8.5 g/dL Final    Albumin 10/25/2024 4.2  3.9 - 4.9 g/dL Final    Globulin 10/25/2024 2.8  1.5 - 4.5 g/dL Final    Total Bilirubin  10/25/2024 0.7  0.0 - 1.2 mg/dL Final    Alkaline Phosphatase 10/25/2024 112  44 - 121 IU/L Final    AST (SGOT) 10/25/2024 15  0 - 40 IU/L Final    ALT (SGPT) 10/25/2024 13  0 - 32 IU/L Final     A1C Last 3 Results          4/5/2024    11:36 7/23/2024    15:24 10/25/2024    10:54   HGBA1C Last 3 Results   Hemoglobin A1C 6.6  6.1  6.4      Lab Results   Component Value Date    CHLPL 163 04/05/2024    TRIG 142 04/05/2024    HDL 46 04/05/2024    LDL 92 04/05/2024     Lab Results   Component Value Date    TSH 2.010 04/05/2024     Lab Results   Component Value Date    GLUCOSE 101 (H) 10/25/2024    BUN 12 10/25/2024    CREATININE 0.82 10/25/2024    EGFRIFNONA 70 02/21/2022    EGFRIFAFRI 81 02/21/2022    BCR 15 10/25/2024    K 4.8 10/25/2024    CO2 24 10/25/2024    CALCIUM 9.1 10/25/2024    PROTENTOTREF 7.0 10/25/2024    ALBUMIN 4.2 10/25/2024    LABIL2 1.5 04/05/2024    AST 15 10/25/2024    ALT 13 10/25/2024     Lab Results   Component Value Date    WBC 5.0 04/21/2023    HGB 12.3 04/21/2023    HCT 37.8 04/21/2023    MCV 87 04/21/2023     04/21/2023             Results  Laboratory Studies  A1c was slightly elevated to 6.4 from previous 6.1. Glucose level was 101.             Assessment and Plan    Diagnoses and all orders for this visit:    1. Type 2 diabetes mellitus without complication, without long-term current use of insulin (Primary)  -     Tirzepatide (MOUNJARO) 10 MG/0.5ML solution pen-injector pen; Inject 0.5 mL under the skin into the appropriate area as directed 1 (One) Time Per Week.  Dispense: 6 mL; Refill: 0    2. Class 1 obesity with body mass index (BMI) of 33.0 to 33.9 in adult, unspecified obesity type, unspecified whether serious comorbidity present    3. Need for influenza vaccination  -     Fluzone High-Dose 65+yrs    4. Need for COVID-19 vaccine  -     COVID-19 (Pfizer) 12yrs+ (COMIRNATY)        Assessment & Plan  1. Diabetes Mellitus.  Her A1c has increased slightly to 6.4 from 6.1, but it  remains within acceptable limits. She is currently taking Mounjaro 7.5 mg weekly and reports improved bowel movements with some residual stomach discomfort and gas. She has a month and a half supply of Mounjaro 7.5 mg left and a 10 mg dose in the refrigerator. A prescription for Mounjaro 10 mg will be sent to [facility]. She is advised to increase the dose to 10 mg after finishing her current supply. Potential GI side effects with the increased dose were discussed. She is instructed to maintain a regular eating schedule with smaller portions three times a day, incorporating more vegetables, whole grains, and fruits while reducing high-calorie foods. A 90-day supply of Mounjaro will be provided.    2. Weight Management.  Her weight has remained stable at 188 pounds. She reports an increased appetite, which may be contributing to her weight stability. She is advised to continue focusing on a balanced diet with smaller portions and to avoid deep-fried foods. Alternatives such as pan frying and baking were discussed. She is encouraged to use healthier cooking methods to reduce fat intake.    3. Health Maintenance.   influenza and COVID-19 vaccines today. She is also due for her Medicare wellness visit and will be scheduled to return within the next month.    Follow-up  Return in 1 month for Medicare wellness visit.      Medications Discontinued During This Encounter   Medication Reason    Tirzepatide (Mounjaro) 7.5 MG/0.5ML solution pen-injector pen Dose adjustment         Follow Up     Return in about 4 weeks (around 11/25/2024) for Medicare Wellness.    Patient was given instructions and counseling regarding her condition or for health maintenance advice. Please see specific information pulled into the AVS if appropriate.     Patient or patient representative verbalized consent for the use of Ambient Listening during the visit with  Spring Silverio MD for chart documentation. 10/28/2024  16:07 EDT

## 2024-10-28 ENCOUNTER — OFFICE VISIT (OUTPATIENT)
Dept: FAMILY MEDICINE CLINIC | Facility: CLINIC | Age: 69
End: 2024-10-28
Payer: MEDICARE

## 2024-10-28 VITALS
OXYGEN SATURATION: 93 % | HEART RATE: 109 BPM | SYSTOLIC BLOOD PRESSURE: 112 MMHG | BODY MASS INDEX: 33.31 KG/M2 | TEMPERATURE: 97.8 F | RESPIRATION RATE: 18 BRPM | HEIGHT: 63 IN | WEIGHT: 188 LBS | DIASTOLIC BLOOD PRESSURE: 76 MMHG

## 2024-10-28 DIAGNOSIS — Z23 NEED FOR INFLUENZA VACCINATION: ICD-10-CM

## 2024-10-28 DIAGNOSIS — E66.811 CLASS 1 OBESITY WITH BODY MASS INDEX (BMI) OF 33.0 TO 33.9 IN ADULT, UNSPECIFIED OBESITY TYPE, UNSPECIFIED WHETHER SERIOUS COMORBIDITY PRESENT: ICD-10-CM

## 2024-10-28 DIAGNOSIS — E11.9 TYPE 2 DIABETES MELLITUS WITHOUT COMPLICATION, WITHOUT LONG-TERM CURRENT USE OF INSULIN: Primary | ICD-10-CM

## 2024-10-28 DIAGNOSIS — Z23 NEED FOR COVID-19 VACCINE: ICD-10-CM

## 2024-10-28 PROCEDURE — G0008 ADMIN INFLUENZA VIRUS VAC: HCPCS | Performed by: FAMILY MEDICINE

## 2024-10-28 PROCEDURE — 3074F SYST BP LT 130 MM HG: CPT | Performed by: FAMILY MEDICINE

## 2024-10-28 PROCEDURE — 91320 SARSCV2 VAC 30MCG TRS-SUC IM: CPT | Performed by: FAMILY MEDICINE

## 2024-10-28 PROCEDURE — 90480 ADMN SARSCOV2 VAC 1/ONLY CMP: CPT | Performed by: FAMILY MEDICINE

## 2024-10-28 PROCEDURE — 3044F HG A1C LEVEL LT 7.0%: CPT | Performed by: FAMILY MEDICINE

## 2024-10-28 PROCEDURE — 1159F MED LIST DOCD IN RCRD: CPT | Performed by: FAMILY MEDICINE

## 2024-10-28 PROCEDURE — 90662 IIV NO PRSV INCREASED AG IM: CPT | Performed by: FAMILY MEDICINE

## 2024-10-28 PROCEDURE — 99214 OFFICE O/P EST MOD 30 MIN: CPT | Performed by: FAMILY MEDICINE

## 2024-10-28 PROCEDURE — 1126F AMNT PAIN NOTED NONE PRSNT: CPT | Performed by: FAMILY MEDICINE

## 2024-10-28 PROCEDURE — 1160F RVW MEDS BY RX/DR IN RCRD: CPT | Performed by: FAMILY MEDICINE

## 2024-10-28 PROCEDURE — 3078F DIAST BP <80 MM HG: CPT | Performed by: FAMILY MEDICINE

## 2024-10-29 ENCOUNTER — HOSPITAL ENCOUNTER (OUTPATIENT)
Dept: PAIN MEDICINE | Facility: HOSPITAL | Age: 69
Discharge: HOME | End: 2024-10-29
Payer: MEDICARE

## 2024-10-29 VITALS
RESPIRATION RATE: 16 BRPM | DIASTOLIC BLOOD PRESSURE: 76 MMHG | OXYGEN SATURATION: 96 % | TEMPERATURE: 97.1 F | HEART RATE: 82 BPM | HEIGHT: 63 IN | WEIGHT: 188 LBS | SYSTOLIC BLOOD PRESSURE: 114 MMHG | BODY MASS INDEX: 33.31 KG/M2

## 2024-10-29 DIAGNOSIS — M54.41 CHRONIC BILATERAL LOW BACK PAIN WITH BILATERAL SCIATICA: Primary | ICD-10-CM

## 2024-10-29 DIAGNOSIS — M47.16 LUMBAR SPONDYLOSIS WITH MYELOPATHY: ICD-10-CM

## 2024-10-29 DIAGNOSIS — G89.29 CHRONIC BILATERAL LOW BACK PAIN WITH BILATERAL SCIATICA: Primary | ICD-10-CM

## 2024-10-29 DIAGNOSIS — M54.42 CHRONIC BILATERAL LOW BACK PAIN WITH BILATERAL SCIATICA: Primary | ICD-10-CM

## 2024-10-29 DIAGNOSIS — R52 PAIN: ICD-10-CM

## 2024-10-29 PROCEDURE — 25010000002 BUPIVACAINE (PF) 0.25 % SOLUTION: Performed by: PHYSICAL MEDICINE & REHABILITATION

## 2024-10-29 PROCEDURE — 77003 FLUOROGUIDE FOR SPINE INJECT: CPT

## 2024-10-29 RX ORDER — BUPIVACAINE HYDROCHLORIDE 2.5 MG/ML
5 INJECTION, SOLUTION EPIDURAL; INFILTRATION; INTRACAUDAL ONCE
Status: COMPLETED | OUTPATIENT
Start: 2024-10-29 | End: 2024-10-29

## 2024-10-29 RX ADMIN — BUPIVACAINE HYDROCHLORIDE 5 ML: 2.5 INJECTION, SOLUTION EPIDURAL; INFILTRATION; INTRACAUDAL; PERINEURAL at 11:36

## 2024-10-29 NOTE — PROCEDURES
Procedures    Lumbar Medial Branch Block    PREOPERATIVE DIAGNOSIS: Lumbar spondylosis    POSTOPERATIVE DIAGNOSIS: Lumbar spondylosis    PROCEDURE PERFORMED: Lumbar Medial Branch Block, bilateral L3/4, L4/5    The patient presents with a history of lumbosacral spondylosis [ bilaterally ] at level L3, L4, L5.  The patient presents today for a lumbar medial branch block. This is the [ second ] procedure. The patient understands the risks and benefits of the procedure and wishes to proceed. The patient was seen in the preoperative area.  Patient's consent was obtained and updated.  Vitals were taken.  Patient was then brought to the procedure suite and placed in a prone position. The appropriate anatomic area was widely prepped with Chloroprep and draped in a sterile fashion.  Under fluoroscopic guidance an AP view with no tilt was obtained. A 22 gauge curved tip spinal needle was passed through skin anesthetized with 1% Lidocaine without epinephrine. The needle tip was guided into the superior medial aspect of the transverse process at L3, L4, L5.   At this point 0.5 mL of 0.25% Marcaine was injected. A sterile dressing was placed over the puncture sites. The patient tolerated the procedure with [ no complications ]. They were then brought to the post procedure area where they recovered nicely.    Approximately 25 minutes after procedure the patient reported [ 85 ]% relief from intervention.    Discharge:  The patient will be discharged home in stable condition.   Patient understands to contact the Center with any post procedure questions or concerns.  Discharge instructions given by nursing staff.

## 2024-10-30 ENCOUNTER — TELEPHONE (OUTPATIENT)
Dept: PAIN MEDICINE | Facility: HOSPITAL | Age: 69
End: 2024-10-30
Payer: MEDICARE

## 2024-10-31 ENCOUNTER — TELEPHONE (OUTPATIENT)
Dept: PAIN MEDICINE | Facility: CLINIC | Age: 69
End: 2024-10-31
Payer: MEDICARE

## 2024-10-31 NOTE — TELEPHONE ENCOUNTER
Pt returned post procedure phone call, no complications post procedure. PT received 100% relief following Medial Branch Block on 10/29/2024.

## 2024-11-08 ENCOUNTER — PATIENT MESSAGE (OUTPATIENT)
Dept: PAIN MEDICINE | Facility: CLINIC | Age: 69
End: 2024-11-08
Payer: MEDICARE

## 2024-11-08 DIAGNOSIS — M54.42 CHRONIC BILATERAL LOW BACK PAIN WITH BILATERAL SCIATICA: Primary | Chronic | ICD-10-CM

## 2024-11-08 DIAGNOSIS — G89.29 CHRONIC BILATERAL LOW BACK PAIN WITH BILATERAL SCIATICA: Primary | Chronic | ICD-10-CM

## 2024-11-08 DIAGNOSIS — M54.41 CHRONIC BILATERAL LOW BACK PAIN WITH BILATERAL SCIATICA: Primary | Chronic | ICD-10-CM

## 2024-11-10 DIAGNOSIS — E78.2 MIXED HYPERLIPIDEMIA: ICD-10-CM

## 2024-11-10 DIAGNOSIS — I10 ESSENTIAL HYPERTENSION: ICD-10-CM

## 2024-11-11 RX ORDER — ROSUVASTATIN CALCIUM 5 MG/1
5 TABLET, COATED ORAL DAILY
Qty: 90 TABLET | Refills: 1 | Status: SHIPPED | OUTPATIENT
Start: 2024-11-11

## 2024-11-11 RX ORDER — BENAZEPRIL HYDROCHLORIDE 10 MG/1
10 TABLET ORAL DAILY
Qty: 90 TABLET | Refills: 3 | Status: SHIPPED | OUTPATIENT
Start: 2024-11-11

## 2024-12-09 ENCOUNTER — POP HEALTH PHARMACY (OUTPATIENT)
Dept: PHARMACY | Facility: OTHER | Age: 69
End: 2024-12-09
Payer: MEDICARE

## 2024-12-12 ENCOUNTER — HOSPITAL ENCOUNTER (OUTPATIENT)
Dept: PAIN MEDICINE | Facility: HOSPITAL | Age: 69
Discharge: HOME | End: 2024-12-12
Payer: MEDICARE

## 2024-12-12 VITALS
HEIGHT: 63 IN | HEART RATE: 87 BPM | TEMPERATURE: 97.3 F | RESPIRATION RATE: 16 BRPM | SYSTOLIC BLOOD PRESSURE: 120 MMHG | WEIGHT: 188 LBS | DIASTOLIC BLOOD PRESSURE: 83 MMHG | BODY MASS INDEX: 33.31 KG/M2 | OXYGEN SATURATION: 96 %

## 2024-12-12 DIAGNOSIS — R52 PAIN: ICD-10-CM

## 2024-12-12 DIAGNOSIS — M54.41 CHRONIC BILATERAL LOW BACK PAIN WITH BILATERAL SCIATICA: Primary | ICD-10-CM

## 2024-12-12 DIAGNOSIS — M47.16 LUMBAR SPONDYLOSIS WITH MYELOPATHY: ICD-10-CM

## 2024-12-12 DIAGNOSIS — G89.29 CHRONIC BILATERAL LOW BACK PAIN WITH BILATERAL SCIATICA: Primary | ICD-10-CM

## 2024-12-12 DIAGNOSIS — M54.42 CHRONIC BILATERAL LOW BACK PAIN WITH BILATERAL SCIATICA: Primary | ICD-10-CM

## 2024-12-12 PROCEDURE — 25010000002 LIDOCAINE PF 1% 1 % SOLUTION: Performed by: PHYSICAL MEDICINE & REHABILITATION

## 2024-12-12 PROCEDURE — 25010000002 METHYLPREDNISOLONE PER 40 MG: Performed by: PHYSICAL MEDICINE & REHABILITATION

## 2024-12-12 PROCEDURE — 25010000002 BUPIVACAINE (PF) 0.25 % SOLUTION: Performed by: PHYSICAL MEDICINE & REHABILITATION

## 2024-12-12 PROCEDURE — 77003 FLUOROGUIDE FOR SPINE INJECT: CPT

## 2024-12-12 RX ORDER — BUPIVACAINE HYDROCHLORIDE 2.5 MG/ML
5 INJECTION, SOLUTION EPIDURAL; INFILTRATION; INTRACAUDAL ONCE
Status: COMPLETED | OUTPATIENT
Start: 2024-12-12 | End: 2024-12-12

## 2024-12-12 RX ORDER — LIDOCAINE HYDROCHLORIDE 10 MG/ML
5 INJECTION, SOLUTION EPIDURAL; INFILTRATION; INTRACAUDAL; PERINEURAL ONCE
Status: COMPLETED | OUTPATIENT
Start: 2024-12-12 | End: 2024-12-12

## 2024-12-12 RX ORDER — METHYLPREDNISOLONE ACETATE 40 MG/ML
40 INJECTION, SUSPENSION INTRA-ARTICULAR; INTRALESIONAL; INTRAMUSCULAR; SOFT TISSUE ONCE
Status: COMPLETED | OUTPATIENT
Start: 2024-12-12 | End: 2024-12-12

## 2024-12-12 RX ADMIN — METHYLPREDNISOLONE ACETATE 40 MG: 40 INJECTION, SUSPENSION INTRA-ARTICULAR; INTRALESIONAL; INTRAMUSCULAR; INTRASYNOVIAL; SOFT TISSUE at 13:58

## 2024-12-12 RX ADMIN — LIDOCAINE HYDROCHLORIDE 5 ML: 10 INJECTION, SOLUTION EPIDURAL; INFILTRATION; INTRACAUDAL; PERINEURAL at 13:51

## 2024-12-12 RX ADMIN — BUPIVACAINE HYDROCHLORIDE 5 ML: 2.5 INJECTION, SOLUTION EPIDURAL; INFILTRATION; INTRACAUDAL; PERINEURAL at 13:58

## 2024-12-12 NOTE — PROCEDURES
Procedures    Lumbar Radiofrequency Ablation    Pre-Procedure Diagnosis: Lumbar Facet Syndrome    Post-Procedure Diagnosis: Lumbar Facet Syndrome    Description of Procedure: Left L2, L3, L4 medial branch radiofrequency ablation    Indications for Procedure: Lumbar Facet Disease    Anesthetic/Sedation: none         Description of Findings (include specimens removed, if any): Patient positioned   prone on fluoro table. After prepped w/ chloroprep and draped, a 20 G 100 mm   curved RF needle with 10 mm active tip directed under fluoro guidance to the left L2, L3, and L4 medial branch. Sensory and motor testing conducted at each level individually with sensation confined to the lower back. Each needle then anesthetized with Lidocaine 1% 1ml, followed by a 240 sec pause. Lateral images before and after injection of Lidocaine compared to ensure no needle migration. All 3 levels were lesioned at same time.    Level     Impedance    Sensory(50 Hz)        Motor (2Hz)   Lesion    L2      300          0.4v                           2.0v         80 degrees C/90 sec  L3      376          0.5v                           2.0v         80 degrees C/90 sec  L4            289                  0.7v                           2.0v         80 degrees C/90 sec    Injected 1 cc 1% Lidocaine at each level prior to lesioning followed by 240 second pause    Patient tolerated the procedure well. Vital signs remained stable throughout   the procedure. Sensorimotor exam of the left leg unchanged following the procedure.      Condition: Stable. Endorses good pain relief.

## 2024-12-12 NOTE — DISCHARGE INSTRUCTIONS
Radiofrequency Lesioning, Care After    Refer to this sheet in the next few weeks. These instructions provide you with information about caring for yourself after your procedure. Your health care provider may also give you more specific instructions. Your treatment has been planned according to current medical practices, but problems sometimes occur. Call your health care provider if you have any problems or questions after your procedure.  What can I expect after the procedure?  After the procedure, it is common to have:  Pain from the burned nerve.  You may feel a burning sensation for up to 1-2 weeks after the procedure  Temporary numbness at the site  Your leg/legs may be weak or feel numb after the procedure until the numbing medication wears off.  When you are up and walking, have assistance to prevent falling.     Follow these instructions at home:  Take over-the-counter and prescription medicines only as told by your health care provider.  Return to your normal activities as told by your health care provider. Ask your health care provider what activities are safe for you.  Pay close attention to how you feel after the procedure. If you start to have pain, write down when it hurts and how it feels. This will help you and your health care provider to know if you need an additional treatment.  Check your needle insertion site every day for signs of infection. Watch for:  Redness, swelling, or pain.  Fluid, blood, or pus.  Keep all follow-up visits as told by your health care provider. This is important.  No driving for 24 hrs-make sure you have full sensation in your legs prior to driving.  Avoid using heat on the injection site for 24 hours. You may use ice intermittently if needed by placing a               towel between your skin and the ice bag and using the ice for 20 minutes 2-3 times a day.  Do not take baths, swim or use a hot tub for 24 hours.  Leave your band-aids on for 24 hours and keep them  dry.    Contact a health care provider if:  Your pain does not get better.  You have redness, swelling, or pain at the needle insertion site.  You have fluid, blood, or pus coming from the needle insertion site.  You have a fever over 101 degrees.  You have new numb.ness in your arm or leg after the procedure    Get help right away if:  You develop sudden, severe pain.  You develop numbness or tingling near the procedure site that does not go away.  This information is not intended to replace advice given to you by your health care provider. Make sure you discuss any questions you have with your health care provider.  Document Released: 08/16/2012 Document Revised: 05/25/2017 Document Reviewed: 01/25/2016  [employer] Interactive Patient Education © 2019 [employer]

## 2024-12-13 ENCOUNTER — TELEPHONE (OUTPATIENT)
Dept: PAIN MEDICINE | Facility: HOSPITAL | Age: 69
End: 2024-12-13
Payer: MEDICARE

## 2024-12-18 NOTE — PROGRESS NOTES
Subjective   The ABCs of the Annual Wellness Visit  Medicare Wellness Visit      Johanna Swan is a 68 y.o. patient who presents for a Medicare Wellness Visit.    The following portions of the patient's history were reviewed and   updated as appropriate: allergies, current medications, past family history, past medical history, past social history, past surgical history, and problem list.    Compared to one year ago, the patient's physical   health is the same.  Compared to one year ago, the patient's mental   health is the same.    Recent Hospitalizations:  She was not admitted to the hospital during the last year.     Current Medical Providers:  Patient Care Team:  Spring Silverio MD as PCP - General (Family Medicine)  Spring Silverio MD as PCP - Family Medicine    Outpatient Medications Prior to Visit   Medication Sig Dispense Refill    albuterol sulfate  (90 Base) MCG/ACT inhaler Inhale 2 puffs Every 6 (Six) Hours As Needed for Wheezing. 18 g 3    Amitiza 24 MCG capsule Take 1 capsule by mouth 2 (Two) Times a Day.  3    Azelastine HCl 137 MCG/SPRAY solution USE 1 TO 2 SPRAY(S) IN EACH NOSTRIL TWICE DAILY AS NEEDED FOR DRAINAGE, SNEEZING OR COUGH      benazepril (LOTENSIN) 10 MG tablet TAKE 1 TABLET BY MOUTH DAILY 90 tablet 3    dicyclomine (BENTYL) 10 MG capsule Take 1 capsule by mouth 4 (Four) Times a Day Before Meals & at Bedtime. 30 capsule 0    DULoxetine (CYMBALTA) 60 MG capsule Take 1 capsule by mouth Daily.      EPINEPHrine (EPIPEN) 0.3 MG/0.3ML solution auto-injector injection       Erenumab-aooe (AIMOVIG) 140 MG/ML prefilled syringe Inject 0.0071 mL under the skin into the appropriate area as directed Every 30 (Thirty) Days.      esomeprazole (nexIUM) 40 MG capsule Take 1 capsule by mouth Daily.      glucose blood (OneTouch Verio) test strip Use as instructed 100 each 3    glucose blood (OneTouch Verio) test strip Use as instructed to check glucose once daily 50 each 12     guaiFENesin-codeine (GUAIFENESIN AC) 100-10 MG/5ML liquid Take 5 mL by mouth 3 (Three) Times a Day As Needed for Cough. 90 mL 0    HYDROcodone-acetaminophen (NORCO) 5-325 MG per tablet Take 1 tablet by mouth Daily As Needed for Moderate Pain. 30 tablet 0    HYDROcodone-acetaminophen (Norco) 5-325 MG per tablet Take 1 tablet by mouth Daily As Needed for Moderate Pain. 30 tablet 0    HYDROcodone-acetaminophen (Norco) 5-325 MG per tablet Take 1 tablet by mouth Daily As Needed for Moderate Pain. 30 tablet 0    hydrOXYzine (ATARAX) 25 MG tablet Take 1 tablet by mouth 3 (Three) Times a Day As Needed for Itching. 30 tablet 0    Lancets Misc. kit Brand to match current supply of lancets DX E11.9 1 each 0    lidocaine (XYLOCAINE) 5 % ointment APPLY .5 G OF CREAM TO AFFECTED AREA NOT EXCEEDING MORE THEN 13 TUBE PER DAY.      Lidocaine-Tetracaine 7-7 % cream APPLY TO AFFECTED AREA TWICE DAILY AS NEEDED      Lyrica 150 MG capsule Take 1 capsule by mouth 2 (Two) Times a Day. 60 capsule 2    metoprolol succinate XL (TOPROL-XL) 25 MG 24 hr tablet Take 1 tablet by mouth once daily 90 tablet 0    mometasone (Elocon) 0.1 % cream Apply to rash twice daily as needed 50 g 1    mometasone (NASONEX) 50 MCG/ACT nasal spray 2 sprays into the nostril(s) as directed by provider Daily. 3 each 3    NON FORMULARY Allergy shots      ondansetron (Zofran) 4 MG tablet 1-2 tablets every 6-8 hours if needed. 30 tablet 1    OneTouch Verio test strip USE AND DISCARD 1 TEST     STRIP DAILY AS DIRECTED 100 each 3    rizatriptan (MAXALT) 10 MG tablet Take 1 tablet by mouth 1 (One) Time As Needed for Migraine (May repeat in 2 hours if needed for persistent headache). 9 tablet 5    rosuvastatin (CRESTOR) 5 MG tablet TAKE ONE TABLET BY MOUTH EVERY DAY 90 tablet 1    Synthroid 88 MCG tablet TAKE ONE TABLET BY MOUTH EVERY DAY 90 tablet 1    tamsulosin (FLOMAX) 0.4 MG capsule 24 hr capsule Take 1 capsule by mouth Daily.  11    Tirzepatide 7.5 MG/0.5ML  solution auto-injector Inject 7.5 mg under the skin into the appropriate area as directed 1 (One) Time Per Week. 2 mL 0    tiZANidine (ZANAFLEX) 4 MG tablet Take 1 tablet by mouth Every 8 (Eight) Hours As Needed for Muscle Spasms. 90 tablet 1    benzonatate (TESSALON) 200 MG capsule Take 1 capsule by mouth 3 (Three) Times a Day As Needed for Cough. (Patient not taking: Reported on 9/30/2024) 30 capsule 0    ipratropium (ATROVENT) 0.03 % nasal spray 2 sprays into the nostril(s) as directed by provider Every 12 (Twelve) Hours for 5 days. (Patient not taking: Reported on 12/20/2024) 20 mL 0     No facility-administered medications prior to visit.     Opioid medication/s are on active medication list.  and I have evaluated her active treatment plan and pain score trends (see table).  Vitals:    12/20/24 1359   PainSc:   3   PainLoc: Back     I have reviewed the chart for potential of high risk medication and harmful drug interactions in the elderly.        Aspirin is not on active medication list.  Aspirin use is not indicated based on review of current medical condition/s. Risk of harm outweighs potential benefits.  .    Patient Active Problem List   Diagnosis    Acute stress reaction    Arthritis    Spinal stenosis of cervical region    Cyst, eyelid    Degeneration of lumbar or lumbosacral intervertebral disc    Degenerative disc disease, cervical    Herniated cervical disc    Esophageal stricture    Family history of diabetes mellitus    Hair loss    Hematuria    Hyperlipidemia    Hypertension    Hypothyroidism    Chronic pain of both knees    Class 1 obesity due to excess calories with serious comorbidity and body mass index (BMI) of 33.0 to 33.9 in adult    Osteoarthritis of knee    Restless leg syndrome    Cervicalgia    Thoracic back pain    Acid reflux disease    Allergic rhinitis    Chronic joint pain    Hernia, diaphragmatic    History of ulcer disease    Migraine    Nerve root disorder    Syringomyelia,  "acquired    Spinal stenosis of thoracic region    Cervical spondylosis with myelopathy    Osteoarthritis of carpometacarpal joint of thumb    Lumbar spondylosis with myelopathy    Colon polyps    GERD (gastroesophageal reflux disease)    IBS (irritable bowel syndrome)    Spinal stenosis of lumbar region with neurogenic claudication    Type 2 diabetes mellitus without complication, without long-term current use of insulin    History of colon polyps    Chronic bilateral low back pain with bilateral sciatica    Pain in both feet    Bunion of great toe of left foot    Plantar fasciitis    Nasal septal deviation    Sinus congestion     Advance Care Planning Advance Directive is on file.  ACP discussion was held with the patient during this visit. Patient has an advance directive in EMR which is still valid.             Objective   Vitals:    24 1359   BP: 132/90   BP Location: Right arm   Patient Position: Sitting   Cuff Size: Large Adult   Pulse: 100   Resp: 18   Temp: 97.3 °F (36.3 °C)   TempSrc: Temporal   SpO2: 94%   Weight: 85.4 kg (188 lb 3.2 oz)   Height: 158.8 cm (62.5\")   PainSc:   3   PainLoc: Back       Estimated body mass index is 33.87 kg/m² as calculated from the following:    Height as of this encounter: 158.8 cm (62.5\").    Weight as of this encounter: 85.4 kg (188 lb 3.2 oz).              ATTENTION  What is the year: correct  What is the month of the year: correct  What is the day of the week?: correct  What is the date?: correct  MEMORY  Repeat address three times, only score third attempt: Raphael Funez [address] [place] [address]: 7  HOW MANY ANIMALS DID THE PATIENT NAME  Verbal Fluency -- Animal Names (0-25): 17-21  CLOCK DRAWING  Clock Drawing: All Correct  MEMORY RECALL  Tell me what you remember about that name and address we were repeating at the beginnin  ACE TOTAL SCORE  Total ACE Score - <25/30 strongly suggests cognitive impairment; <21/30 almost certainly shows dementia: " 27    Does the patient have evidence of cognitive impairment? No  Lab Results   Component Value Date    HGBA1C 6.4 (H) 10/25/2024                                                                                                Health  Risk Assessment    Smoking Status:  Social History     Tobacco Use   Smoking Status Former    Current packs/day: 0.00    Average packs/day: 1.5 packs/day for 20.0 years (30.0 ttl pk-yrs)    Types: Cigarettes    Start date: 1970    Quit date: 1990    Years since quittin.9    Passive exposure: Past   Smokeless Tobacco Never     Alcohol Consumption:  Social History     Substance and Sexual Activity   Alcohol Use No       Fall Risk Screen  STEADI Fall Risk Assessment was completed, and patient is at LOW risk for falls.Assessment completed on:2024    Depression Screening   Little interest or pleasure in doing things? Not at all   Feeling down, depressed, or hopeless? Not at all   PHQ-2 Total Score 0      Health Habits and Functional and Cognitive Screenin/20/2024     2:00 PM   Functional & Cognitive Status   Do you have difficulty preparing food and eating? No   Do you have difficulty bathing yourself, getting dressed or grooming yourself? No   Do you have difficulty using the toilet? No   Do you have difficulty moving around from place to place? No   Do you have trouble with steps or getting out of a bed or a chair? No   Current Diet Well Balanced Diet   Dental Exam Up to date   Eye Exam Up to date   Exercise (times per week) 0 times per week   Current Exercises Include No Regular Exercise   Do you need help using the phone?  No   Are you deaf or do you have serious difficulty hearing?  No   Do you need help to go to places out of walking distance? No   Do you need help shopping? No   Do you need help preparing meals?  No   Do you need help with housework?  No   Do you need help with laundry? No   Do you need help taking your medications? No   Do you need help  managing money? No   Do you ever drive or ride in a car without wearing a seat belt? No   Have you felt unusual stress, anger or loneliness in the last month? No   Who do you live with? Spouse   If you need help, do you have trouble finding someone available to you? No   Have you been bothered in the last four weeks by sexual problems? No   Do you have difficulty concentrating, remembering or making decisions? Yes           Age-appropriate Screening Schedule:  Refer to the list below for future screening recommendations based on patient's age, sex and/or medical conditions. Orders for these recommended tests are listed in the plan section. The patient has been provided with a written plan.    Health Maintenance List  Health Maintenance   Topic Date Due    DIABETIC EYE EXAM  08/23/2024    DIABETIC FOOT EXAM  01/09/2025    LIPID PANEL  04/05/2025    HEMOGLOBIN A1C  04/25/2025    BMI FOLLOWUP  09/16/2025    ANNUAL WELLNESS VISIT  12/20/2025    TDAP/TD VACCINES (2 - Td or Tdap) 07/15/2026    COLORECTAL CANCER SCREENING  08/02/2026    MAMMOGRAM  08/16/2026    DXA SCAN  08/16/2026    HEPATITIS C SCREENING  Completed    COVID-19 Vaccine  Completed    INFLUENZA VACCINE  Completed    Pneumococcal Vaccine 65+  Completed    ZOSTER VACCINE  Completed    PAP SMEAR  Discontinued    URINE MICROALBUMIN  Discontinued                                                                                                                                                CMS Preventative Services Quick Reference  Risk Factors Identified During Encounter  See assessment and plan below    The above risks/problems have been discussed with the patient.  Pertinent information has been shared with the patient in the After Visit Summary.  An After Visit Summary and PPPS were made available to the patient.    Follow Up:   Next Medicare Wellness visit to be scheduled in 1 year.         Additional E&M Note during same encounter follows:  Patient has  "additional, significant, and separately identifiable condition(s)/problem(s) that require work above and beyond the Medicare Wellness Visit     Chief Complaint  Medicare Wellness-subsequent    Subjective    HPI         The patient is a 68-year-old female who presents for a Medicare wellness exam and follow-up on chronic medical conditions including hypertension, lipidemia, diabetes, and hypothyroidism. She is accompanied by her  and 2 grandchildren.    She has a history of diabetes and has been doing well on her current medications. Last labs were obtained 2 months ago. She is currently on tirzepatide 10 mg weekly and reports no side effects with her current medications. She does not need refills today.    She has a history of hypothyroidism and has been doing well on her current medications.    She has a history of lipidemia and has been doing well on her current medications.    She has a history of hypertension and has been doing well on her current medications.    Since last visit with myself she had a nerve ablation performed by pain management and reports significant improvement in her back pain.    MEDICATIONS  Current: tirzepatide, Synthroid, Crestor          Objective   Vital Signs:  /90 (BP Location: Right arm, Patient Position: Sitting, Cuff Size: Large Adult)   Pulse 100   Temp 97.3 °F (36.3 °C) (Temporal)   Resp 18   Ht 158.8 cm (62.5\")   Wt 85.4 kg (188 lb 3.2 oz)   SpO2 94%   BMI 33.87 kg/m²   Physical Exam  Vitals and nursing note reviewed.   Constitutional:       General: She is not in acute distress.     Appearance: She is well-developed. She is obese. She is not ill-appearing.   HENT:      Head: Normocephalic and atraumatic.   Cardiovascular:      Rate and Rhythm: Normal rate and regular rhythm.      Heart sounds: No murmur heard.  Pulmonary:      Effort: Pulmonary effort is normal.      Breath sounds: Normal breath sounds. No wheezing.   Musculoskeletal:         General: Normal " range of motion.   Skin:     General: Skin is warm and dry.      Findings: No rash.   Neurological:      Mental Status: She is alert and oriented to person, place, and time.   Psychiatric:         Mood and Affect: Mood normal.                        Assessment and Plan     Diagnoses and all orders for this visit:    1. Medicare annual wellness visit, subsequent (Primary)    2. Class 1 obesity due to excess calories with serious comorbidity and body mass index (BMI) of 33.0 to 33.9 in adult    3. Current smoker    4. Type 2 diabetes mellitus without complication, without long-term current use of insulin  -     Hemoglobin A1c; Future    5. Essential hypertension  -     Comprehensive Metabolic Panel; Future  -     CBC & Differential; Future    6. Mixed hyperlipidemia  -     Comprehensive Metabolic Panel; Future  -     Lipid Panel; Future    7. Acquired hypothyroidism  -     TSH; Future  -     T4, Free; Future    8. Degeneration of intervertebral disc of lumbosacral region with discogenic back pain    9. History of ulcer disease            1. Diabetes mellitus.  She has been doing well on her current medication, tirzepatide 10 mg weekly, with no reported side effects. A comprehensive set of laboratory tests, including A1c, TSH, T4, lipid panel, CBC, and CMP, will be conducted in 3 months. She will continue tirzepatide 10 mg weekly.    2. Hypothyroidism.  She is stable on her current medication, Synthroid 88 mcg weekly. Her TSH and T4 levels will be checked in 3 months.    3. Hyperlipidemia.  A lipid panel will be ordered to be completed in 3 months. She will continue Crestor 5 mg daily.    4. Hypertension adequate control continue current medications.    5. Back pain.  She reports significant improvement in her back pain following a nerve ablation.    6.  Health maintenance/Medicare wellness exam.  The patient was counseled regarding nutrition, physical activity, healthy weight, injury prevention, immunizations and  preventative health screenings.  Immunizations are up-to-date she reports completing a diabetic eye exam performed at [facility] by Dr. Sara Ledezma in [place] in October, we are calling for this record and we will update care gaps.    PROCEDURE  The patient underwent a nerve ablation procedure and reports significant improvement in her back pain.    Orders Placed This Encounter   Procedures    Hemoglobin A1c     Standing Status:   Future     Standing Expiration Date:   12/20/2025     Order Specific Question:   Release to patient     Answer:   Routine Release [[id number]]    Comprehensive Metabolic Panel     Standing Status:   Future     Standing Expiration Date:   12/20/2025     Order Specific Question:   Release to patient     Answer:   Routine Release [[id number]]    Lipid Panel     Standing Status:   Future     Standing Expiration Date:   12/20/2025     Order Specific Question:   Release to patient     Answer:   Routine Release [[id number]]    TSH     Standing Status:   Future     Standing Expiration Date:   12/20/2025     Order Specific Question:   Release to patient     Answer:   Routine Release [[id number]]    T4, Free     Standing Status:   Future     Standing Expiration Date:   12/20/2025     Order Specific Question:   Release to patient     Answer:   Routine Release [[id number]]    CBC & Differential     Standing Status:   Future     Standing Expiration Date:   12/20/2025     Order Specific Question:   Manual Differential     Answer:   No     Order Specific Question:   Release to patient     Answer:   Routine Release [[id number]]   Discussion with Patient and Family regarding advanced directives and end of life care was voluntarily entered by patient.  Patient has not had significant change in their medical condition in the past year.     Living Will, POST, and Advanced Care Planning form discussed at length and reviewed with patient.     I spent 16 minutes  with patient reviewing information  and documenting  in the chart.      Patient states she does want CPR. Reviewed medical interventions with patient and the differences between each: Comfort, Limited and Full. Patient opted for Full. Discussed the use of antibiotics at the end of life. She chose to use antibiotics consistent with treatment goals. Discussed artificially administered nutrition, patient is aware that if she is alert and oriented they can change their mind at any time. However, they have elected to have a trial of artificial nutrition by tube for undetermined length of time for a goal of good quality of life..   Additionally, she would want withdrawal of advanced care interventions including mechanical ventilation and artificial nutrition if there is little or no chance of recovery to good quality of life.    Patient has identified her spouse Angel Swan as her healthcare representative. Advised to discuss with healthcare representative if they can comply with wishes. Patient encouraged to have a meeting to discuss his decision regarding advanced care directives and goals of care with extended family and significant  friends  In regard to the POST form:The patient opted to complete POST while in the office and copy scanned into the chart. and advised to give to family members, place in an easily accessible place and take with them if going to the hospital or Emergency room.             Follow Up   Return in about 3 months (around 3/20/2025) for Recheck, diabetes with labs.  Patient was given instructions and counseling regarding her condition or for health maintenance advice. Please see specific information pulled into the AVS if appropriate.  Patient or patient representative verbalized consent for the use of Ambient Listening during the visit with  Spring Silverio MD for chart documentation. 12/22/2024  15:43 EST

## 2024-12-19 ENCOUNTER — HOSPITAL ENCOUNTER (OUTPATIENT)
Dept: PAIN MEDICINE | Facility: HOSPITAL | Age: 69
Discharge: HOME | End: 2024-12-19
Payer: MEDICARE

## 2024-12-19 VITALS
SYSTOLIC BLOOD PRESSURE: 101 MMHG | TEMPERATURE: 97.3 F | RESPIRATION RATE: 16 BRPM | OXYGEN SATURATION: 93 % | HEART RATE: 87 BPM | WEIGHT: 188 LBS | HEIGHT: 63 IN | BODY MASS INDEX: 33.31 KG/M2 | DIASTOLIC BLOOD PRESSURE: 69 MMHG

## 2024-12-19 DIAGNOSIS — G89.29 CHRONIC BILATERAL LOW BACK PAIN WITH BILATERAL SCIATICA: Primary | ICD-10-CM

## 2024-12-19 DIAGNOSIS — M54.42 CHRONIC BILATERAL LOW BACK PAIN WITH BILATERAL SCIATICA: Primary | ICD-10-CM

## 2024-12-19 DIAGNOSIS — M54.41 CHRONIC BILATERAL LOW BACK PAIN WITH BILATERAL SCIATICA: Primary | ICD-10-CM

## 2024-12-19 DIAGNOSIS — M47.16 LUMBAR SPONDYLOSIS WITH MYELOPATHY: ICD-10-CM

## 2024-12-19 DIAGNOSIS — R52 PAIN: ICD-10-CM

## 2024-12-19 PROCEDURE — 25010000002 BUPIVACAINE (PF) 0.25 % SOLUTION: Performed by: PHYSICAL MEDICINE & REHABILITATION

## 2024-12-19 PROCEDURE — 77003 FLUOROGUIDE FOR SPINE INJECT: CPT

## 2024-12-19 PROCEDURE — 25010000002 LIDOCAINE PF 1% 1 % SOLUTION: Performed by: PHYSICAL MEDICINE & REHABILITATION

## 2024-12-19 PROCEDURE — 25010000002 METHYLPREDNISOLONE PER 40 MG: Performed by: PHYSICAL MEDICINE & REHABILITATION

## 2024-12-19 RX ORDER — LIDOCAINE HYDROCHLORIDE 10 MG/ML
5 INJECTION, SOLUTION EPIDURAL; INFILTRATION; INTRACAUDAL; PERINEURAL ONCE
Status: COMPLETED | OUTPATIENT
Start: 2024-12-19 | End: 2024-12-19

## 2024-12-19 RX ORDER — BUPIVACAINE HYDROCHLORIDE 2.5 MG/ML
5 INJECTION, SOLUTION EPIDURAL; INFILTRATION; INTRACAUDAL ONCE
Status: COMPLETED | OUTPATIENT
Start: 2024-12-19 | End: 2024-12-19

## 2024-12-19 RX ORDER — METHYLPREDNISOLONE ACETATE 40 MG/ML
40 INJECTION, SUSPENSION INTRA-ARTICULAR; INTRALESIONAL; INTRAMUSCULAR; SOFT TISSUE ONCE
Status: COMPLETED | OUTPATIENT
Start: 2024-12-19 | End: 2024-12-19

## 2024-12-19 RX ADMIN — LIDOCAINE HYDROCHLORIDE 5 ML: 10 INJECTION, SOLUTION EPIDURAL; INFILTRATION; INTRACAUDAL; PERINEURAL at 15:32

## 2024-12-19 RX ADMIN — METHYLPREDNISOLONE ACETATE 40 MG: 40 INJECTION, SUSPENSION INTRA-ARTICULAR; INTRALESIONAL; INTRAMUSCULAR; INTRASYNOVIAL; SOFT TISSUE at 15:38

## 2024-12-19 RX ADMIN — BUPIVACAINE HYDROCHLORIDE 5 ML: 2.5 INJECTION, SOLUTION EPIDURAL; INFILTRATION; INTRACAUDAL; PERINEURAL at 15:38

## 2024-12-19 NOTE — PROCEDURES
Procedures    Lumbar Radiofrequency Ablation    Pre-Procedure Diagnosis: Lumbar Facet Syndrome    Post-Procedure Diagnosis: Lumbar Facet Syndrome    Description of Procedure: Right L2, L3, L4 medial branch radiofrequency ablation    Indications for Procedure: Lumbar Facet Disease    Anesthetic/Sedation: none         Description of Findings (include specimens removed, if any): Patient positioned   prone on fluoro table. After prepped w/ chloroprep and draped, a 20 G 100 mm   curved RF needle with 10 mm active tip directed under fluoro guidance to the right L2, L3, and L4 medial branch. Sensory and motor testing conducted at each level individually with sensation confined to the lower back. Each needle then anesthetized with Lidocaine 1% 1ml, followed by a 240 sec pause. Lateral images before and after injection of Lidocaine compared to ensure no needle migration. All 3 levels were lesioned at same time.    Level     Impedance    Sensory(50 Hz)        Motor (2Hz)   Lesion    L2      244          1.2v                           2.0v         80 degrees C/90 sec  L3      289          1.4v                           2.0v         80 degrees C/90 sec  L4            280                  1.0v                           2.0v         80 degrees C/90 sec    Injected 1 cc 1% Lidocaine at each level prior to lesioning followed by 240 second pause    Patient tolerated the procedure well. Vital signs remained stable throughout   the procedure. Sensorimotor exam of the right leg unchanged following the procedure.      Condition: Stable. Endorses good pain relief.

## 2024-12-19 NOTE — DISCHARGE INSTRUCTIONS
Radiofrequency Lesioning, Care After    Refer to this sheet in the next few weeks. These instructions provide you with information about caring for yourself after your procedure. Your health care provider may also give you more specific instructions. Your treatment has been planned according to current medical practices, but problems sometimes occur. Call your health care provider if you have any problems or questions after your procedure.  What can I expect after the procedure?  After the procedure, it is common to have:  Pain from the burned nerve.  You may feel a burning sensation for up to 1-2 weeks after the procedure  Temporary numbness at the site  Your leg/legs may be weak or feel numb after the procedure until the numbing medication wears off.  When you are up and walking, have assistance to prevent falling.     Follow these instructions at home:  Take over-the-counter and prescription medicines only as told by your health care provider.  Return to your normal activities as told by your health care provider. Ask your health care provider what activities are safe for you.  Pay close attention to how you feel after the procedure. If you start to have pain, write down when it hurts and how it feels. This will help you and your health care provider to know if you need an additional treatment.  Check your needle insertion site every day for signs of infection. Watch for:  Redness, swelling, or pain.  Fluid, blood, or pus.  Keep all follow-up visits as told by your health care provider. This is important.  No driving for 24 hrs-make sure you have full sensation in your legs prior to driving.  Avoid using heat on the injection site for 24 hours. You may use ice intermittently if needed by placing a               towel between your skin and the ice bag and using the ice for 20 minutes 2-3 times a day.  Do not take baths, swim or use a hot tub for 24 hours.  Leave your band-aids on for 24 hours and keep them  dry.    Contact a health care provider if:  Your pain does not get better.  You have redness, swelling, or pain at the needle insertion site.  You have fluid, blood, or pus coming from the needle insertion site.  You have a fever over 101 degrees.  You have new numb.ness in your arm or leg after the procedure    Get help right away if:  You develop sudden, severe pain.  You develop numbness or tingling near the procedure site that does not go away.  This information is not intended to replace advice given to you by your health care provider. Make sure you discuss any questions you have with your health care provider.  Document Released: 08/16/2012 Document Revised: 05/25/2017 Document Reviewed: 01/25/2016  [employer] Patient Education © 2019 [employer]

## 2024-12-20 ENCOUNTER — OFFICE VISIT (OUTPATIENT)
Dept: FAMILY MEDICINE CLINIC | Facility: CLINIC | Age: 69
End: 2024-12-20
Payer: MEDICARE

## 2024-12-20 ENCOUNTER — TELEPHONE (OUTPATIENT)
Dept: PAIN MEDICINE | Facility: HOSPITAL | Age: 69
End: 2024-12-20
Payer: MEDICARE

## 2024-12-20 VITALS
OXYGEN SATURATION: 94 % | HEIGHT: 63 IN | SYSTOLIC BLOOD PRESSURE: 132 MMHG | RESPIRATION RATE: 18 BRPM | WEIGHT: 188.2 LBS | HEART RATE: 100 BPM | TEMPERATURE: 97.3 F | BODY MASS INDEX: 33.35 KG/M2 | DIASTOLIC BLOOD PRESSURE: 90 MMHG

## 2024-12-20 DIAGNOSIS — Z87.898 HISTORY OF ULCER DISEASE: ICD-10-CM

## 2024-12-20 DIAGNOSIS — F17.200 CURRENT SMOKER: ICD-10-CM

## 2024-12-20 DIAGNOSIS — M51.370 DEGENERATION OF INTERVERTEBRAL DISC OF LUMBOSACRAL REGION WITH DISCOGENIC BACK PAIN: ICD-10-CM

## 2024-12-20 DIAGNOSIS — I10 ESSENTIAL HYPERTENSION: ICD-10-CM

## 2024-12-20 DIAGNOSIS — E66.811 CLASS 1 OBESITY DUE TO EXCESS CALORIES WITH SERIOUS COMORBIDITY AND BODY MASS INDEX (BMI) OF 33.0 TO 33.9 IN ADULT: ICD-10-CM

## 2024-12-20 DIAGNOSIS — E03.9 ACQUIRED HYPOTHYROIDISM: ICD-10-CM

## 2024-12-20 DIAGNOSIS — Z00.00 MEDICARE ANNUAL WELLNESS VISIT, SUBSEQUENT: Primary | ICD-10-CM

## 2024-12-20 DIAGNOSIS — E11.9 TYPE 2 DIABETES MELLITUS WITHOUT COMPLICATION, WITHOUT LONG-TERM CURRENT USE OF INSULIN: ICD-10-CM

## 2024-12-20 DIAGNOSIS — E66.09 CLASS 1 OBESITY DUE TO EXCESS CALORIES WITH SERIOUS COMORBIDITY AND BODY MASS INDEX (BMI) OF 33.0 TO 33.9 IN ADULT: ICD-10-CM

## 2024-12-20 DIAGNOSIS — E78.2 MIXED HYPERLIPIDEMIA: ICD-10-CM

## 2024-12-28 DIAGNOSIS — I10 PRIMARY HYPERTENSION: ICD-10-CM

## 2024-12-28 DIAGNOSIS — R00.2 PALPITATIONS: ICD-10-CM

## 2024-12-30 ENCOUNTER — TELEPHONE (OUTPATIENT)
Dept: FAMILY MEDICINE CLINIC | Facility: CLINIC | Age: 69
End: 2024-12-30
Payer: MEDICARE

## 2024-12-30 ENCOUNTER — OFFICE VISIT (OUTPATIENT)
Dept: ORTHOPEDIC SURGERY | Facility: CLINIC | Age: 69
End: 2024-12-30
Payer: MEDICARE

## 2024-12-30 VITALS — OXYGEN SATURATION: 95 % | WEIGHT: 188 LBS | HEIGHT: 63 IN | BODY MASS INDEX: 33.31 KG/M2

## 2024-12-30 DIAGNOSIS — M17.11 PRIMARY OSTEOARTHRITIS OF RIGHT KNEE: ICD-10-CM

## 2024-12-30 DIAGNOSIS — M17.12 PRIMARY OSTEOARTHRITIS OF LEFT KNEE: Primary | ICD-10-CM

## 2024-12-30 PROCEDURE — 99212 OFFICE O/P EST SF 10 MIN: CPT | Performed by: PHYSICIAN ASSISTANT

## 2024-12-30 PROCEDURE — 1160F RVW MEDS BY RX/DR IN RCRD: CPT | Performed by: PHYSICIAN ASSISTANT

## 2024-12-30 PROCEDURE — 1159F MED LIST DOCD IN RCRD: CPT | Performed by: PHYSICIAN ASSISTANT

## 2024-12-30 RX ORDER — METOPROLOL SUCCINATE 25 MG/1
25 TABLET, EXTENDED RELEASE ORAL DAILY
Qty: 90 TABLET | Refills: 0 | Status: SHIPPED | OUTPATIENT
Start: 2024-12-30

## 2024-12-30 NOTE — PROGRESS NOTES
"   Patient ID: Johanna Swan is a 69 y.o. female presents with her , nelson, for further follow up on bilateral knee Euflexxa HA. Reports good relief especially once she received lumbar injection & RFA in lumbar spine by Dr CHERRI Khan. Reports initial stiffness with walking after long periods of rest.  Overall she is pleased with the results that this Euflexxa HA has provided with daily activities.  However remains hesitant on returning to her normal walk regimen.    Objective:  Ht 158.8 cm (62.5\")   Wt 85.3 kg (188 lb)   LMP  (LMP Unknown)   SpO2 95%   BMI 33.84 kg/m²     Physical Examination:    Nonantalgic gait     Right knee:  Intact skin.  Mild warmth.  No effusion.  Tenderness along the medial joint line greater than lateral  Palpable Baker's cyst along the lateral aspect of the popliteal space  Extension 0 with pain, flexion 95+ with mild pain  Negative calf tenderness    Left knee:  Intact skin. Warmth. Trace effusion  Tenderness along the joint lines, lateral greater than medial  Extension 0 with pain, flexion 100+  Moderate laxity with varus and valgus stress  Negative calf tenderness    Imaging:   No new imaging    Assessment:    Diagnoses and all orders for this visit:    1. Primary osteoarthritis of left knee (Primary)    2. Primary osteoarthritis of right knee    Plan: Recommend continued HEP with increasing walking to 1 mile dedicated a few days a week. Follow up in 3  months for possible repeat HA, or steroid injection. All questions answered.      Disclaimer: Part of this note may be an electronic transcription/translation of spoken language to printed text using the Dragon Dictation System  "

## 2024-12-30 NOTE — TELEPHONE ENCOUNTER
"Received PA on Tirzepatide. Attempted to complete it but per covermymeds,  \"Available without authorization. The member is able to fill the requested drug at the pharmacy.\"  "

## 2024-12-31 DIAGNOSIS — G89.29 CHRONIC BILATERAL LOW BACK PAIN WITH BILATERAL SCIATICA: Chronic | ICD-10-CM

## 2024-12-31 DIAGNOSIS — M54.42 CHRONIC BILATERAL LOW BACK PAIN WITH BILATERAL SCIATICA: Chronic | ICD-10-CM

## 2024-12-31 DIAGNOSIS — M54.41 CHRONIC BILATERAL LOW BACK PAIN WITH BILATERAL SCIATICA: Chronic | ICD-10-CM

## 2025-01-02 ENCOUNTER — TELEPHONE (OUTPATIENT)
Dept: PAIN MEDICINE | Facility: CLINIC | Age: 70
End: 2025-01-02
Payer: MEDICARE

## 2025-01-02 DIAGNOSIS — M54.42 CHRONIC BILATERAL LOW BACK PAIN WITH BILATERAL SCIATICA: Chronic | ICD-10-CM

## 2025-01-02 DIAGNOSIS — G89.29 CHRONIC BILATERAL LOW BACK PAIN WITH BILATERAL SCIATICA: Chronic | ICD-10-CM

## 2025-01-02 DIAGNOSIS — M54.41 CHRONIC BILATERAL LOW BACK PAIN WITH BILATERAL SCIATICA: Chronic | ICD-10-CM

## 2025-01-02 NOTE — TELEPHONE ENCOUNTER
"    Caller: Johanna Swan \"Dennis\"    Relationship: Self    Best call back number: [phone]    Requested Prescriptions: NORCO 5-325 MG     Pharmacy where request should be sent:  [facility] [phone]    Last office visit with prescribing clinician: 9/5/2024     Next office visit with prescribing clinician: 1/10/2025     Additional details provided by patient: PATIENT IS OUT OF MEDICATION    Does the patient have less than a 3 day supply:  [x] Yes  [] No    Would you like a call back once the refill request has been completed: [x] Yes [] No    "

## 2025-01-03 RX ORDER — HYDROCODONE BITARTRATE AND ACETAMINOPHEN 5; 325 MG/1; MG/1
1 TABLET ORAL DAILY PRN
Qty: 30 TABLET | Refills: 0 | Status: CANCELLED | OUTPATIENT
Start: 2025-01-03

## 2025-01-03 RX ORDER — HYDROCODONE BITARTRATE AND ACETAMINOPHEN 5; 325 MG/1; MG/1
1 TABLET ORAL DAILY PRN
Qty: 30 TABLET | Refills: 0 | Status: SHIPPED | OUTPATIENT
Start: 2025-01-03

## 2025-01-09 DIAGNOSIS — G89.29 CHRONIC BILATERAL LOW BACK PAIN WITH BILATERAL SCIATICA: Chronic | ICD-10-CM

## 2025-01-09 DIAGNOSIS — M54.42 CHRONIC BILATERAL LOW BACK PAIN WITH BILATERAL SCIATICA: Chronic | ICD-10-CM

## 2025-01-09 DIAGNOSIS — M54.41 CHRONIC BILATERAL LOW BACK PAIN WITH BILATERAL SCIATICA: Chronic | ICD-10-CM

## 2025-01-09 RX ORDER — PREGABALIN 150 MG/1
150 CAPSULE ORAL 2 TIMES DAILY
Qty: 60 CAPSULE | Refills: 2 | Status: SHIPPED | OUTPATIENT
Start: 2025-01-09

## 2025-01-24 ENCOUNTER — OFFICE VISIT (OUTPATIENT)
Dept: PAIN MEDICINE | Facility: CLINIC | Age: 70
End: 2025-01-24
Payer: MEDICARE

## 2025-01-24 VITALS
WEIGHT: 185.9 LBS | HEART RATE: 88 BPM | OXYGEN SATURATION: 99 % | RESPIRATION RATE: 16 BRPM | DIASTOLIC BLOOD PRESSURE: 68 MMHG | BODY MASS INDEX: 33.46 KG/M2 | SYSTOLIC BLOOD PRESSURE: 105 MMHG

## 2025-01-24 DIAGNOSIS — M51.372 DEGENERATION OF INTERVERTEBRAL DISC OF LUMBOSACRAL REGION WITH DISCOGENIC BACK PAIN AND LOWER EXTREMITY PAIN: ICD-10-CM

## 2025-01-24 DIAGNOSIS — G25.81 RESTLESS LEG SYNDROME: ICD-10-CM

## 2025-01-24 DIAGNOSIS — M47.12 CERVICAL SPONDYLOSIS WITH MYELOPATHY: ICD-10-CM

## 2025-01-24 DIAGNOSIS — M54.6 CHRONIC BILATERAL THORACIC BACK PAIN: ICD-10-CM

## 2025-01-24 DIAGNOSIS — M25.561 CHRONIC PAIN OF BOTH KNEES: ICD-10-CM

## 2025-01-24 DIAGNOSIS — G89.29 CHRONIC BILATERAL THORACIC BACK PAIN: ICD-10-CM

## 2025-01-24 DIAGNOSIS — G89.29 CHRONIC BILATERAL LOW BACK PAIN WITH BILATERAL SCIATICA: Primary | Chronic | ICD-10-CM

## 2025-01-24 DIAGNOSIS — M25.562 CHRONIC PAIN OF BOTH KNEES: ICD-10-CM

## 2025-01-24 DIAGNOSIS — M54.41 CHRONIC BILATERAL LOW BACK PAIN WITH BILATERAL SCIATICA: Primary | Chronic | ICD-10-CM

## 2025-01-24 DIAGNOSIS — G89.29 CHRONIC PAIN OF BOTH KNEES: ICD-10-CM

## 2025-01-24 DIAGNOSIS — M54.2 CERVICALGIA: ICD-10-CM

## 2025-01-24 DIAGNOSIS — M46.1 SACROILIITIS: ICD-10-CM

## 2025-01-24 DIAGNOSIS — G43.809 OTHER MIGRAINE WITHOUT STATUS MIGRAINOSUS, NOT INTRACTABLE: ICD-10-CM

## 2025-01-24 DIAGNOSIS — M79.671 PAIN IN BOTH FEET: ICD-10-CM

## 2025-01-24 DIAGNOSIS — M79.672 PAIN IN BOTH FEET: ICD-10-CM

## 2025-01-24 DIAGNOSIS — M48.04 SPINAL STENOSIS OF THORACIC REGION: ICD-10-CM

## 2025-01-24 DIAGNOSIS — M47.16 LUMBAR SPONDYLOSIS WITH MYELOPATHY: ICD-10-CM

## 2025-01-24 DIAGNOSIS — M48.02 SPINAL STENOSIS OF CERVICAL REGION: ICD-10-CM

## 2025-01-24 DIAGNOSIS — M54.42 CHRONIC BILATERAL LOW BACK PAIN WITH BILATERAL SCIATICA: Primary | Chronic | ICD-10-CM

## 2025-01-24 DIAGNOSIS — M50.30 DEGENERATIVE DISC DISEASE, CERVICAL: ICD-10-CM

## 2025-01-24 DIAGNOSIS — M48.062 SPINAL STENOSIS OF LUMBAR REGION WITH NEUROGENIC CLAUDICATION: ICD-10-CM

## 2025-01-24 RX ORDER — HYDROCODONE BITARTRATE AND ACETAMINOPHEN 5; 325 MG/1; MG/1
1 TABLET ORAL DAILY PRN
Qty: 30 TABLET | Refills: 0 | Status: SHIPPED | OUTPATIENT
Start: 2025-01-24

## 2025-01-24 NOTE — PROGRESS NOTES
Subjective   Johanna Swan is a 69 y.o. female.     History of Present Illness  CC: low back pain    Chronic pain in neck, thoracic and lumbar spine, b/l knees, began > 10 years ago, treated by me since 8/8/24, 8/10 at worst, 3/10 at best, always present, varies, aching, sharp, worse with bending and lifting, interferes with ADLs, activity, failed PT, chiropractor, massage, acupuncture, meds. MRI shows DJD in C-, T-, L-spine with severe L3/4 stenosis. X-ray with severe b/l knee OA. Saw [facility], notes reviewed, good relief with cervical RFA, thoracic and lumbar MBB, ILESI, no red flags noted, taking Norco 5mg qdaily prn, Lyrica 150mg BID, Tizanidine 4mg TID prn, RxAlt #2 cream. Has strong FH of substance abuse. LBP > neck pain.   Back Pain  Associated symptoms include abdominal pain. Pertinent negatives include no bladder incontinence, chest pain, fever, numbness or weakness.   Neck Pain   Associated symptoms include trouble swallowing. Pertinent negatives include no chest pain, fever, numbness or weakness.        The following portions of the patient's history were reviewed and updated as appropriate: allergies, current medications, past family history, past medical history, past social history, past surgical history, and problem list.    Review of Systems   Constitutional:  Positive for fatigue. Negative for chills and fever.   HENT:  Positive for hearing loss and trouble swallowing.    Eyes:  Negative for visual disturbance.   Respiratory:  Negative for shortness of breath.    Cardiovascular:  Negative for chest pain.   Gastrointestinal:  Positive for abdominal pain, constipation and diarrhea. Negative for nausea and vomiting.   Genitourinary:  Negative for urinary incontinence.   Musculoskeletal:  Positive for arthralgias, back pain and neck pain. Negative for joint swelling and myalgias.   Neurological:  Positive for headache. Negative for dizziness, weakness and numbness.       Objective   Physical  Exam  Constitutional:       Appearance: Normal appearance. She is well-developed.   HENT:      Head: Normocephalic and atraumatic.   Eyes:      Pupils: Pupils are equal, round, and reactive to light.   Cardiovascular:      Rate and Rhythm: Normal rate and regular rhythm.      Heart sounds: Normal heart sounds.   Pulmonary:      Effort: Pulmonary effort is normal.      Breath sounds: Normal breath sounds.   Abdominal:      General: Bowel sounds are normal. There is no distension.      Palpations: Abdomen is soft.      Tenderness: There is no abdominal tenderness.   Musculoskeletal:         General: Tenderness present.      Cervical back: Normal range of motion.      Comments: TTP over b/l SIJ, with (+) b/l GAMALIEL, Gaenslens, thigh thrust     Neurological:      Mental Status: She is alert and oriented to person, place, and time.      Sensory: No sensory deficit.      Deep Tendon Reflexes: Reflexes are normal and symmetric. Reflexes normal.   Psychiatric:         Mood and Affect: Mood normal.         Behavior: Behavior normal.         Thought Content: Thought content normal.         Judgment: Judgment normal.         Assessment & Plan   Problems Addressed this Visit          Other    Chronic bilateral low back pain with bilateral sciatica - Primary (Chronic)    Spinal stenosis of cervical region    Degeneration of intervertebral disc of lumbosacral region with discogenic back pain and lower extremity pain    Degenerative disc disease, cervical    Chronic pain of both knees    Restless leg syndrome    Cervicalgia    Thoracic back pain    Migraine    Spinal stenosis of thoracic region    Cervical spondylosis with myelopathy    Lumbar spondylosis with myelopathy    Spinal stenosis of lumbar region with neurogenic claudication    Pain in both feet     Diagnoses         Codes Comments    Chronic bilateral low back pain with bilateral sciatica    -  Primary ICD-10-CM: M54.42, M54.41, G89.29  ICD-9-CM: 724.2, 724.3, 338.29      Cervicalgia     ICD-10-CM: M54.2  ICD-9-CM: 723.1     Cervical spondylosis with myelopathy     ICD-10-CM: M47.12  ICD-9-CM: 721.1     Chronic pain of both knees     ICD-10-CM: M25.561, M25.562, G89.29  ICD-9-CM: 719.46, 338.29     Degeneration of intervertebral disc of lumbosacral region with discogenic back pain and lower extremity pain     ICD-10-CM: M51.372  ICD-9-CM: 722.52     Degenerative disc disease, cervical     ICD-10-CM: M50.30  ICD-9-CM: 722.4     Lumbar spondylosis with myelopathy     ICD-10-CM: M47.16  ICD-9-CM: 721.42     Other migraine without status migrainosus, not intractable     ICD-10-CM: G43.809  ICD-9-CM: 346.80     Pain in both feet     ICD-10-CM: M79.671, M79.672  ICD-9-CM: 729.5     Restless leg syndrome     ICD-10-CM: G25.81  ICD-9-CM: 333.94     Spinal stenosis of cervical region     ICD-10-CM: M48.02  ICD-9-CM: 723.0     Spinal stenosis of lumbar region with neurogenic claudication     ICD-10-CM: M48.062  ICD-9-CM: 724.03     Spinal stenosis of thoracic region     ICD-10-CM: M48.04  ICD-9-CM: 724.01     Chronic bilateral thoracic back pain     ICD-10-CM: M54.6, G89.29  ICD-9-CM: 724.1, 338.29             UDS in order 8/8/24  Discussed risks and benefits of opioid treatment for chonic pain with patient, including expectations related to prescription requests, alternative modalities to opioids for managing pain, her treatment plan, risks of dependency and addiction, and safe storage practices for prescribed opioids, as well as proper and improper disposal of all medications.  Treatment plan will consist of continuing current medication as long as it remains effective and is necessary, while evaluating patient at each visit and determining if the medication can be lowered or discontinued, while also using nonopioid therapies to reduce reliance on opioids.  Began Norco 5mg QID prn this month, then qdaily prn, has FH of substance abuse. Last filled 1/3/25.  Patient's symptoms are still  adequately managed by current medication regimen, is doing well at this strength and dosage, therefore I will continue to prescribe unchanged as the most appropriate course of treatment.  Cont Lyrica 150mg BID  Cont Tizanidine 4mg TID prn  Cont RxAlt #2 cream  Plan cervical RFA when we receive records from [facility] showing specific levels.  Performed 2 b/l L3-5 MBB then RFA with nearly 100% relief so far.  X-ray C-spine and T-spine for probable RFA.  Schedule b/l SIJ injections.  RTC for procedures then in 3 months for f/u.    INSPECT REPORT     As part of the patient's treatment plan, I am prescribing controlled substances. The patient has been made aware of appropriate use of such medications, including potential risk of somnolence, limited ability to drive and/or work safely, and the potential for dependence or overdose. It has also bee made clear that these medications are for use by this patient only, without concomitant use of alcohol or other substances unless prescribed.      Patient has completed prescribing agreement detailing terms of continued prescribing of controlled substances, including monitoring INSPECT reports, urine drug screening, and pill counts if necessary. The patient is aware that inappropriate use will results in cessation of prescribing such medications.     INSPECT report has been reviewed and scanned into the patient's chart.     As the clinician, I personally reviewed the INSPECT while the patient was in the office today.     History and physical exam exhibit continued safe and appropriate use of controlled substances.

## 2025-02-03 ENCOUNTER — TELEPHONE (OUTPATIENT)
Dept: PAIN MEDICINE | Facility: CLINIC | Age: 70
End: 2025-02-03
Payer: MEDICARE

## 2025-02-03 NOTE — TELEPHONE ENCOUNTER
"    Caller: Johanna Swan \"Dennis\"    Relationship to patient: Self    Best call back number:     Chief complaint: BRIANNA SIJ     Type of visit: BRIANNA SIJ INJECTION    Requested date: ASAP    If rescheduling, when is the original appointment: 2/6/25     Additional notes: PT HAS A UTI AND WILL BE STARTING ANTIBIOTICS TODAY  "

## 2025-02-20 ENCOUNTER — HOSPITAL ENCOUNTER (OUTPATIENT)
Dept: PAIN MEDICINE | Facility: HOSPITAL | Age: 70
Discharge: HOME | End: 2025-02-20
Payer: MEDICARE

## 2025-02-20 VITALS
HEIGHT: 63 IN | HEART RATE: 86 BPM | WEIGHT: 185 LBS | SYSTOLIC BLOOD PRESSURE: 124 MMHG | DIASTOLIC BLOOD PRESSURE: 83 MMHG | RESPIRATION RATE: 16 BRPM | TEMPERATURE: 97.3 F | OXYGEN SATURATION: 99 % | BODY MASS INDEX: 32.78 KG/M2

## 2025-02-20 DIAGNOSIS — M46.1 SACROILIITIS: Primary | ICD-10-CM

## 2025-02-20 DIAGNOSIS — R52 PAIN: ICD-10-CM

## 2025-02-20 PROCEDURE — 25010000002 BUPIVACAINE (PF) 0.25 % SOLUTION: Performed by: PHYSICAL MEDICINE & REHABILITATION

## 2025-02-20 PROCEDURE — 25510000001 IOPAMIDOL 41 % SOLUTION: Performed by: PHYSICAL MEDICINE & REHABILITATION

## 2025-02-20 PROCEDURE — 77003 FLUOROGUIDE FOR SPINE INJECT: CPT

## 2025-02-20 PROCEDURE — 25010000002 METHYLPREDNISOLONE PER 40 MG: Performed by: PHYSICAL MEDICINE & REHABILITATION

## 2025-02-20 RX ORDER — IOPAMIDOL 408 MG/ML
10 INJECTION, SOLUTION INTRATHECAL
Status: COMPLETED | OUTPATIENT
Start: 2025-02-20 | End: 2025-02-20

## 2025-02-20 RX ORDER — METHYLPREDNISOLONE ACETATE 40 MG/ML
40 INJECTION, SUSPENSION INTRA-ARTICULAR; INTRALESIONAL; INTRAMUSCULAR; SOFT TISSUE ONCE
Status: COMPLETED | OUTPATIENT
Start: 2025-02-20 | End: 2025-02-20

## 2025-02-20 RX ORDER — BUPIVACAINE HYDROCHLORIDE 2.5 MG/ML
5 INJECTION, SOLUTION EPIDURAL; INFILTRATION; INTRACAUDAL ONCE
Status: COMPLETED | OUTPATIENT
Start: 2025-02-20 | End: 2025-02-20

## 2025-02-20 RX ADMIN — IOPAMIDOL 10 ML: 408 INJECTION, SOLUTION INTRATHECAL at 13:46

## 2025-02-20 RX ADMIN — METHYLPREDNISOLONE ACETATE 40 MG: 40 INJECTION, SUSPENSION INTRA-ARTICULAR; INTRALESIONAL; INTRAMUSCULAR; INTRASYNOVIAL; SOFT TISSUE at 13:47

## 2025-02-20 RX ADMIN — BUPIVACAINE HYDROCHLORIDE 5 ML: 2.5 INJECTION, SOLUTION EPIDURAL; INFILTRATION; INTRACAUDAL; PERINEURAL at 13:47

## 2025-02-20 RX ADMIN — METHYLPREDNISOLONE ACETATE 40 MG: 40 INJECTION, SUSPENSION INTRA-ARTICULAR; INTRALESIONAL; INTRAMUSCULAR; INTRASYNOVIAL; SOFT TISSUE at 13:48

## 2025-02-20 NOTE — PROCEDURES
Procedures    Sacroiliac Joint Injection    PREOPERATIVE DIAGNOSIS: Sacroilitis    POSTOPERATIVE DIAGNOSIS: Sacroilitis    PROCEDURE PERFORMED:  BILATERAL SACROILIAC JOINT INJECTION    The patient understands the risks and benefits of the procedure and wishes to proceed. The patient was seen in the preoperative area. Patient's consent was obtained and updated. Vitals were taken. Patient was then brought to the procedure suite and placed in prone position for a sacroiliac joint injection. The appropriate anatomic area was widely prepped with Chloraprep and draped in a sterile fashion. Under fluoroscopic guidance using a contralateral oblique view, a 25 gauge curved tip spinal needle was passed through skin anesthetized with 1% Lidocaine without epinephrine. The needle tip was guided to the lower pole of the joint using fluoroscopy. 1mL of preservative free contrast was injected into the joint to confirm location. A clear outline was obtained and 3 mL of steroid solution containing 2 mL 0.25% bupivacaine, and 1mL 40mg Depomedrol was injected in total.  The procedure was repeated in all respects on the opposite side. The patient tolerated with no josias-procedural complications.  A sterile dressing was placed over the puncture sites.

## 2025-02-21 ENCOUNTER — TELEPHONE (OUTPATIENT)
Dept: PAIN MEDICINE | Facility: HOSPITAL | Age: 70
End: 2025-02-21
Payer: MEDICARE

## 2025-03-07 ENCOUNTER — OFFICE (AMBULATORY)
Dept: URBAN - METROPOLITAN AREA CLINIC 64 | Facility: CLINIC | Age: 70
End: 2025-03-07
Payer: MEDICARE

## 2025-03-07 VITALS
HEART RATE: 91 BPM | SYSTOLIC BLOOD PRESSURE: 90 MMHG | HEIGHT: 63 IN | WEIGHT: 180 LBS | DIASTOLIC BLOOD PRESSURE: 56 MMHG

## 2025-03-07 DIAGNOSIS — K21.9 GASTRO-ESOPHAGEAL REFLUX DISEASE WITHOUT ESOPHAGITIS: ICD-10-CM

## 2025-03-07 DIAGNOSIS — K59.00 CONSTIPATION, UNSPECIFIED: ICD-10-CM

## 2025-03-07 PROCEDURE — 99214 OFFICE O/P EST MOD 30 MIN: CPT | Performed by: INTERNAL MEDICINE

## 2025-03-07 RX ORDER — LINACLOTIDE 290 UG/1
290 CAPSULE, GELATIN COATED ORAL
Qty: 90 | Refills: 3 | Status: ACTIVE
Start: 2025-03-07

## 2025-03-10 NOTE — TELEPHONE ENCOUNTER
"Caller: Johanna Swan \"Dennis\"    Relationship: Self    Best call back number:     Requested Prescriptions:   tiZANidine (ZANAFLEX) 4 MG tablet     Pharmacy where request should be sent:  [id number] - [place] [address] - [phone]  - [phone] [address]  [address]  [place] [address]  Phone: [phone]  Fax: [phone]     Last office visit with prescribing clinician: 1/24/2025   Last telemedicine visit with prescribing clinician: Visit date not found   Next office visit with prescribing clinician: 4/22/2025     Additional details provided by patient: PHARMACY TOLD HER WE DENIED IT FOR SOME REASON.  PATIENT IS OUT AND AT PHARMACY     Does the patient have less than a 3 day supply:  [x] Yes  [] No    Would you like a call back once the refill request has been completed: [] Yes [] No    If the office needs to give you a call back, can they leave a voicemail: [] Yes [] No    Blaise Bello Rep   03/10/25 16:04 EDT         "

## 2025-03-15 DIAGNOSIS — E11.9 TYPE 2 DIABETES MELLITUS WITHOUT COMPLICATION, WITHOUT LONG-TERM CURRENT USE OF INSULIN: ICD-10-CM

## 2025-03-17 ENCOUNTER — TELEPHONE (OUTPATIENT)
Dept: FAMILY MEDICINE CLINIC | Facility: CLINIC | Age: 70
End: 2025-03-17
Payer: MEDICARE

## 2025-03-17 DIAGNOSIS — E11.9 TYPE 2 DIABETES MELLITUS WITHOUT COMPLICATION, WITHOUT LONG-TERM CURRENT USE OF INSULIN: ICD-10-CM

## 2025-03-17 RX ORDER — TIRZEPATIDE 10 MG/.5ML
INJECTION, SOLUTION SUBCUTANEOUS
OUTPATIENT
Start: 2025-03-17

## 2025-03-17 NOTE — TELEPHONE ENCOUNTER
"    Caller: Johanna Swan \"Dennis\"    Relationship: Self    Best call back number: [phone]    What medication are you requesting: Tirzepatide 10.0 MG      Have you had these symptoms before:    [x] Yes  [] No    Have you been treated for these symptoms before:   [x] Yes  [] No    If a prescription is needed, what is your preferred pharmacy and phone number: [id number] - [place], [place] - [address] [phone] [facility] [phone] FX     Additional notes:      HAS 3 SHOTS LEFT OF THE 10 MG, IS AFRAID THE PHARMACY WILL NOT HAVE IT BY THE REFILL TIME . PLEASE ADVISE     "

## 2025-03-18 RX ORDER — TIRZEPATIDE 10 MG/.5ML
1 INJECTION, SOLUTION SUBCUTANEOUS WEEKLY
Qty: 2 ML | Refills: 1 | Status: SHIPPED | OUTPATIENT
Start: 2025-03-18

## 2025-03-18 NOTE — TELEPHONE ENCOUNTER
I have sent in prescription for Mounjuro 10 mg.  The drug companies have stated there is no longer a shortage, should not be an issue going forward.

## 2025-03-18 NOTE — TELEPHONE ENCOUNTER
Pharmacy requesting script be resent and stating that the Tirzepatide is Mounjaro and not Zepbound in the comments to pharmacy.

## 2025-03-26 DIAGNOSIS — E03.9 ACQUIRED HYPOTHYROIDISM: ICD-10-CM

## 2025-03-26 RX ORDER — LEVOTHYROXINE SODIUM 88 MCG
88 TABLET ORAL DAILY
Qty: 90 TABLET | Refills: 1 | Status: SHIPPED | OUTPATIENT
Start: 2025-03-26

## 2025-04-18 ENCOUNTER — OFFICE VISIT (OUTPATIENT)
Dept: PAIN MEDICINE | Facility: CLINIC | Age: 70
End: 2025-04-18
Payer: MEDICARE

## 2025-04-18 VITALS
OXYGEN SATURATION: 97 % | BODY MASS INDEX: 33.12 KG/M2 | DIASTOLIC BLOOD PRESSURE: 70 MMHG | SYSTOLIC BLOOD PRESSURE: 96 MMHG | WEIGHT: 184 LBS | RESPIRATION RATE: 16 BRPM | HEART RATE: 84 BPM

## 2025-04-18 DIAGNOSIS — G43.809 OTHER MIGRAINE WITHOUT STATUS MIGRAINOSUS, NOT INTRACTABLE: ICD-10-CM

## 2025-04-18 DIAGNOSIS — M54.41 CHRONIC BILATERAL LOW BACK PAIN WITH BILATERAL SCIATICA: Primary | Chronic | ICD-10-CM

## 2025-04-18 DIAGNOSIS — G89.29 CHRONIC PAIN OF BOTH KNEES: ICD-10-CM

## 2025-04-18 DIAGNOSIS — M79.671 PAIN IN BOTH FEET: ICD-10-CM

## 2025-04-18 DIAGNOSIS — M54.2 CERVICALGIA: ICD-10-CM

## 2025-04-18 DIAGNOSIS — M46.1 SACROILIITIS: ICD-10-CM

## 2025-04-18 DIAGNOSIS — M48.062 SPINAL STENOSIS OF LUMBAR REGION WITH NEUROGENIC CLAUDICATION: ICD-10-CM

## 2025-04-18 DIAGNOSIS — M54.42 CHRONIC BILATERAL LOW BACK PAIN WITH BILATERAL SCIATICA: Primary | Chronic | ICD-10-CM

## 2025-04-18 DIAGNOSIS — M48.02 SPINAL STENOSIS OF CERVICAL REGION: ICD-10-CM

## 2025-04-18 DIAGNOSIS — G89.29 CHRONIC BILATERAL THORACIC BACK PAIN: ICD-10-CM

## 2025-04-18 DIAGNOSIS — M25.561 CHRONIC PAIN OF BOTH KNEES: ICD-10-CM

## 2025-04-18 DIAGNOSIS — M79.672 PAIN IN BOTH FEET: ICD-10-CM

## 2025-04-18 DIAGNOSIS — G89.29 CHRONIC BILATERAL LOW BACK PAIN WITH BILATERAL SCIATICA: Primary | Chronic | ICD-10-CM

## 2025-04-18 DIAGNOSIS — M51.372 DEGENERATION OF INTERVERTEBRAL DISC OF LUMBOSACRAL REGION WITH DISCOGENIC BACK PAIN AND LOWER EXTREMITY PAIN: ICD-10-CM

## 2025-04-18 DIAGNOSIS — M47.16 LUMBAR SPONDYLOSIS WITH MYELOPATHY: ICD-10-CM

## 2025-04-18 DIAGNOSIS — M50.30 DEGENERATIVE DISC DISEASE, CERVICAL: ICD-10-CM

## 2025-04-18 DIAGNOSIS — M25.562 CHRONIC PAIN OF BOTH KNEES: ICD-10-CM

## 2025-04-18 DIAGNOSIS — M54.6 CHRONIC BILATERAL THORACIC BACK PAIN: ICD-10-CM

## 2025-04-18 DIAGNOSIS — M48.04 SPINAL STENOSIS OF THORACIC REGION: ICD-10-CM

## 2025-04-18 DIAGNOSIS — G25.81 RESTLESS LEG SYNDROME: ICD-10-CM

## 2025-04-18 DIAGNOSIS — M47.12 CERVICAL SPONDYLOSIS WITH MYELOPATHY: ICD-10-CM

## 2025-04-18 RX ORDER — HYDROCODONE BITARTRATE AND ACETAMINOPHEN 5; 325 MG/1; MG/1
1 TABLET ORAL DAILY PRN
Qty: 30 TABLET | Refills: 0 | Status: SHIPPED | OUTPATIENT
Start: 2025-04-18

## 2025-04-18 NOTE — PROGRESS NOTES
Subjective   Johanna Swan is a 69 y.o. female.     History of Present Illness  CC: low back pain    Chronic pain in neck, thoracic and lumbar spine, b/l knees, began > 10 years ago, treated by me since 8/8/24, 8/10 at worst, 3/10 at best, always present, varies, aching, sharp, worse with bending and lifting, interferes with ADLs, activity, failed PT, chiropractor, massage, acupuncture, meds. MRI shows DJD in C-, T-, L-spine with severe L3/4 stenosis. X-ray with severe b/l knee OA. Saw [facility], notes reviewed, good relief with cervical RFA, thoracic and lumbar MBB, ILESI, no red flags noted, taking Norco 5mg qdaily prn, Lyrica 150mg BID, Tizanidine 4mg TID prn, RxAlt #2 cream. Has strong FH of substance abuse. LBP > neck pain.   Back Pain  Associated symptoms include abdominal pain. Pertinent negatives include no bladder incontinence, chest pain, fever, numbness or weakness.   Neck Pain   Associated symptoms include trouble swallowing. Pertinent negatives include no chest pain, fever, numbness or weakness.        The following portions of the patient's history were reviewed and updated as appropriate: allergies, current medications, past family history, past medical history, past social history, past surgical history, and problem list.    Review of Systems   Constitutional:  Positive for fatigue. Negative for chills and fever.   HENT:  Positive for hearing loss and trouble swallowing.    Eyes:  Negative for visual disturbance.   Respiratory:  Negative for shortness of breath.    Cardiovascular:  Negative for chest pain.   Gastrointestinal:  Positive for abdominal pain, constipation and diarrhea. Negative for nausea and vomiting.   Genitourinary:  Negative for urinary incontinence.   Musculoskeletal:  Positive for arthralgias, back pain and neck pain. Negative for joint swelling and myalgias.   Neurological:  Positive for headache. Negative for dizziness, weakness and numbness.       Objective   Physical  Exam  Constitutional:       Appearance: Normal appearance. She is well-developed.   HENT:      Head: Normocephalic and atraumatic.   Eyes:      Pupils: Pupils are equal, round, and reactive to light.   Cardiovascular:      Rate and Rhythm: Normal rate and regular rhythm.      Heart sounds: Normal heart sounds.   Pulmonary:      Effort: Pulmonary effort is normal.      Breath sounds: Normal breath sounds.   Abdominal:      General: Bowel sounds are normal. There is no distension.      Palpations: Abdomen is soft.      Tenderness: There is no abdominal tenderness.   Musculoskeletal:         General: Tenderness present.      Cervical back: Normal range of motion.      Comments: TTP over b/l SIJ, with (+) b/l GAMALIEL, Gaenslens, thigh thrust     Neurological:      Mental Status: She is alert and oriented to person, place, and time.      Sensory: No sensory deficit.      Deep Tendon Reflexes: Reflexes are normal and symmetric. Reflexes normal.   Psychiatric:         Mood and Affect: Mood normal.         Behavior: Behavior normal.         Thought Content: Thought content normal.         Judgment: Judgment normal.           Assessment & Plan   Problems Addressed this Visit          Other    Chronic bilateral low back pain with bilateral sciatica - Primary (Chronic)    Spinal stenosis of cervical region    Degeneration of intervertebral disc of lumbosacral region with discogenic back pain and lower extremity pain    Degenerative disc disease, cervical    Chronic pain of both knees    Restless leg syndrome    Cervicalgia    Thoracic back pain    Migraine    Spinal stenosis of thoracic region    Cervical spondylosis with myelopathy    Lumbar spondylosis with myelopathy    Spinal stenosis of lumbar region with neurogenic claudication    Pain in both feet    Sacroiliitis     Diagnoses         Codes Comments      Chronic bilateral low back pain with bilateral sciatica    -  Primary ICD-10-CM: M54.42, M54.41, G89.29  ICD-9-CM:  724.2, 724.3, 338.29       Cervicalgia     ICD-10-CM: M54.2  ICD-9-CM: 723.1       Cervical spondylosis with myelopathy     ICD-10-CM: M47.12  ICD-9-CM: 721.1       Chronic pain of both knees     ICD-10-CM: M25.561, M25.562, G89.29  ICD-9-CM: 719.46, 338.29       Degeneration of intervertebral disc of lumbosacral region with discogenic back pain and lower extremity pain     ICD-10-CM: M51.372  ICD-9-CM: 722.52       Degenerative disc disease, cervical     ICD-10-CM: M50.30  ICD-9-CM: 722.4       Lumbar spondylosis with myelopathy     ICD-10-CM: M47.16  ICD-9-CM: 721.42       Other migraine without status migrainosus, not intractable     ICD-10-CM: G43.809  ICD-9-CM: 346.80       Pain in both feet     ICD-10-CM: M79.671, M79.672  ICD-9-CM: 729.5       Restless leg syndrome     ICD-10-CM: G25.81  ICD-9-CM: 333.94       Sacroiliitis     ICD-10-CM: M46.1  ICD-9-CM: 720.2       Spinal stenosis of cervical region     ICD-10-CM: M48.02  ICD-9-CM: 723.0       Spinal stenosis of lumbar region with neurogenic claudication     ICD-10-CM: M48.062  ICD-9-CM: 724.03       Spinal stenosis of thoracic region     ICD-10-CM: M48.04  ICD-9-CM: 724.01       Chronic bilateral thoracic back pain     ICD-10-CM: M54.6, G89.29  ICD-9-CM: 724.1, 338.29             UDS in order 8/8/24  Discussed risks and benefits of opioid treatment for chonic pain with patient, including expectations related to prescription requests, alternative modalities to opioids for managing pain, her treatment plan, risks of dependency and addiction, and safe storage practices for prescribed opioids, as well as proper and improper disposal of all medications.  Treatment plan will consist of continuing current medication as long as it remains effective and is necessary, while evaluating patient at each visit and determining if the medication can be lowered or discontinued, while also using nonopioid therapies to reduce reliance on opioids.  Began Norco 5mg QID prn this  month, then qdaily prn, has FH of substance abuse. Last filled 4/7/25.  Patient's symptoms are still adequately managed by current medication regimen, is doing well at this strength and dosage, therefore I will continue to prescribe unchanged as the most appropriate course of treatment.  Cont Lyrica 150mg BID  Cont Tizanidine 4mg TID prn  Cont RxAlt #2 cream  Plan cervical RFA when we receive records from [facility] showing specific levels.  Performed 2 b/l L3-5 MBB then RFA with nearly 100% relief so far.  X-ray C-spine and T-spine for probable RFA.  Performed b/l SIJ injections with essentially 100% relief.  May need R GTB injection in future.  RTC in 3 months for f/u.    INSPECT REPORT     As part of the patient's treatment plan, I am prescribing controlled substances. The patient has been made aware of appropriate use of such medications, including potential risk of somnolence, limited ability to drive and/or work safely, and the potential for dependence or overdose. It has also bee made clear that these medications are for use by this patient only, without concomitant use of alcohol or other substances unless prescribed.      Patient has completed prescribing agreement detailing terms of continued prescribing of controlled substances, including monitoring INSPECT reports, urine drug screening, and pill counts if necessary. The patient is aware that inappropriate use will results in cessation of prescribing such medications.     INSPECT report has been reviewed and scanned into the patient's chart.     As the clinician, I personally reviewed the INSPECT while the patient was in the office today.     History and physical exam exhibit continued safe and appropriate use of controlled substances.

## 2025-04-21 DIAGNOSIS — M54.42 CHRONIC BILATERAL LOW BACK PAIN WITH BILATERAL SCIATICA: Chronic | ICD-10-CM

## 2025-04-21 DIAGNOSIS — G89.29 CHRONIC BILATERAL LOW BACK PAIN WITH BILATERAL SCIATICA: Chronic | ICD-10-CM

## 2025-04-21 DIAGNOSIS — M54.41 CHRONIC BILATERAL LOW BACK PAIN WITH BILATERAL SCIATICA: Chronic | ICD-10-CM

## 2025-04-21 RX ORDER — PREGABALIN 150 MG/1
150 CAPSULE ORAL 2 TIMES DAILY
Qty: 60 CAPSULE | Refills: 3 | Status: SHIPPED | OUTPATIENT
Start: 2025-04-21

## 2025-04-22 ENCOUNTER — OFFICE VISIT (OUTPATIENT)
Dept: ORTHOPEDIC SURGERY | Facility: CLINIC | Age: 70
End: 2025-04-22
Payer: MEDICARE

## 2025-04-22 VITALS — WEIGHT: 184 LBS | OXYGEN SATURATION: 96 % | BODY MASS INDEX: 32.6 KG/M2 | HEIGHT: 63 IN

## 2025-04-22 DIAGNOSIS — M17.12 PRIMARY OSTEOARTHRITIS OF LEFT KNEE: Primary | ICD-10-CM

## 2025-04-22 DIAGNOSIS — M17.11 PRIMARY OSTEOARTHRITIS OF RIGHT KNEE: ICD-10-CM

## 2025-04-22 NOTE — PROGRESS NOTES
"   Patient ID: Johanna Swan is a 69 y.o. female  History of Present Illness  The patient presents for further follow-up on bilateral knee pain secondary to known osteoarthritis and bilateral knee Euflexxa hyaluronic acid injections that she last received on 09/30/2024.    Currently, she is more than 7 months out from her last Euflexxa hyaluronic acid injections. Pain management has been maintained with [place] 5/325. She reports having recently undergone radiofrequency ablation (RFA) in her back, which has provided some relief. Additionally, she has been applying a topical analgesic cream to her knees but notes that it has changed color and is causing discomfort.    Diabetes is managed, with the last A1c recorded as 6.0 on 04/18/2025.    SOCIAL HISTORY  Diet: Consumes a lot of chocolate.    Objective:  Ht 158.8 cm (62.5\")   Wt 83.5 kg (184 lb)   LMP  (LMP Unknown)   SpO2 96%   BMI 33.12 kg/m²     Physical Examination:     Bilateral knees:  Physical Exam  Musculoskeletal:  Right Hip:  - Grossly intact range of motion.  Left Hip:  - Grossly intact range of motion.  Right Knee:  - Extension is zero with pain.  - Flexion is greater than 95 with pain.  - Trace effusion.  - Warmth and tenderness over the medial joint line.  - Mild laxity with varus stress, but not with valgus stress.  - Positive retropatellar crepitus.  - No tenderness in the calf.  Left Knee:  - Extension is zero.  - Flexion is 120 plus.  - Mild tenderness along the medial joint line.  - Positive retropatellar crepitus.  - Mild warmth.  - No apparent effusion.  - No tenderness in the calf.  Right Ankle:  - Grossly intact range of motion.  Left Ankle:  - Grossly intact range of motion.      Imaging:       Assessment:    Diagnoses and all orders for this visit:    1. Primary osteoarthritis of left knee (Primary)  -     [facility] Treatment; Future    2. Primary osteoarthritis of right knee  -     [facility] Treatment; Future    Plan:   Assessment & Plan  1. " Bilateral knee pain:  Euflexxa hyaluronic acid injections last received on 09/30/2024. Physical exam findings: right knee extension 0 with pain, flexion >95 with pain, trace effusion, warmth, tenderness over medial joint line, mild laxity with valgus stress, positive retropatellar crepitus; left knee extension 0, flexion >120 with mild pain, mild tenderness along medial joint line, mild warmth, positive retropatellar crepitus, no apparent effusion.    A repeat order for Euflexxa hyaluronic acid injections will be placed. Irma Nguyen will contact to inform of the insurance's preferred brand, and the treatment will proceed accordingly. Pain cream application to the knees will be continued, and a new prescription will be provided if needed.    2. Diabetes mellitus:  A1c 6.0 on 04/18/2025.    Continue current diabetes management plan.    Follow-up: Irma Rao will call to discuss the preferred brand for Euflexxa injections.         Patient or patient representative verbalized consent for the use of Ambient Listening during the visit with  Eugenio Duffy PA-C for chart documentation. 4/22/2025  13:58 EDT  Disclaimer: Part of this note may be an electronic transcription/translation of spoken language to printed text using the Dragon Dictation System

## 2025-04-23 DIAGNOSIS — E78.2 MIXED HYPERLIPIDEMIA: ICD-10-CM

## 2025-04-23 RX ORDER — ROSUVASTATIN CALCIUM 5 MG/1
5 TABLET, COATED ORAL DAILY
Qty: 90 TABLET | Refills: 1 | Status: SHIPPED | OUTPATIENT
Start: 2025-04-23

## 2025-05-05 DIAGNOSIS — R00.2 PALPITATIONS: ICD-10-CM

## 2025-05-05 DIAGNOSIS — I10 PRIMARY HYPERTENSION: ICD-10-CM

## 2025-05-05 RX ORDER — METOPROLOL SUCCINATE 25 MG/1
25 TABLET, EXTENDED RELEASE ORAL DAILY
Qty: 90 TABLET | Refills: 0 | Status: SHIPPED | OUTPATIENT
Start: 2025-05-05 | End: 2025-05-06 | Stop reason: SDUPTHER

## 2025-05-05 NOTE — PROGRESS NOTES
Chief Complaint  Diabetes, Hypertension, Hyperlipidemia, Hypothyroidism, Depression, and Anxiety    History of Present Illness  Johanna Swan presents today for follow up on diabetes, hypertension, hyperlipidemia, hypothyroidism, depression, and anxiety    Diabetes  Patient does check blood sugar at home 1 times daily.  Per patient fasting blood sugars range around 100.  Associated symptoms include Blurred vision and Hunger.  Per patient current diet is Well Balanced and Variety of foods.  Patient does not avoid concentrated sweets.  Patient does see podiatry.  Patient see's [facility] for diabetic eye exam and last eye exam was about 6 months ago.  Patient reports they are taking medications as prescribed and they are not having side effects.  Last A1c was 6.0 on 4/18/25.     Diabetic Foot Exam Performed    Hypertension  Patient does not check blood pressure at home.   Patient denies  blurred vision, chest pain, dyspnea, headache, neck aches, orthopnea, palpitations, paroxysmal nocturnal dyspnea, peripheral edema, pulsating in the ears, and tiredness/fatigue   Patient reports they are taking medications as prescribed and they are not having side effects.    Hyperlipidemia  Patient is following a low cholesterol diet.   Currently is on statin therapy.  Patient reports is not exercising.  Patient reports they are taking medications as prescribed and they are not having side effects.    Hypothyroidism  Patient presents for evaluation of thyroid function.   Symptoms consist of denies fatigue, weight changes, heat/cold intolerance, bowel/skin changes or CVS symptoms.  The problem has been stable.    Patient reports they are taking medications as prescribed and they are not having side effects.     Anxiety/Depression  Associated symptoms include: depressed mood, fatigue, difficulty concentrating, anxiety, and increased appetite   Severity is described per patient :  moderate to severe.  Patient reports they are taking  medications as prescribed and they are not having side effects.    PHQ-9 Depression Screening  Little interest or pleasure in doing things? More than half the days   Feeling down, depressed, or hopeless? More than half the days   PHQ-2 Total Score 4   Trouble falling or staying asleep, or sleeping too much? Nearly every day   Feeling tired or having little energy? Nearly every day   Poor appetite or overeating? Nearly every day   Feeling bad about yourself - or that you are a failure or have let yourself or your family down? Nearly every day   Trouble concentrating on things, such as reading the newspaper or watching television? More than half the days   Moving or speaking so slowly that other people could have noticed? Or the opposite - being so fidgety or restless that you have been moving around a lot more than usual? Not at all     Thoughts that you would be better off dead, or of hurting yourself in some way? Not at all   PHQ-9 Total Score 18   If you checked off any problems, how difficult have these problems made it for you to do your work, take care of things at home, or get along with other people? Extremely difficult          History of Present Illness  The patient is a 69-year-old female scheduled for a follow-up on chronic medical conditions, including diabetes, hypothyroidism, and hyperlipidemia. She had her labs done two weeks ago, and the results were reviewed with her today.    She reports experiencing depression, which she attributes to familial issues, particularly an incident involving her son and granddaughter. This situation has led to a strained relationship with her granddaughter, who has been distant. She also mentions financial difficulties, preventing her from engaging in activities such as vacations or personal grooming (getting her hair and nails done), which she believes contribute to her depressive state. She describes a lack of motivation and energy, often spending her days in bed or  on the couch, unable to participate in household chores. Despite these symptoms, she maintains personal hygiene practices such as daily showers and changing clothes. She has not consulted a psychiatrist in several years. She has experienced significant loss, with the deaths of her mother, sister, and brother between 2020 and the present. She reports feeling anxious at night, particularly in the house where her 's parents passed away, but does not believe she has night terrors. She occasionally struggles with sleep onset, which she attributes to caffeine intake. She has not previously tried additional antidepressants. She reports no suicidal ideation. She has been self-medicating with chocolate and ice cream, which she believes may be contributing to her weight gain. Her sleep pattern is irregular, typically waking up at 1:00 PM and going to bed at 4:00 AM. She has been feeling slightly better recently, attributing this to getting her hair cut and nails done, and attempting to assist more with household tasks. She is currently on Cymbalta, prescribed by Dr. Monique, a neurologist who also manages her migraines. She was previously diagnosed with PTSD and clinical depression by Ritu Silva, who she saw only once due to insurance noncoverage. She has a history of hallucinations, such as seeing mice, which have been ongoing for years. She was previously prescribed Paxil, which she found beneficial but not necessarily more so than current Cymbalta.    She reports no recent weight loss despite being on Mounjaro 10 mg. She has not tried higher doses and is uncertain if she has been on the current dose for an extended period. She recently started Linzess and is experiencing watery bowel movements, which she believes may be a side effect of the combination of Mounjaro and Linzess. She has not been taking fiber supplements but notes that her bowel movements improved when she did take fiber for a few days. She is  interested in increasing her Mounjaro dose to aid in weight loss. She admits to not exercising regularly.    She is compliant with her Crestor medication and does not report missing any doses. She is open to increasing her Crestor dose if recommended.    She is currently on metoprolol and requests a refill.    She has significant hearing loss and wears hearing aids, which help her a lot. She is going to talk to somebody about getting new batteries for them. She has an upcoming appointment with an ophthalmologist for a diabetic eye exam and new glasses.      Patient Care Team:  Spring Silverio MD as PCP - General (Family Medicine)  Spring Silverio MD as PCP - Family Medicine   Current Outpatient Medications on File Prior to Visit   Medication Sig    albuterol sulfate  (90 Base) MCG/ACT inhaler Inhale 2 puffs Every 6 (Six) Hours As Needed for Wheezing.    Azelastine HCl 137 MCG/SPRAY solution USE 1 TO 2 SPRAY(S) IN EACH NOSTRIL TWICE DAILY AS NEEDED FOR DRAINAGE, SNEEZING OR COUGH    benazepril (LOTENSIN) 10 MG tablet TAKE 1 TABLET BY MOUTH DAILY    dicyclomine (BENTYL) 10 MG capsule Take 1 capsule by mouth 4 (Four) Times a Day Before Meals & at Bedtime.    DULoxetine (CYMBALTA) 60 MG capsule Take 1 capsule by mouth Daily.    EPINEPHrine (EPIPEN) 0.3 MG/0.3ML solution auto-injector injection     Erenumab-aooe (AIMOVIG) 140 MG/ML prefilled syringe Inject 0.0071 mL under the skin into the appropriate area as directed Every 30 (Thirty) Days.    esomeprazole (nexIUM) 40 MG capsule Take 1 capsule by mouth Daily.    glucose blood (OneTouch Verio) test strip Use as instructed    glucose blood (OneTouch Verio) test strip Use as instructed to check glucose once daily    guaiFENesin-codeine (GUAIFENESIN AC) 100-10 MG/5ML liquid Take 5 mL by mouth 3 (Three) Times a Day As Needed for Cough.    HYDROcodone-acetaminophen (NORCO) 5-325 MG per tablet Take 1 tablet by mouth Daily As Needed for Moderate Pain.     HYDROcodone-acetaminophen (Norco) 5-325 MG per tablet Take 1 tablet by mouth Daily As Needed for Moderate Pain.    HYDROcodone-acetaminophen (Norco) 5-325 MG per tablet Take 1 tablet by mouth Daily As Needed for Moderate Pain.    hydrOXYzine (ATARAX) 25 MG tablet Take 1 tablet by mouth 3 (Three) Times a Day As Needed for Itching.    Lancets Misc. kit Brand to match current supply of lancets DX E11.9    lidocaine (XYLOCAINE) 5 % ointment APPLY .5 G OF CREAM TO AFFECTED AREA NOT EXCEEDING MORE THEN 13 TUBE PER DAY.    Lidocaine-Tetracaine 7-7 % cream APPLY TO AFFECTED AREA TWICE DAILY AS NEEDED    linaclotide (LINZESS) 290 MCG capsule capsule Take 1 capsule by mouth Every Morning Before Breakfast.    Lyrica 150 MG capsule Take 1 capsule by mouth twice daily    mometasone (Elocon) 0.1 % cream Apply to rash twice daily as needed    mometasone (NASONEX) 50 MCG/ACT nasal spray 2 sprays into the nostril(s) as directed by provider Daily.    NON FORMULARY Allergy shots    ondansetron (Zofran) 4 MG tablet 1-2 tablets every 6-8 hours if needed.    OneTouch Verio test strip USE AND DISCARD 1 TEST     STRIP DAILY AS DIRECTED    rizatriptan (MAXALT) 10 MG tablet Take 1 tablet by mouth 1 (One) Time As Needed for Migraine (May repeat in 2 hours if needed for persistent headache).    Synthroid 88 MCG tablet TAKE ONE TABLET BY MOUTH EVERY DAY    tamsulosin (FLOMAX) 0.4 MG capsule 24 hr capsule Take 1 capsule by mouth Daily.    tiZANidine (ZANAFLEX) 4 MG tablet Take 1 tablet by mouth Every 8 (Eight) Hours As Needed for Muscle Spasms.    [DISCONTINUED] metoprolol succinate XL (TOPROL-XL) 25 MG 24 hr tablet Take 1 tablet by mouth Daily.    [DISCONTINUED] rosuvastatin (CRESTOR) 5 MG tablet TAKE ONE TABLET BY MOUTH EVERY DAY    [DISCONTINUED] Tirzepatide (Mounjaro) 10 MG/0.5ML solution auto-injector Inject 1 Pen under the skin into the appropriate area as directed 1 (One) Time Per Week. Mounjuro not Zepbound    [DISCONTINUED] Amitiza  "24 MCG capsule Take 1 capsule by mouth 2 (Two) Times a Day.     No current facility-administered medications on file prior to visit.       Objective   Vital Signs:   /72 (BP Location: Right arm, Patient Position: Sitting, Cuff Size: Large Adult)   Pulse 105   Temp 98 °F (36.7 °C) (Temporal)   Resp 18   Ht 160 cm (63\")   Wt 85.2 kg (187 lb 12.8 oz)   SpO2 96%   BMI 33.27 kg/m²    BP Readings from Last 3 Encounters:   05/06/25 128/72   04/18/25 96/70   02/20/25 124/83     Wt Readings from Last 3 Encounters:   05/06/25 85.2 kg (187 lb 12.8 oz)   04/22/25 83.5 kg (184 lb)   04/18/25 83.5 kg (184 lb)         Physical Exam  Vitals and nursing note reviewed.   Constitutional:       General: She is not in acute distress.     Appearance: Normal appearance. She is well-developed. She is obese. She is not ill-appearing.   HENT:      Head: Normocephalic and atraumatic.      Right Ear: Tympanic membrane, ear canal and external ear normal. There is no impacted cerumen.      Left Ear: Tympanic membrane, ear canal and external ear normal. There is no impacted cerumen.   Cardiovascular:      Rate and Rhythm: Normal rate and regular rhythm.      Heart sounds: No murmur heard.  Pulmonary:      Effort: Pulmonary effort is normal.      Breath sounds: Normal breath sounds. No wheezing.   Musculoskeletal:         General: Normal range of motion.   Feet:      Right foot:      Protective Sensation: 10 sites tested.  10 sites sensed.      Skin integrity: Skin integrity normal.      Toenail Condition: Right toenails are normal.      Left foot:      Protective Sensation: 10 sites tested.  10 sites sensed.      Skin integrity: Skin integrity normal.      Toenail Condition: Left toenails are normal.   Skin:     General: Skin is warm and dry.      Findings: No rash.   Neurological:      Mental Status: She is alert and oriented to person, place, and time.   Psychiatric:      Comments: Anxious          Physical Exam  Ears: Both ears " clear, no wax  Respiratory: Clear to auscultation, no wheezing, rales or rhonchi  Cardiovascular: Regular rate and rhythm, no murmurs, rubs, or gallops      No visits with results within 1 Day(s) from this visit.   Latest known visit with results is:   Results Encounter on 03/10/2025   Component Date Value Ref Range Status    Hemoglobin A1C 04/18/2025 6.0 (H)  4.8 - 5.6 % Final    Comment:          Prediabetes: 5.7 - 6.4           Diabetes: >6.4           Glycemic control for adults with diabetes: <7.0      Glucose 04/18/2025 103 (H)  70 - 99 mg/dL Final    BUN 04/18/2025 15  8 - 27 mg/dL Final    Creatinine 04/18/2025 0.87  0.57 - 1.00 mg/dL Final    EGFR Result 04/18/2025 73  >59 mL/min/1.73 Final    BUN/Creatinine Ratio 04/18/2025 17  12 - 28 Final    Sodium 04/18/2025 140  134 - 144 mmol/L Final    Potassium 04/18/2025 4.4  3.5 - 5.2 mmol/L Final    Chloride 04/18/2025 106  96 - 106 mmol/L Final    Total CO2 04/18/2025 22  20 - 29 mmol/L Final    Calcium 04/18/2025 9.1  8.7 - 10.3 mg/dL Final    Total Protein 04/18/2025 6.4  6.0 - 8.5 g/dL Final    Albumin 04/18/2025 4.0  3.9 - 4.9 g/dL Final    Globulin 04/18/2025 2.4  1.5 - 4.5 g/dL Final    Total Bilirubin 04/18/2025 0.6  0.0 - 1.2 mg/dL Final    Alkaline Phosphatase 04/18/2025 96  44 - 121 IU/L Final    AST (SGOT) 04/18/2025 16  0 - 40 IU/L Final    ALT (SGPT) 04/18/2025 12  0 - 32 IU/L Final    Total Cholesterol 04/18/2025 186  100 - 199 mg/dL Final    Triglycerides 04/18/2025 162 (H)  0 - 149 mg/dL Final    HDL Cholesterol 04/18/2025 50  >39 mg/dL Final    VLDL Cholesterol Silverio 04/18/2025 28  5 - 40 mg/dL Final    LDL Chol Calc (NIH) 04/18/2025 108 (H)  0 - 99 mg/dL Final    WBC 04/18/2025 4.8  3.4 - 10.8 x10E3/uL Final    RBC 04/18/2025 4.16  3.77 - 5.28 x10E6/uL Final    Hemoglobin 04/18/2025 12.0  11.1 - 15.9 g/dL Final    Hematocrit 04/18/2025 36.5  34.0 - 46.6 % Final    MCV 04/18/2025 88  79 - 97 fL Final    MCH 04/18/2025 28.8  26.6 - 33.0 pg Final     MCHC 04/18/2025 32.9  31.5 - 35.7 g/dL Final    RDW 04/18/2025 14.1  11.7 - 15.4 % Final    Platelets 04/18/2025 308  150 - 450 x10E3/uL Final    Neutrophil Rel % 04/18/2025 39  Not Estab. % Final    Lymphocyte Rel % 04/18/2025 48  Not Estab. % Final    Monocyte Rel % 04/18/2025 8  Not Estab. % Final    Eosinophil Rel % 04/18/2025 3  Not Estab. % Final    Basophil Rel % 04/18/2025 2  Not Estab. % Final    Neutrophils Absolute 04/18/2025 1.9  1.4 - 7.0 x10E3/uL Final    Lymphocytes Absolute 04/18/2025 2.3  0.7 - 3.1 x10E3/uL Final    Monocytes Absolute 04/18/2025 0.4  0.1 - 0.9 x10E3/uL Final    Eosinophils Absolute 04/18/2025 0.1  0.0 - 0.4 x10E3/uL Final    Basophils Absolute 04/18/2025 0.1  0.0 - 0.2 x10E3/uL Final    Immature Granulocyte Rel % 04/18/2025 0  Not Estab. % Final    Immature Grans Absolute 04/18/2025 0.0  0.0 - 0.1 x10E3/uL Final    TSH 04/18/2025 1.900  0.450 - 4.500 uIU/mL Final    Free T4 04/18/2025 1.06  0.82 - 1.77 ng/dL Final     A1C Last 3 Results          7/23/2024    15:24 10/25/2024    10:54 4/18/2025    12:02   HGBA1C Last 3 Results   Hemoglobin A1C 6.1  6.4  6.0      Lab Results   Component Value Date    CHLPL 186 04/18/2025    TRIG 162 (H) 04/18/2025    HDL 50 04/18/2025     (H) 04/18/2025     Lab Results   Component Value Date    TSH 1.900 04/18/2025     Lab Results   Component Value Date    GLUCOSE 103 (H) 04/18/2025    BUN 15 04/18/2025    CREATININE 0.87 04/18/2025    EGFRIFNONA 70 02/21/2022    EGFRIFAFRI 81 02/21/2022    BCR 17 04/18/2025    K 4.4 04/18/2025    CO2 22 04/18/2025    CALCIUM 9.1 04/18/2025    ALBUMIN 4.0 04/18/2025    AST 16 04/18/2025    ALT 12 04/18/2025     Lab Results   Component Value Date    WBC 4.8 04/18/2025    HGB 12.0 04/18/2025    HCT 36.5 04/18/2025    MCV 88 04/18/2025     04/18/2025             Results  Labs   - A1c: 6.0             Assessment and Plan    Diagnoses and all orders for this visit:    1. Type 2 diabetes mellitus without  complication, without long-term current use of insulin (Primary)  -     Microalbumin / Creatinine Urine Ratio - Urine, Random Void    2. Primary hypertension  -     metoprolol succinate XL (TOPROL-XL) 25 MG 24 hr tablet; Take 1 tablet by mouth Daily.  Dispense: 90 tablet; Refill: 0    3. Palpitations  -     metoprolol succinate XL (TOPROL-XL) 25 MG 24 hr tablet; Take 1 tablet by mouth Daily.  Dispense: 90 tablet; Refill: 0    4. Mixed hyperlipidemia  -     rosuvastatin (CRESTOR) 10 MG tablet; Take 1 tablet by mouth Daily.  Dispense: 90 tablet; Refill: 3    5. Acquired hypothyroidism    6. Class 1 obesity due to excess calories with serious comorbidity and body mass index (BMI) of 33.0 to 33.9 in adult    7. Current smoker    8. Major depressive disorder, recurrent, moderate  -     Ambulatory Referral to Psychiatry  -     Cariprazine HCl (Vraylar) 1.5 MG capsule capsule; Take 1 capsule by mouth Daily.  Dispense: 30 capsule; Refill: 2    Other orders  -     Tirzepatide (Mounjaro) 12.5 MG/0.5ML solution auto-injector; Inject 0.5 mL under the skin into the appropriate area as directed 1 (One) Time Per Week.  Dispense: 2 mL; Refill: 2        Assessment & Plan  1. Depression.  - Her symptoms suggest a need for psychiatric intervention.  - A referral to Dr. Justice, a psychiatrist at [facility], will be initiated. The potential benefits of adjunct treatment with Vraylar 1.5 mg were discussed, and she expressed interest in trying this medication.  - She was advised to report any thoughts of self-harm immediately. A list of counselors was provided for her reference.  - If Vraylar proves ineffective or causes side effects, or if she is unable to secure an appointment with Dr. Justice within 3 months, she is encouraged to schedule an earlier follow-up visit with myself.    2. Diabetes Mellitus.  - Her A1c level has improved from 6.4 to 6.0 over the past 6 months.  - The dosage of Mounjaro will be increased to 12.5 mg to  potentially aid in appetite control.  - She was advised to maintain a low-sugar diet and engage in regular physical activity.  -Stressed need to reduce chocolate and increase physical activity for better glycemic and weight control  - She reported explosive bowel movements potentially related to Linzess and Mounjaro; advised to continue fiber intake to manage symptoms.    3. Hyperlipidemia.  - Her cholesterol levels are slightly elevated.  - The dosage of Crestor will be increased to 10 mg.  - She was advised to adhere to a low-cholesterol diet and incorporate regular exercise into her routine.  - She is not currently committing to dietary changes due her depression and not preparing her own meals.    4. Medication Management.  - Refills for metoprolol will be sent to [facility].  - Prescription Drug Monitoring Program was reviewed.  - Mounjaro prescription will be sent to [facility].  - [facility]    Follow-up  The patient is scheduled for a follow-up visit in 3 months.      Medications Discontinued During This Encounter   Medication Reason    Amitiza 24 MCG capsule *Therapy completed    Tirzepatide (Mounjaro) 10 MG/0.5ML solution auto-injector Dose adjustment    rosuvastatin (CRESTOR) 5 MG tablet     metoprolol succinate XL (TOPROL-XL) 25 MG 24 hr tablet Reorder         Follow Up     Return in about 3 months (around 8/6/2025) for diabetes, depression, cholesterol etc.    Patient was given instructions and counseling regarding her condition or for health maintenance advice. Please see specific information pulled into the AVS if appropriate.     Patient or patient representative verbalized consent for the use of Ambient Listening during the visit with  Spring Silverio MD for chart documentation. 5/6/2025  12:06 EDT

## 2025-05-06 ENCOUNTER — OFFICE VISIT (OUTPATIENT)
Dept: FAMILY MEDICINE CLINIC | Facility: CLINIC | Age: 70
End: 2025-05-06
Payer: MEDICARE

## 2025-05-06 VITALS
TEMPERATURE: 98 F | OXYGEN SATURATION: 96 % | BODY MASS INDEX: 33.27 KG/M2 | WEIGHT: 187.8 LBS | HEIGHT: 63 IN | SYSTOLIC BLOOD PRESSURE: 128 MMHG | RESPIRATION RATE: 18 BRPM | HEART RATE: 105 BPM | DIASTOLIC BLOOD PRESSURE: 72 MMHG

## 2025-05-06 DIAGNOSIS — F17.200 CURRENT SMOKER: ICD-10-CM

## 2025-05-06 DIAGNOSIS — I10 PRIMARY HYPERTENSION: ICD-10-CM

## 2025-05-06 DIAGNOSIS — E66.09 CLASS 1 OBESITY DUE TO EXCESS CALORIES WITH SERIOUS COMORBIDITY AND BODY MASS INDEX (BMI) OF 33.0 TO 33.9 IN ADULT: ICD-10-CM

## 2025-05-06 DIAGNOSIS — R00.2 PALPITATIONS: ICD-10-CM

## 2025-05-06 DIAGNOSIS — F33.1 MAJOR DEPRESSIVE DISORDER, RECURRENT, MODERATE: ICD-10-CM

## 2025-05-06 DIAGNOSIS — E03.9 ACQUIRED HYPOTHYROIDISM: ICD-10-CM

## 2025-05-06 DIAGNOSIS — E66.811 CLASS 1 OBESITY DUE TO EXCESS CALORIES WITH SERIOUS COMORBIDITY AND BODY MASS INDEX (BMI) OF 33.0 TO 33.9 IN ADULT: ICD-10-CM

## 2025-05-06 DIAGNOSIS — E11.9 TYPE 2 DIABETES MELLITUS WITHOUT COMPLICATION, WITHOUT LONG-TERM CURRENT USE OF INSULIN: Primary | ICD-10-CM

## 2025-05-06 DIAGNOSIS — E78.2 MIXED HYPERLIPIDEMIA: ICD-10-CM

## 2025-05-06 RX ORDER — METOPROLOL SUCCINATE 25 MG/1
25 TABLET, EXTENDED RELEASE ORAL DAILY
Qty: 90 TABLET | Refills: 0 | Status: SHIPPED | OUTPATIENT
Start: 2025-05-06

## 2025-05-06 RX ORDER — ROSUVASTATIN CALCIUM 10 MG/1
10 TABLET, COATED ORAL DAILY
Qty: 90 TABLET | Refills: 3 | Status: SHIPPED | OUTPATIENT
Start: 2025-05-06

## 2025-05-06 RX ORDER — TIRZEPATIDE 12.5 MG/.5ML
12.5 INJECTION, SOLUTION SUBCUTANEOUS WEEKLY
Qty: 2 ML | Refills: 2 | Status: SHIPPED | OUTPATIENT
Start: 2025-05-06

## 2025-05-07 LAB
ALBUMIN/CREAT UR: 8 MG/G CREAT (ref 0–29)
CREAT UR-MCNC: 166.2 MG/DL
MICROALBUMIN UR-MCNC: 12.8 UG/ML

## 2025-05-12 ENCOUNTER — OFFICE VISIT (OUTPATIENT)
Dept: FAMILY MEDICINE CLINIC | Facility: CLINIC | Age: 70
End: 2025-05-12
Payer: MEDICARE

## 2025-05-12 VITALS
SYSTOLIC BLOOD PRESSURE: 136 MMHG | RESPIRATION RATE: 18 BRPM | HEART RATE: 86 BPM | WEIGHT: 187.4 LBS | TEMPERATURE: 98.4 F | BODY MASS INDEX: 31.99 KG/M2 | DIASTOLIC BLOOD PRESSURE: 88 MMHG | HEIGHT: 64 IN | OXYGEN SATURATION: 100 %

## 2025-05-12 DIAGNOSIS — E66.811 CLASS 1 OBESITY WITH BODY MASS INDEX (BMI) OF 32.0 TO 32.9 IN ADULT, UNSPECIFIED OBESITY TYPE, UNSPECIFIED WHETHER SERIOUS COMORBIDITY PRESENT: ICD-10-CM

## 2025-05-12 DIAGNOSIS — F95.9 FACIAL TIC: ICD-10-CM

## 2025-05-12 DIAGNOSIS — R21 RASH: Primary | ICD-10-CM

## 2025-05-12 DIAGNOSIS — F33.1 MAJOR DEPRESSIVE DISORDER, RECURRENT, MODERATE: ICD-10-CM

## 2025-05-12 DIAGNOSIS — J30.2 SEASONAL ALLERGIC RHINITIS, UNSPECIFIED TRIGGER: ICD-10-CM

## 2025-05-12 DIAGNOSIS — R53.83 OTHER FATIGUE: ICD-10-CM

## 2025-05-12 PROCEDURE — G2211 COMPLEX E/M VISIT ADD ON: HCPCS | Performed by: FAMILY MEDICINE

## 2025-05-12 PROCEDURE — 1126F AMNT PAIN NOTED NONE PRSNT: CPT | Performed by: FAMILY MEDICINE

## 2025-05-12 PROCEDURE — 3075F SYST BP GE 130 - 139MM HG: CPT | Performed by: FAMILY MEDICINE

## 2025-05-12 PROCEDURE — 3044F HG A1C LEVEL LT 7.0%: CPT | Performed by: FAMILY MEDICINE

## 2025-05-12 PROCEDURE — 3079F DIAST BP 80-89 MM HG: CPT | Performed by: FAMILY MEDICINE

## 2025-05-12 PROCEDURE — 99214 OFFICE O/P EST MOD 30 MIN: CPT | Performed by: FAMILY MEDICINE

## 2025-05-12 NOTE — PROGRESS NOTES
Chief Complaint  Rash and Fatigue    History of Present Illness  Johanna Swan presents today for new onset concern of rash and fatigue    Rash: Patient complains of rash involving the abdomen, bilateral ankle, and bilateral arm. Rash started 3 days ago. Appearance of rash at onset: Color of lesion(s): red. Rash has changed over time Initial distribution: bilateral arm.  Discomfort associated with rash: causes no discomfort.  Associated symptoms:  fatigue . Denies: cough, fever, nausea, and vomiting. Patient has not had previous evaluation of rash. Patient has not had previous treatment. Patient has not had contacts with similar rash. Patient has identified precipitant. Patient has had new exposures, started Vraylar about 3 days before rash appeared.  History of Present Illness  The patient is a 69-year-old female who presents for evaluation of a new rash.    She reports the onset of a rash.The rash has not shown signs of improvement. She discontinued Vraylar today due to its sedative effects and plans to resume it tonight. She is uncertain if the Vraylar is the cause of the rash. She recalls a previous episode of blistering with Lyrica, which she believes could have been exacerbated by the Vraylar. Despite the blistering, she continued Lyrica without dose reduction due to its efficacy in managing her back pain and that rash has not returned. She describes the previous rash as severe, with scabbing and scarring, localized to both ankles. She reports no recurrence of similar rashes. She does not believe the rash is due to insect bites, bedbugs, or fleas, noting that the rash appeared after waking up. She has not observed any rashes on her back. The rash is not itchy, but she expresses concern about it.     She received a shingles vaccine approximately a year ago. She has been on Lyrica for an extended period and has tolerated it well. She reports feeling better since starting Vraylar, with noticeable improvement  from the first day stating it gave her feeling that things were going to be okay. She expresses concern about potential internal effects of the rash and its impact on her face. She has experienced sleep disturbances over the past two nights and reports a sensation in her eyes similar to that experienced with restless leg syndrome.  A tiredness in her eyes the need to blink.  She has not developed increase in restless leg syndrome symptoms. She was previously taken off another medication due to facial side effects (by description possibly tardive dyskinesia) and is reluctant to discontinue Vraylar. She has not previously tried Rexulti or Abilify.     She received an allergy injection last week and is scheduled for weekly injections. She mowed the lawn yesterday and woke up with a sore throat last night, which she managed by spitting out the mucus. She has a history of tics, which she manages by incorporating them into her regular movements. She has a family history of tics in her father and Tourette's syndrome in her sister.    FAMILY HISTORY  Her father had tics.  Her sister has Tourette's syndrome.      Patient Care Team:  Spring Silverio MD as PCP - General (Family Medicine)  Spring Silverio MD as PCP - Family Medicine   Current Outpatient Medications on File Prior to Visit   Medication Sig    albuterol sulfate  (90 Base) MCG/ACT inhaler Inhale 2 puffs Every 6 (Six) Hours As Needed for Wheezing.    Azelastine HCl 137 MCG/SPRAY solution USE 1 TO 2 SPRAY(S) IN EACH NOSTRIL TWICE DAILY AS NEEDED FOR DRAINAGE, SNEEZING OR COUGH    benazepril (LOTENSIN) 10 MG tablet TAKE 1 TABLET BY MOUTH DAILY    Cariprazine HCl (Vraylar) 1.5 MG capsule capsule Take 1 capsule by mouth Daily.    dicyclomine (BENTYL) 10 MG capsule Take 1 capsule by mouth 4 (Four) Times a Day Before Meals & at Bedtime.    DULoxetine (CYMBALTA) 60 MG capsule Take 1 capsule by mouth Daily.    EPINEPHrine (EPIPEN) 0.3 MG/0.3ML  solution auto-injector injection     Erenumab-aooe (AIMOVIG) 140 MG/ML prefilled syringe Inject 0.0071 mL under the skin into the appropriate area as directed Every 30 (Thirty) Days.    esomeprazole (nexIUM) 40 MG capsule Take 1 capsule by mouth Daily.    glucose blood (OneTouch Verio) test strip Use as instructed    glucose blood (OneTouch Verio) test strip Use as instructed to check glucose once daily    guaiFENesin-codeine (GUAIFENESIN AC) 100-10 MG/5ML liquid Take 5 mL by mouth 3 (Three) Times a Day As Needed for Cough.    HYDROcodone-acetaminophen (NORCO) 5-325 MG per tablet Take 1 tablet by mouth Daily As Needed for Moderate Pain.    HYDROcodone-acetaminophen (Norco) 5-325 MG per tablet Take 1 tablet by mouth Daily As Needed for Moderate Pain.    HYDROcodone-acetaminophen (Norco) 5-325 MG per tablet Take 1 tablet by mouth Daily As Needed for Moderate Pain.    hydrOXYzine (ATARAX) 25 MG tablet Take 1 tablet by mouth 3 (Three) Times a Day As Needed for Itching.    Lancets Misc. kit Brand to match current supply of lancets DX E11.9    lidocaine (XYLOCAINE) 5 % ointment APPLY .5 G OF CREAM TO AFFECTED AREA NOT EXCEEDING MORE THEN 13 TUBE PER DAY.    Lidocaine-Tetracaine 7-7 % cream APPLY TO AFFECTED AREA TWICE DAILY AS NEEDED    linaclotide (LINZESS) 290 MCG capsule capsule Take 1 capsule by mouth Every Morning Before Breakfast.    Lyrica 150 MG capsule Take 1 capsule by mouth twice daily    metoprolol succinate XL (TOPROL-XL) 25 MG 24 hr tablet Take 1 tablet by mouth Daily.    mometasone (Elocon) 0.1 % cream Apply to rash twice daily as needed    mometasone (NASONEX) 50 MCG/ACT nasal spray 2 sprays into the nostril(s) as directed by provider Daily.    NON FORMULARY Allergy shots    ondansetron (Zofran) 4 MG tablet 1-2 tablets every 6-8 hours if needed.    OneTouch Verio test strip USE AND DISCARD 1 TEST     STRIP DAILY AS DIRECTED    rizatriptan (MAXALT) 10 MG tablet Take 1 tablet by mouth 1 (One) Time As Needed  "for Migraine (May repeat in 2 hours if needed for persistent headache).    rosuvastatin (CRESTOR) 10 MG tablet Take 1 tablet by mouth Daily.    Synthroid 88 MCG tablet TAKE ONE TABLET BY MOUTH EVERY DAY    tamsulosin (FLOMAX) 0.4 MG capsule 24 hr capsule Take 1 capsule by mouth Daily.    Tirzepatide (Mounjaro) 12.5 MG/0.5ML solution auto-injector Inject 0.5 mL under the skin into the appropriate area as directed 1 (One) Time Per Week.    tiZANidine (ZANAFLEX) 4 MG tablet Take 1 tablet by mouth Every 8 (Eight) Hours As Needed for Muscle Spasms.     No current facility-administered medications on file prior to visit.       Objective   Vital Signs:   /88 (BP Location: Right arm, Patient Position: Sitting, Cuff Size: Large Adult)   Pulse 86   Temp 98.4 °F (36.9 °C) (Temporal)   Resp 18   Ht 162.6 cm (64\")   Wt 85 kg (187 lb 6.4 oz)   SpO2 100%   BMI 32.17 kg/m²    BP Readings from Last 3 Encounters:   05/12/25 136/88   05/06/25 128/72   04/18/25 96/70     Wt Readings from Last 3 Encounters:   05/12/25 85 kg (187 lb 6.4 oz)   05/06/25 85.2 kg (187 lb 12.8 oz)   04/22/25 83.5 kg (184 lb)         Physical Exam  Vitals and nursing note reviewed.   Constitutional:       General: She is not in acute distress.     Appearance: She is well-developed. She is obese. She is not ill-appearing.   HENT:      Head: Normocephalic and atraumatic.   Cardiovascular:      Rate and Rhythm: Normal rate and regular rhythm.      Heart sounds: No murmur heard.  Pulmonary:      Effort: Pulmonary effort is normal.      Breath sounds: Normal breath sounds. No wheezing.   Musculoskeletal:         General: Normal range of motion.   Skin:     General: Skin is warm and dry.      Findings: Rash present.      Comments: Rash is characterized by approximately 20 lesions in groupings of 1-5 red papules measuring approximately 4 mm each near her antecubital spaces bilaterally group on her left and right abdomen single lesions near her ankles and " 1 on upper left breast.  There are no lesions on the back.  Despite her descriptions of blister there is no evidence of vesicular fluid-filled lesions   Neurological:      Mental Status: She is alert and oriented to person, place, and time.   Psychiatric:         Mood and Affect: Mood normal.          Physical Exam        No visits with results within 1 Day(s) from this visit.   Latest known visit with results is:   Office Visit on 05/06/2025   Component Date Value Ref Range Status    Creatinine, Urine 05/06/2025 166.2  Not Estab. mg/dL Final    Microalbumin, Urine 05/06/2025 12.8  Not Estab. ug/mL Final    Microalbumin/Creatinine Ratio 05/06/2025 8  0 - 29 mg/g creat Final    Comment:                        Normal:                0 -  29                         Moderately increased: 30 - 300                         Severely increased:       >300       A1C Last 3 Results          7/23/2024    15:24 10/25/2024    10:54 4/18/2025    12:02   HGBA1C Last 3 Results   Hemoglobin A1C 6.1  6.4  6.0      Lab Results   Component Value Date    CHLPL 186 04/18/2025    TRIG 162 (H) 04/18/2025    HDL 50 04/18/2025     (H) 04/18/2025     Lab Results   Component Value Date    TSH 1.900 04/18/2025     Lab Results   Component Value Date    GLUCOSE 103 (H) 04/18/2025    BUN 15 04/18/2025    CREATININE 0.87 04/18/2025    EGFRIFNONA 70 02/21/2022    EGFRIFAFRI 81 02/21/2022    BCR 17 04/18/2025    K 4.4 04/18/2025    CO2 22 04/18/2025    CALCIUM 9.1 04/18/2025    ALBUMIN 4.0 04/18/2025    AST 16 04/18/2025    ALT 12 04/18/2025     Lab Results   Component Value Date    WBC 4.8 04/18/2025    HGB 12.0 04/18/2025    HCT 36.5 04/18/2025    MCV 88 04/18/2025     04/18/2025             Results               Assessment and Plan    Diagnoses and all orders for this visit:    1. Rash (Primary)    2. Class 1 obesity with body mass index (BMI) of 32.0 to 32.9 in adult, unspecified obesity type, unspecified whether serious comorbidity  present    3. Major depressive disorder, recurrent, moderate    4. Seasonal allergic rhinitis, unspecified trigger    5. Other fatigue    6. Facial tic        Assessment & Plan  1. Rash.  - The etiology of the rash remains uncertain, but it could potentially be a side effect of Vraylar, given the temporal association with the initiation of the medication.  - The rash does not exhibit characteristics typical of a hives reaction. The possibility of bedbug or other insect bites cannot be completely ruled out.  - The rash does not appear to be indicative of a severe allergic reaction. She has been advised to monitor the progression of the rash and provide photographic documentation via MyChart for any changes.  - She has also been instructed to inspect her mattress for any signs of bedbugs. She should continue with her scheduled allergy injections unless otherwise directed by her allergist.    2. Facial tics.  - The facial tics could be a manifestation of tardive dyskinesia, although this is unlikely given the short duration of Vraylar use.  - The restlessness she experiences is consistent with akathisia, a common side effect of Vraylar.  - She has been informed about the potential side effects of Vraylar, including tardive dyskinesia and akathisia. Alternative medications such as Rexulti or Abilify have been discussed.  - She prefers to continue with Vraylar at this time.    Follow-up  - The patient is scheduled for a follow-up visit on 08/08/2025.      There are no discontinued medications.      Follow Up     Return in 3 months (on 8/8/2025) for as previously scheduled.    Patient was given instructions and counseling regarding her condition or for health maintenance advice. Please see specific information pulled into the AVS if appropriate.     Patient or patient representative verbalized consent for the use of Ambient Listening during the visit with  Spring Silverio MD for chart documentation. 5/12/2025   16:09 EDT

## 2025-05-13 DIAGNOSIS — R00.2 PALPITATIONS: ICD-10-CM

## 2025-05-13 DIAGNOSIS — I10 PRIMARY HYPERTENSION: ICD-10-CM

## 2025-05-13 RX ORDER — LIDOCAINE AND TETRACAINE 70; 70 MG/G; MG/G
CREAM TOPICAL
OUTPATIENT
Start: 2025-05-13

## 2025-05-13 RX ORDER — METOPROLOL SUCCINATE 25 MG/1
25 TABLET, EXTENDED RELEASE ORAL DAILY
Qty: 90 TABLET | Refills: 0 | Status: SHIPPED | OUTPATIENT
Start: 2025-05-13

## 2025-05-13 NOTE — TELEPHONE ENCOUNTER
Yes, you need to request refills from the originally prescribing providers.  Please request from Dr. Khan

## 2025-05-27 ENCOUNTER — TELEPHONE (OUTPATIENT)
Dept: FAMILY MEDICINE CLINIC | Facility: CLINIC | Age: 70
End: 2025-05-27

## 2025-05-27 NOTE — TELEPHONE ENCOUNTER
"  Caller: Johanna Swan \"Dennis\"    Relationship: Self    Best call back number: [phone]     Which medication are you concerned about:     ARIPiprazole (Abilify) 2 MG tablet     Who prescribed you this medication: DR. CHARLES     When did you start taking this medication: ABOUT A WEEK AGO     What are your concerns: PATIENT STATED SHE HAD DISCUSSED WITH DR. CHARLES ABOUT STOPPING THE ABILIFY DUE TO HOW IT MADE HER FEEL. PATIENT STATED THAT DR. CHARLES GAVE HER THE OK. PATIENT DID NOT STOP TAKING IT AND THE LAST ONE SHE TOOK WAS YESTERDAY. SHE IS FEELING BETTER TODAY. SHE DOES NOT FEEL SLEEPY TODAY. PATIENT HAS NOT TAKEN ONE TODAY. SHE IS WANTING TO KNOW IF SHE SHOULD TAKE ONE TODAY OR CONTINUE TO STOP TAKING IT. PLEASE ADVISE.           "

## 2025-06-02 DIAGNOSIS — F33.1 MAJOR DEPRESSIVE DISORDER, RECURRENT, MODERATE: ICD-10-CM

## 2025-06-02 RX ORDER — ARIPIPRAZOLE 2 MG/1
2 TABLET ORAL DAILY
Qty: 30 TABLET | Refills: 0 | Status: SHIPPED | OUTPATIENT
Start: 2025-06-02

## 2025-06-09 ENCOUNTER — OFFICE VISIT (OUTPATIENT)
Dept: ORTHOPEDIC SURGERY | Facility: CLINIC | Age: 70
End: 2025-06-09
Payer: MEDICARE

## 2025-06-09 VITALS — BODY MASS INDEX: 31.92 KG/M2 | OXYGEN SATURATION: 94 % | HEIGHT: 64 IN | WEIGHT: 187 LBS

## 2025-06-09 DIAGNOSIS — M17.11 PRIMARY OSTEOARTHRITIS OF RIGHT KNEE: ICD-10-CM

## 2025-06-09 DIAGNOSIS — M17.12 PRIMARY OSTEOARTHRITIS OF LEFT KNEE: ICD-10-CM

## 2025-06-09 NOTE — PROGRESS NOTES
"   Patient ID: Johanna Swan is a 69 y.o. female.  Returns with her  for Euflexxa HA injection to bilateral knees for treatment of known osteoarthritis.        Objective:  Ht 162.6 cm (64\")   Wt 84.8 kg (187 lb)   LMP  (LMP Unknown)   SpO2 94%   BMI 32.10 kg/m²     Physical Examination:    Right knee demonstrates intact skin.  No signs of infection or effusion.  Extension 0, flexion 90+ positive retropatellar crepitus    Left knee demonstrates intact skin no effusion no signs of infection.  Extension 0, flexion 90+ with positive retropatellar crepitus    Imaging:   No new imaging    Assessment:    Diagnoses and all orders for this visit:    1. Primary osteoarthritis of left knee  -     Visco Treatment    2. Primary osteoarthritis of right knee  -     Visco Treatment    Other orders  -     Large Joint Arthrocentesis  -     Large Joint Arthrocentesis    Plan: Restrictions discussed.  She may resume activity as tolerated on Wednesday morning.  Follow-up in 1 week for #2 injection to bilateral knees.       Large Joint Arthrocentesis: R knee  Date/Time: 6/9/2025 11:21 AM  Consent given by: patient  Site marked: site marked  Timeout: Immediately prior to procedure a time out was called to verify the correct patient, procedure, equipment, support staff and site/side marked as required   Supporting Documentation  Indications: pain   Procedure Details  Location: knee - R knee  Preparation: Patient was prepped and draped in the usual sterile fashion  Needle size: 22 G  Approach: anterolateral  Medications administered: 20 mg Sodium Hyaluronate (Viscosup) 20 MG/2ML  Patient tolerance: patient tolerated the procedure well with no immediate complications      Large Joint Arthrocentesis: L knee  Date/Time: 6/9/2025 11:27 AM  Consent given by: patient  Site marked: site marked  Timeout: Immediately prior to procedure a time out was called to verify the correct patient, procedure, equipment, support staff and " site/side marked as required   Supporting Documentation  Indications: pain   Procedure Details  Location: knee - L knee  Preparation: Patient was prepped and draped in the usual sterile fashion  Needle size: 22 G  Approach: anterolateral  Medications administered: 20 mg Sodium Hyaluronate (Viscosup) 20 MG/2ML  Patient tolerance: patient tolerated the procedure well with no immediate complications       Disclaimer: Part of this note may be an electronic transcription/translation of spoken language to printed text using the Dragon Dictation System

## 2025-06-16 ENCOUNTER — OFFICE VISIT (OUTPATIENT)
Dept: ORTHOPEDIC SURGERY | Facility: CLINIC | Age: 70
End: 2025-06-16
Payer: MEDICARE

## 2025-06-16 VITALS — HEIGHT: 64 IN | WEIGHT: 187 LBS | BODY MASS INDEX: 31.92 KG/M2 | OXYGEN SATURATION: 97 %

## 2025-06-16 DIAGNOSIS — M17.11 PRIMARY OSTEOARTHRITIS OF RIGHT KNEE: ICD-10-CM

## 2025-06-16 DIAGNOSIS — M17.12 PRIMARY OSTEOARTHRITIS OF LEFT KNEE: Primary | ICD-10-CM

## 2025-06-16 NOTE — PROGRESS NOTES
"   Patient ID: Johanna Swan is a 69 y.o. female returns for #2 injection of Euflexxa HA to bilateral knees for known osteoarthritis.  She reports she is already feeling benefit from the injection she received approximately 1 week ago.  And she looks forward to the benefit the second injection and third will provide    Objective:  Ht 162.6 cm (64\")   Wt 84.8 kg (187 lb)   LMP  (LMP Unknown)   SpO2 97%   BMI 32.10 kg/m²     Physical Examination:    Right knee demonstrates intact skin infection signs.  No effusion no warmth.  Extension 0, flexion 90+    Left knee demonstrates intact skin, no signs of infection, no effusion nor warmth.  Extension 0 and flexion 90+    Imaging:   None new    Assessment:    Diagnoses and all orders for this visit:    1. Primary osteoarthritis of left knee (Primary)    2. Primary osteoarthritis of right knee    Other orders  -     Cancel: Large Joint Arthrocentesis  -     Cancel: Large Joint Arthrocentesis  -     Large Joint Arthrocentesis  -     Large Joint Arthrocentesis     Plan: HA, Euflexxa, provided to the intra-articular space of bilateral knees today.  Restrictions discussed.  May resume activity as tolerated Wednesday morning.  Follow-up in 1 week for #3 injection of HA Euflexxa.  All questions answered       Large Joint Arthrocentesis: R knee  Date/Time: 6/16/2025 1:11 PM  Consent given by: patient  Site marked: site marked  Timeout: Immediately prior to procedure a time out was called to verify the correct patient, procedure, equipment, support staff and site/side marked as required   Supporting Documentation  Indications: pain   Procedure Details  Location: knee - R knee  Preparation: Patient was prepped and draped in the usual sterile fashion  Needle size: 22 G  Approach: anterolateral  Medications administered: 20 mg Sodium Hyaluronate (Viscosup) 20 MG/2ML  Patient tolerance: patient tolerated the procedure well with no immediate complications      Large Joint " Arthrocentesis: L knee  Date/Time: 6/20/2025 1:12 PM  Consent given by: patient  Site marked: site marked  Timeout: Immediately prior to procedure a time out was called to verify the correct patient, procedure, equipment, support staff and site/side marked as required   Supporting Documentation  Indications: pain   Procedure Details  Location: knee - L knee  Preparation: Patient was prepped and draped in the usual sterile fashion  Needle size: 22 G  Approach: anterolateral  Medications administered: 20 mg Sodium Hyaluronate (Viscosup) 20 MG/2ML  Patient tolerance: patient tolerated the procedure well with no immediate complications      Disclaimer: Part of this note may be an electronic transcription/translation of spoken language to printed text using the Dragon Dictation System

## 2025-06-23 ENCOUNTER — OFFICE VISIT (OUTPATIENT)
Dept: ORTHOPEDIC SURGERY | Facility: CLINIC | Age: 70
End: 2025-06-23
Payer: MEDICARE

## 2025-06-23 VITALS — BODY MASS INDEX: 31.92 KG/M2 | HEIGHT: 64 IN | OXYGEN SATURATION: 97 % | HEART RATE: 95 BPM | WEIGHT: 187 LBS

## 2025-06-23 DIAGNOSIS — M17.12 PRIMARY OSTEOARTHRITIS OF LEFT KNEE: Primary | ICD-10-CM

## 2025-06-23 DIAGNOSIS — M17.11 PRIMARY OSTEOARTHRITIS OF RIGHT KNEE: ICD-10-CM

## 2025-06-23 NOTE — PROGRESS NOTES
"   Patient ID: Johanna Swan is a 69 y.o. female returns with her  for Euflexxa #3 injection to bilateral knees for treatment of known osteoarthritis.  She reports benefit already with the injection she has been receiving        Objective:  Pulse 95   Ht 162.6 cm (64\")   Wt 84.8 kg (187 lb)   LMP  (LMP Unknown)   SpO2 97%   BMI 32.10 kg/m²     Physical Examination:    Right knee demonstrates intact skin no effusion no signs of infection.  Extension 0, flexion 120+ with retropatellar crepitus  Negative calf tenderness  Range of motion at the ankle and hip grossly intact    Left knee demonstrates intact skin, no effusion nor signs of infection.  Extension 0, flexion 120+ with retropatellar crepitus.  Negative calf tenderness  Range of motion of the ankle and hip grossly intact.    Imaging:   No new imaging.    Assessment:    Diagnoses and all orders for this visit:    1. Primary osteoarthritis of left knee (Primary)    2. Primary osteoarthritis of right knee    Other orders  -     Large Joint Arthrocentesis  -     Large Joint Arthrocentesis    Plan: Restrictions discussed.  She may resume activity as tolerated on Wednesday morning.  Follow-up in 3 months or 12 weeks for further evaluation of HA efficacy.  All questions answered.      Large Joint Arthrocentesis: R knee  Date/Time: 6/23/2025 3:07 PM  Consent given by: patient  Site marked: site marked  Timeout: Immediately prior to procedure a time out was called to verify the correct patient, procedure, equipment, support staff and site/side marked as required   Supporting Documentation  Indications: pain   Procedure Details  Location: knee - R knee  Preparation: Patient was prepped and draped in the usual sterile fashion  Needle size: 22 G  Approach: anterolateral  Medications administered: 20 mg Sodium Hyaluronate (Viscosup) 20 MG/2ML  Patient tolerance: patient tolerated the procedure well with no immediate complications      Large Joint " Arthrocentesis: L knee  Date/Time: 6/23/2025 3:08 PM  Consent given by: patient  Site marked: site marked  Timeout: Immediately prior to procedure a time out was called to verify the correct patient, procedure, equipment, support staff and site/side marked as required   Supporting Documentation  Indications: pain   Procedure Details  Location: knee - L knee  Preparation: Patient was prepped and draped in the usual sterile fashion  Needle size: 22 G  Approach: anterolateral  Medications administered: 20 mg Sodium Hyaluronate (Viscosup) 20 MG/2ML  Patient tolerance: patient tolerated the procedure well with no immediate complications      Disclaimer: Part of this note may be an electronic transcription/translation of spoken language to printed text using the Dragon Dictation System

## 2025-07-25 ENCOUNTER — OFFICE VISIT (OUTPATIENT)
Dept: PAIN MEDICINE | Facility: CLINIC | Age: 70
End: 2025-07-25
Payer: MEDICARE

## 2025-07-25 VITALS
SYSTOLIC BLOOD PRESSURE: 97 MMHG | BODY MASS INDEX: 30.9 KG/M2 | OXYGEN SATURATION: 96 % | HEART RATE: 82 BPM | WEIGHT: 180 LBS | RESPIRATION RATE: 16 BRPM | DIASTOLIC BLOOD PRESSURE: 61 MMHG

## 2025-07-25 DIAGNOSIS — M48.062 SPINAL STENOSIS OF LUMBAR REGION WITH NEUROGENIC CLAUDICATION: ICD-10-CM

## 2025-07-25 DIAGNOSIS — M54.2 CERVICALGIA: ICD-10-CM

## 2025-07-25 DIAGNOSIS — G43.809 OTHER MIGRAINE WITHOUT STATUS MIGRAINOSUS, NOT INTRACTABLE: ICD-10-CM

## 2025-07-25 DIAGNOSIS — M54.41 CHRONIC BILATERAL LOW BACK PAIN WITH BILATERAL SCIATICA: Chronic | ICD-10-CM

## 2025-07-25 DIAGNOSIS — G25.81 RESTLESS LEG SYNDROME: ICD-10-CM

## 2025-07-25 DIAGNOSIS — M48.04 SPINAL STENOSIS OF THORACIC REGION: ICD-10-CM

## 2025-07-25 DIAGNOSIS — G89.29 CHRONIC PAIN OF BOTH KNEES: ICD-10-CM

## 2025-07-25 DIAGNOSIS — M79.672 PAIN IN BOTH FEET: ICD-10-CM

## 2025-07-25 DIAGNOSIS — M51.372 DEGENERATION OF INTERVERTEBRAL DISC OF LUMBOSACRAL REGION WITH DISCOGENIC BACK PAIN AND LOWER EXTREMITY PAIN: ICD-10-CM

## 2025-07-25 DIAGNOSIS — G89.29 CHRONIC BILATERAL LOW BACK PAIN WITH BILATERAL SCIATICA: Chronic | ICD-10-CM

## 2025-07-25 DIAGNOSIS — M48.02 SPINAL STENOSIS OF CERVICAL REGION: ICD-10-CM

## 2025-07-25 DIAGNOSIS — M25.561 CHRONIC PAIN OF BOTH KNEES: ICD-10-CM

## 2025-07-25 DIAGNOSIS — M25.562 CHRONIC PAIN OF BOTH KNEES: ICD-10-CM

## 2025-07-25 DIAGNOSIS — M47.16 LUMBAR SPONDYLOSIS WITH MYELOPATHY: ICD-10-CM

## 2025-07-25 DIAGNOSIS — G89.29 CHRONIC BILATERAL THORACIC BACK PAIN: ICD-10-CM

## 2025-07-25 DIAGNOSIS — M46.1 SACROILIITIS: ICD-10-CM

## 2025-07-25 DIAGNOSIS — M47.12 CERVICAL SPONDYLOSIS WITH MYELOPATHY: ICD-10-CM

## 2025-07-25 DIAGNOSIS — M54.6 CHRONIC BILATERAL THORACIC BACK PAIN: ICD-10-CM

## 2025-07-25 DIAGNOSIS — Z79.899 HIGH RISK MEDICATION USE: Primary | ICD-10-CM

## 2025-07-25 DIAGNOSIS — M54.42 CHRONIC BILATERAL LOW BACK PAIN WITH BILATERAL SCIATICA: Chronic | ICD-10-CM

## 2025-07-25 DIAGNOSIS — M79.671 PAIN IN BOTH FEET: ICD-10-CM

## 2025-07-25 DIAGNOSIS — M50.30 DEGENERATIVE DISC DISEASE, CERVICAL: ICD-10-CM

## 2025-07-25 RX ORDER — PREGABALIN 150 MG/1
150 CAPSULE ORAL 2 TIMES DAILY
Qty: 60 CAPSULE | Refills: 5 | Status: SHIPPED | OUTPATIENT
Start: 2025-07-25

## 2025-07-25 RX ORDER — HYDROCODONE BITARTRATE AND ACETAMINOPHEN 5; 325 MG/1; MG/1
1 TABLET ORAL DAILY PRN
Qty: 30 TABLET | Refills: 0 | Status: SHIPPED | OUTPATIENT
Start: 2025-07-25

## 2025-07-25 NOTE — PROGRESS NOTES
Subjective   Johanna Swan is a 69 y.o. female.     History of Present Illness  CC: low back pain    Chronic pain in neck, thoracic and lumbar spine, b/l knees, began > 10 years ago, treated by me since 8/8/24, 8/10 at worst, 3/10 at best, always present, varies, aching, sharp, worse with bending and lifting, interferes with ADLs, activity, failed PT, chiropractor, massage, acupuncture, meds. MRI shows DJD in C-, T-, L-spine with severe L3/4 stenosis. X-ray with severe b/l knee OA. Saw [facility], notes reviewed, good relief with cervical RFA, thoracic and lumbar MBB, ILESI, no red flags noted, taking Norco 5mg qdaily prn, Lyrica 150mg BID, Tizanidine 4mg TID prn, RxAlt #2 cream. Has strong FH of substance abuse. LBP > neck pain.   Back Pain  Associated symptoms: abdominal pain    Associated symptoms: no bladder incontinence, no chest pain, no fever, no numbness and no weakness    Neck Pain   Associated symptoms include trouble swallowing. Pertinent negatives include no chest pain, fever, numbness or weakness.        The following portions of the patient's history were reviewed and updated as appropriate: allergies, current medications, past family history, past medical history, past social history, past surgical history, and problem list.    Review of Systems   Constitutional:  Positive for fatigue. Negative for chills and fever.   HENT:  Positive for hearing loss and trouble swallowing.    Eyes:  Negative for visual disturbance.   Respiratory:  Negative for shortness of breath.    Cardiovascular:  Negative for chest pain.   Gastrointestinal:  Positive for abdominal pain, constipation and diarrhea. Negative for nausea and vomiting.   Genitourinary:  Negative for urinary incontinence.   Musculoskeletal:  Positive for arthralgias, back pain and neck pain. Negative for joint swelling and myalgias.   Neurological:  Positive for headache. Negative for dizziness, weakness and numbness.       Objective   Physical  Exam  Constitutional:       Appearance: Normal appearance. She is well-developed.   HENT:      Head: Normocephalic and atraumatic.   Eyes:      Pupils: Pupils are equal, round, and reactive to light.   Cardiovascular:      Rate and Rhythm: Normal rate and regular rhythm.      Heart sounds: Normal heart sounds.   Pulmonary:      Effort: Pulmonary effort is normal.      Breath sounds: Normal breath sounds.   Abdominal:      General: Bowel sounds are normal. There is no distension.      Palpations: Abdomen is soft.      Tenderness: There is no abdominal tenderness.   Musculoskeletal:         General: Tenderness present.      Cervical back: Normal range of motion.      Comments: TTP over b/l SIJ, with (+) b/l GAMALIEL, [place], thigh thrust     Neurological:      Mental Status: She is alert and oriented to person, place, and time.      Sensory: No sensory deficit.      Deep Tendon Reflexes: Reflexes are normal and symmetric. Reflexes normal.   Psychiatric:         Mood and Affect: Mood normal.         Behavior: Behavior normal.         Thought Content: Thought content normal.         Judgment: Judgment normal.           Assessment & Plan   Problems Addressed this Visit          Other    Chronic bilateral low back pain with bilateral sciatica - Primary (Chronic)    Spinal stenosis of cervical region    Degeneration of intervertebral disc of lumbosacral region with discogenic back pain and lower extremity pain    Degenerative disc disease, cervical    Chronic pain of both knees    Restless leg syndrome    Cervicalgia    Thoracic back pain    Migraine    Spinal stenosis of thoracic region    Cervical spondylosis with myelopathy    Lumbar spondylosis with myelopathy    Spinal stenosis of lumbar region with neurogenic claudication    Pain in both feet    Sacroiliitis     Diagnoses         Codes Comments      Chronic bilateral low back pain with bilateral sciatica    -  Primary ICD-10-CM: M54.42, M54.41, G89.29  ICD-9-CM:  724.2, 724.3, 338.29       Cervicalgia     ICD-10-CM: M54.2  ICD-9-CM: 723.1       Cervical spondylosis with myelopathy     ICD-10-CM: M47.12  ICD-9-CM: 721.1       Chronic pain of both knees     ICD-10-CM: M25.561, M25.562, G89.29  ICD-9-CM: 719.46, 338.29       Degeneration of intervertebral disc of lumbosacral region with discogenic back pain and lower extremity pain     ICD-10-CM: M51.372  ICD-9-CM: 722.52       Degenerative disc disease, cervical     ICD-10-CM: M50.30  ICD-9-CM: 722.4       Lumbar spondylosis with myelopathy     ICD-10-CM: M47.16  ICD-9-CM: 721.42       Other migraine without status migrainosus, not intractable     ICD-10-CM: G43.809  ICD-9-CM: 346.80       Pain in both feet     ICD-10-CM: M79.671, M79.672  ICD-9-CM: 729.5       Restless leg syndrome     ICD-10-CM: G25.81  ICD-9-CM: 333.94       Sacroiliitis     ICD-10-CM: M46.1  ICD-9-CM: 720.2       Spinal stenosis of cervical region     ICD-10-CM: M48.02  ICD-9-CM: 723.0       Spinal stenosis of lumbar region with neurogenic claudication     ICD-10-CM: M48.062  ICD-9-CM: 724.03       Spinal stenosis of thoracic region     ICD-10-CM: M48.04  ICD-9-CM: 724.01       Chronic bilateral thoracic back pain     ICD-10-CM: M54.6, G89.29  ICD-9-CM: 724.1, 338.29             UDS in order 8/8/24  Discussed risks and benefits of opioid treatment for chonic pain with patient, including expectations related to prescription requests, alternative modalities to opioids for managing pain, her treatment plan, risks of dependency and addiction, and safe storage practices for prescribed opioids, as well as proper and improper disposal of all medications.  Treatment plan will consist of continuing current medication as long as it remains effective and is necessary, while evaluating patient at each visit and determining if the medication can be lowered or discontinued, while also using nonopioid therapies to reduce reliance on opioids.  Began Norco 5mg QID prn this  month, then qdaily prn, has FH of substance abuse. Last filled 7/10/25.  Patient's symptoms are still adequately managed by current medication regimen, is doing well at this strength and dosage, therefore I will continue to prescribe unchanged as the most appropriate course of treatment.  Cont Lyrica 150mg BID  Cont Tizanidine 4mg TID prn  Cont RxAlt #2 cream  Plan cervical RFA when we receive records from [facility] showing specific levels.  Performed 2 b/l L3-5 MBB then RFA with nearly 100% relief so far.  X-ray C-spine and T-spine for probable RFA.  Performed b/l SIJ injections with essentially 100% relief.  May need R GTB injection in future. Gets b/l knee HA injections with Ortho.   RTC in 3 months for f/u.    INSPECT REPORT     As part of the patient's treatment plan, I am prescribing controlled substances. The patient has been made aware of appropriate use of such medications, including potential risk of somnolence, limited ability to drive and/or work safely, and the potential for dependence or overdose. It has also bee made clear that these medications are for use by this patient only, without concomitant use of alcohol or other substances unless prescribed.      Patient has completed prescribing agreement detailing terms of continued prescribing of controlled substances, including monitoring INSPECT reports, urine drug screening, and pill counts if necessary. The patient is aware that inappropriate use will results in cessation of prescribing such medications.     INSPECT report has been reviewed and scanned into the patient's chart.     As the clinician, I personally reviewed the INSPECT while the patient was in the office today.     History and physical exam exhibit continued safe and appropriate use of controlled substances.

## 2025-08-08 ENCOUNTER — OFFICE VISIT (OUTPATIENT)
Dept: FAMILY MEDICINE CLINIC | Facility: CLINIC | Age: 70
End: 2025-08-08
Payer: MEDICARE

## 2025-08-08 VITALS
WEIGHT: 181.6 LBS | TEMPERATURE: 97.7 F | SYSTOLIC BLOOD PRESSURE: 106 MMHG | BODY MASS INDEX: 32.18 KG/M2 | HEART RATE: 111 BPM | DIASTOLIC BLOOD PRESSURE: 70 MMHG | RESPIRATION RATE: 18 BRPM | HEIGHT: 63 IN | OXYGEN SATURATION: 97 %

## 2025-08-08 DIAGNOSIS — I10 ESSENTIAL HYPERTENSION: ICD-10-CM

## 2025-08-08 DIAGNOSIS — Z87.891 FORMER SMOKER: ICD-10-CM

## 2025-08-08 DIAGNOSIS — B35.1 ONYCHOMYCOSIS: ICD-10-CM

## 2025-08-08 DIAGNOSIS — E11.9 TYPE 2 DIABETES MELLITUS WITHOUT COMPLICATION, WITHOUT LONG-TERM CURRENT USE OF INSULIN: Primary | ICD-10-CM

## 2025-08-08 DIAGNOSIS — F33.1 MAJOR DEPRESSIVE DISORDER, RECURRENT, MODERATE: ICD-10-CM

## 2025-08-08 DIAGNOSIS — B35.3 TINEA PEDIS OF RIGHT FOOT: ICD-10-CM

## 2025-08-08 DIAGNOSIS — B35.4 TINEA CORPORIS: ICD-10-CM

## 2025-08-08 DIAGNOSIS — E78.2 MIXED HYPERLIPIDEMIA: ICD-10-CM

## 2025-08-08 DIAGNOSIS — E66.811 CLASS 1 OBESITY WITH BODY MASS INDEX (BMI) OF 32.0 TO 32.9 IN ADULT, UNSPECIFIED OBESITY TYPE, UNSPECIFIED WHETHER SERIOUS COMORBIDITY PRESENT: ICD-10-CM

## 2025-08-08 DIAGNOSIS — I10 PRIMARY HYPERTENSION: ICD-10-CM

## 2025-08-08 LAB
EXPIRATION DATE: NORMAL
HBA1C MFR BLD: 5.1 % (ref 4.5–5.7)
Lab: NORMAL

## 2025-08-08 RX ORDER — BENAZEPRIL HYDROCHLORIDE 5 MG/1
5 TABLET ORAL DAILY
Qty: 90 TABLET | Refills: 3 | Status: SHIPPED | OUTPATIENT
Start: 2025-08-08

## 2025-08-08 RX ORDER — TIRZEPATIDE 12.5 MG/.5ML
12.5 INJECTION, SOLUTION SUBCUTANEOUS WEEKLY
Qty: 6 ML | Refills: 1 | Status: SHIPPED | OUTPATIENT
Start: 2025-08-08

## 2025-08-08 RX ORDER — TERBINAFINE HYDROCHLORIDE 250 MG/1
250 TABLET ORAL DAILY
Qty: 42 TABLET | Refills: 0 | Status: SHIPPED | OUTPATIENT
Start: 2025-08-08